# Patient Record
Sex: FEMALE | Race: WHITE | NOT HISPANIC OR LATINO | Employment: OTHER | URBAN - NONMETROPOLITAN AREA
[De-identification: names, ages, dates, MRNs, and addresses within clinical notes are randomized per-mention and may not be internally consistent; named-entity substitution may affect disease eponyms.]

---

## 2023-03-16 ENCOUNTER — TELEPHONE (OUTPATIENT)
Dept: PRIMARY CARE | Facility: CLINIC | Age: 73
End: 2023-03-16

## 2023-03-16 DIAGNOSIS — Z79.4 UNCONTROLLED TYPE 2 DIABETES MELLITUS WITH HYPERGLYCEMIA, WITH LONG-TERM CURRENT USE OF INSULIN (MULTI): Primary | ICD-10-CM

## 2023-03-16 DIAGNOSIS — E11.65 UNCONTROLLED TYPE 2 DIABETES MELLITUS WITH HYPERGLYCEMIA, WITH LONG-TERM CURRENT USE OF INSULIN (MULTI): Primary | ICD-10-CM

## 2023-03-16 RX ORDER — INSULIN DETEMIR 100 [IU]/ML
25 INJECTION, SOLUTION SUBCUTANEOUS 2 TIMES DAILY
Qty: 45 ML | Refills: 3 | Status: SHIPPED | OUTPATIENT
Start: 2023-03-16 | End: 2023-03-17 | Stop reason: ALTCHOICE

## 2023-03-16 RX ORDER — INSULIN DETEMIR 100 [IU]/ML
25 INJECTION, SOLUTION SUBCUTANEOUS 2 TIMES DAILY
COMMUNITY
End: 2023-03-16 | Stop reason: SDUPTHER

## 2023-03-16 NOTE — TELEPHONE ENCOUNTER
The pharmacy is calling to get the approval to switch the Levemir to the Flex Pen, the FlexTouch is no longer.  Please call [facility] with verbal.  When calling ask for the store, they now have an answering service screening calls.

## 2023-03-17 DIAGNOSIS — E11.21 TYPE 2 DIABETES MELLITUS WITH DIABETIC NEPHROPATHY, WITH LONG-TERM CURRENT USE OF INSULIN (MULTI): Primary | ICD-10-CM

## 2023-03-17 DIAGNOSIS — Z79.4 TYPE 2 DIABETES MELLITUS WITH DIABETIC NEPHROPATHY, WITH LONG-TERM CURRENT USE OF INSULIN (MULTI): Primary | ICD-10-CM

## 2023-03-17 RX ORDER — INSULIN DETEMIR 100 [IU]/ML
25 INJECTION, SOLUTION SUBCUTANEOUS 2 TIMES DAILY
Qty: 45 ML | Refills: 3
Start: 2023-03-17 | End: 2023-04-25 | Stop reason: SDUPTHER

## 2023-03-17 NOTE — PROGRESS NOTES
The pharmacy is calling to get the approval to switch the Levemir to the Flex Pen, the FlexTouch is no longer.  Please call [facility] with verbal.  When calling ask for the store, they now have an answering service screening calls.       Robin called pharm, verbal given for flex pen     Rx changed to flex pen in chart

## 2023-04-01 DIAGNOSIS — E11.21 TYPE 2 DIABETES MELLITUS WITH DIABETIC NEPHROPATHY, WITH LONG-TERM CURRENT USE OF INSULIN (MULTI): Primary | ICD-10-CM

## 2023-04-01 DIAGNOSIS — Z79.4 TYPE 2 DIABETES MELLITUS WITH DIABETIC NEPHROPATHY, WITH LONG-TERM CURRENT USE OF INSULIN (MULTI): Primary | ICD-10-CM

## 2023-04-01 RX ORDER — INSULIN LISPRO 200 [IU]/ML
INJECTION, SOLUTION SUBCUTANEOUS
Qty: 3 ML | Refills: 1 | Status: SHIPPED | OUTPATIENT
Start: 2023-04-01 | End: 2023-04-25 | Stop reason: SDUPTHER

## 2023-04-01 RX ORDER — INSULIN LISPRO 200 [IU]/ML
INJECTION, SOLUTION SUBCUTANEOUS
COMMUNITY
End: 2023-04-01 | Stop reason: SDUPTHER

## 2023-04-11 DIAGNOSIS — Z79.4 TYPE 2 DIABETES MELLITUS WITHOUT COMPLICATION, WITH LONG-TERM CURRENT USE OF INSULIN (MULTI): Primary | ICD-10-CM

## 2023-04-11 DIAGNOSIS — E11.9 TYPE 2 DIABETES MELLITUS WITHOUT COMPLICATION, WITH LONG-TERM CURRENT USE OF INSULIN (MULTI): Primary | ICD-10-CM

## 2023-04-13 DIAGNOSIS — J45.909 ASTHMA, UNSPECIFIED ASTHMA SEVERITY, UNSPECIFIED WHETHER COMPLICATED, UNSPECIFIED WHETHER PERSISTENT (HHS-HCC): Primary | ICD-10-CM

## 2023-04-13 RX ORDER — SALMETEROL XINAFOATE 50 UG/1
1 POWDER, METERED ORAL; RESPIRATORY (INHALATION) EVERY 12 HOURS
COMMUNITY
End: 2023-04-13 | Stop reason: SDUPTHER

## 2023-04-13 RX ORDER — SALMETEROL XINAFOATE 50 UG/1
1 POWDER, METERED ORAL; RESPIRATORY (INHALATION) EVERY 12 HOURS
Qty: 1 EACH | Refills: 1 | Status: SHIPPED | OUTPATIENT
Start: 2023-04-13 | End: 2023-06-07 | Stop reason: SDUPTHER

## 2023-04-18 ENCOUNTER — TELEMEDICINE (OUTPATIENT)
Dept: PHARMACY | Facility: HOSPITAL | Age: 73
End: 2023-04-18
Payer: MEDICARE

## 2023-04-18 DIAGNOSIS — Z79.4 TYPE 2 DIABETES MELLITUS WITHOUT COMPLICATION, WITH LONG-TERM CURRENT USE OF INSULIN (MULTI): ICD-10-CM

## 2023-04-18 DIAGNOSIS — E11.9 TYPE 2 DIABETES MELLITUS WITHOUT COMPLICATION, WITH LONG-TERM CURRENT USE OF INSULIN (MULTI): ICD-10-CM

## 2023-04-18 DIAGNOSIS — E11.9 TYPE 2 DIABETES MELLITUS WITHOUT COMPLICATION, WITH LONG-TERM CURRENT USE OF INSULIN (MULTI): Primary | ICD-10-CM

## 2023-04-18 DIAGNOSIS — Z79.4 TYPE 2 DIABETES MELLITUS WITHOUT COMPLICATION, WITH LONG-TERM CURRENT USE OF INSULIN (MULTI): Primary | ICD-10-CM

## 2023-04-18 RX ORDER — BLOOD-GLUCOSE,RECEIVER,CONT
EACH MISCELLANEOUS
Qty: 1 EACH | Refills: 0 | Status: SHIPPED | OUTPATIENT
Start: 2023-04-18 | End: 2023-06-19

## 2023-04-18 RX ORDER — MONTELUKAST SODIUM 10 MG/1
10 TABLET ORAL NIGHTLY
COMMUNITY
End: 2023-10-10 | Stop reason: SDUPTHER

## 2023-04-18 RX ORDER — BLOOD-GLUCOSE SENSOR
EACH MISCELLANEOUS
Qty: 3 EACH | Refills: 1 | Status: SHIPPED | OUTPATIENT
Start: 2023-04-18 | End: 2023-06-19 | Stop reason: WASHOUT

## 2023-04-18 RX ORDER — LEVOTHYROXINE SODIUM 112 UG/1
CAPSULE ORAL
COMMUNITY
End: 2023-11-03 | Stop reason: SDUPTHER

## 2023-04-18 RX ORDER — LOSARTAN POTASSIUM 25 MG/1
25 TABLET ORAL DAILY
COMMUNITY
End: 2023-10-10 | Stop reason: SDUPTHER

## 2023-04-18 ASSESSMENT — ENCOUNTER SYMPTOMS
TREMORS: 1
BACK PAIN: 1
DIZZINESS: 1

## 2023-04-18 NOTE — PATIENT INSTRUCTIONS
Thank you for meeting with me today. Please reach out with any questions or concerns before our appointment next week.

## 2023-04-18 NOTE — PROGRESS NOTES
Pharmacy Post-Discharge Visit  Shanika Knowles is a 72 y.o. female was referred to the [facility] pharmacy team   for their Diabetes (Type II, uncontrolled).      Referring Provider: Sangita Telles,     Subjective   Allergies   Allergen Reactions    Penicillin V Anaphylaxis    Ciprofloxacin Other     GI upset       [id number] - [place], [place] - [address] [facility]  [address]  [facility] [address]  Phone: [phone] Fax: [phone]      Social History     Social History Narrative    Not on file          Diabetes  She presents for her initial diabetic visit. She has type 2 diabetes mellitus. Her disease course has been worsening. Hypoglycemia symptoms include dizziness and tremors. Current diabetic treatment includes insulin injections. She is compliant with treatment all of the time. She is following a generally healthy diet. She participates in exercise intermittently. Her home blood glucose trend is fluctuating dramatically. An ACE inhibitor/angiotensin II receptor blocker is being taken.     Patient tests her blood glucose once daily in the morning and any time that she feels her blood sugar going low. She has had a few instances where it will get to the 50's and she gets dizzy and shaky.     -Discussed her blood glucose trends. Patient notices that about 6 hours after she eats dinner, her sugars will normalize (120's) but when she wakes up in the morning, they are higher (210's) despite not eating.     -We discussed diet and lifestyle. Patient was undergoing a lot of family stressors that led her to eating less healthy. She has since gotten back on track and noticed her sugars beginning to improve    -Patient's exercise regimen includes stretching and light yoga due to chronic back pain. We discussed how if this is the exercise she can tolerate, then she should continue with it as tolerated.     Review of Systems   Musculoskeletal:  Positive for back pain.    Neurological:  Positive for dizziness and tremors.   All other systems reviewed and are negative.      Objective     There were no vitals taken for this visit.     LAB  Lab Results   Component Value Date    BILITOT 1.3 (H) 05/02/2022    CALCIUM 10.2 06/27/2022    CO2 25 06/27/2022     06/27/2022    CREATININE 1.07 (H) 06/27/2022    GLUCOSE 174 (H) 06/27/2022    ALKPHOS 75 05/02/2022    K 4.1 06/27/2022    PROT 6.8 05/02/2022     06/27/2022    AST 25 05/02/2022    ALT 28 05/02/2022    BUN 25 (H) 06/27/2022    ANIONGAP 14 06/27/2022    ALBUMIN 4.2 05/02/2022    GFRF 55 (A) 06/27/2022     Lab Results   Component Value Date    TRIG 158 (H) 04/19/2021    CHOL 194 04/19/2021    HDL 52.2 04/19/2021     Lab Results   Component Value Date    HGBA1C 8.2 04/19/2021   IO A1c : 9.9% (3/1/2023)      Current Outpatient Medications on File Prior to Visit   Medication Sig Dispense Refill    HumaLOG KwikPen Insulin 200 unit/mL (3 mL) insulin pen pen ADMINISTER 18 UNITS UNDER THE SKIN THREE TIMES DAILY 3 mL 1    insulin detemir (Levemir FlexPen) 100 unit/mL (3 mL) pen Inject 25 Units under the skin in the morning and 25 Units before bedtime. 45 mL 3    levothyroxine (Tirosint) 112 mcg capsule Take by mouth once daily in the morning. Take before meals.      losartan (Cozaar) 25 mg tablet Take 1 tablet (25 mg) by mouth once daily.      montelukast (Singulair) 10 mg tablet Take 1 tablet (10 mg) by mouth once daily at bedtime.      salmeterol (Serevent Diskus) 50 mcg/dose diskus inhaler Inhale 1 puff  in the morning and 1 puff in the evening. 1 each 1    [DISCONTINUED] Serevent Diskus 50 mcg/dose diskus inhaler Inhale 1 puff  in the morning and 1 puff in the evening.       No current facility-administered medications on file prior to visit.        DRUG INTERATIONS  - none noted    Assessment/Plan   Problem List Items Addressed This Visit    None  Visit Diagnoses       Type 2 diabetes mellitus without complication, with  long-term current use of insulin (CMS/[facility])              START Dexcom G7 Sensors and Readers for continuous glucose monitoring.  Prescriptions sent to [facility]  CONTINUE all other medications as prescribed.   MONITOR blood glucose levels for 1 week. We will assess insulin dosing at next visit  FOLLOW UP with clinical pharmacy on 4/25 at 11 AM to discuss use of Dexcom, assess blood glucose log and determine need for insulin adjustments.     Marianna Villalta PharmD     Verbal consent to manage patient's drug therapy was obtained from the patient . They were informed they may decline to participate or withdraw from participation in pharmacy services at any time.

## 2023-04-19 NOTE — PROGRESS NOTES
I reviewed the progress note and agree with the resident’s findings and plans as written. Case discussed with resident.    Thanks,  Julisa Aceves, PharmD

## 2023-04-25 ENCOUNTER — TELEMEDICINE (OUTPATIENT)
Dept: PHARMACY | Facility: HOSPITAL | Age: 73
End: 2023-04-25
Payer: MEDICARE

## 2023-04-25 DIAGNOSIS — E11.9 TYPE 2 DIABETES MELLITUS WITHOUT COMPLICATION, WITH LONG-TERM CURRENT USE OF INSULIN (MULTI): ICD-10-CM

## 2023-04-25 DIAGNOSIS — Z79.4 TYPE 2 DIABETES MELLITUS WITHOUT COMPLICATION, WITH LONG-TERM CURRENT USE OF INSULIN (MULTI): ICD-10-CM

## 2023-04-25 DIAGNOSIS — E11.21 TYPE 2 DIABETES MELLITUS WITH DIABETIC NEPHROPATHY, WITH LONG-TERM CURRENT USE OF INSULIN (MULTI): ICD-10-CM

## 2023-04-25 DIAGNOSIS — Z79.4 TYPE 2 DIABETES MELLITUS WITH DIABETIC NEPHROPATHY, WITH LONG-TERM CURRENT USE OF INSULIN (MULTI): ICD-10-CM

## 2023-04-25 RX ORDER — INSULIN DETEMIR 100 [IU]/ML
INJECTION, SOLUTION SUBCUTANEOUS
Qty: 45 ML | Refills: 3 | Status: SHIPPED | OUTPATIENT
Start: 2023-04-25 | End: 2023-11-09 | Stop reason: SDUPTHER

## 2023-04-25 RX ORDER — LANCETS 33 GAUGE
EACH MISCELLANEOUS
Qty: 300 EACH | Refills: 3 | Status: SHIPPED | OUTPATIENT
Start: 2023-04-25 | End: 2024-03-12 | Stop reason: SDUPTHER

## 2023-04-25 RX ORDER — BLOOD SUGAR DIAGNOSTIC
STRIP MISCELLANEOUS
Qty: 300 STRIP | Refills: 3 | Status: SHIPPED | OUTPATIENT
Start: 2023-04-25 | End: 2023-11-09 | Stop reason: SDUPTHER

## 2023-04-25 RX ORDER — INSULIN LISPRO 200 [IU]/ML
INJECTION, SOLUTION SUBCUTANEOUS
Qty: 3 ML | Refills: 1 | Status: SHIPPED | OUTPATIENT
Start: 2023-04-25 | End: 2023-05-24 | Stop reason: SDUPTHER

## 2023-04-25 NOTE — PROGRESS NOTES
"Pharmacist Clinic: Diabetes Management  Shanika Knowles is a 72 y.o. female who presents for a follow-up evaluation of her Type 2 diabetes mellitus. At last visit, we discussed her blood glucose levels and potential for insulin titration.     Referring Provider: Sangita Telles, DO     LAB REVIEW   Glucose (mg/dL)   Date Value   06/27/2022 174 (H)   05/02/2022 194 (H)   04/19/2021 180 (H)     Hemoglobin A1C (%)   Date Value   04/19/2021 8.2   12/30/2020 8.2   12/02/2019 8.1     Bicarbonate (mmol/L)   Date Value   06/27/2022 25   05/02/2022 24   04/19/2021 24     Urea Nitrogen (mg/dL)   Date Value   06/27/2022 25 (H)   05/02/2022 15   04/19/2021 13     Creatinine (mg/dL)   Date Value   06/27/2022 1.07 (H)   05/02/2022 0.87   04/19/2021 0.88     Lab Results   Component Value Date    CREATININE 1.07 (H) 06/27/2022     Lab Results   Component Value Date    CHOL 194 04/19/2021    CHOL 219 (H) 12/30/2020    CHOL 204 (H) 12/02/2019     Lab Results   Component Value Date    HDL 52.2 04/19/2021    HDL 57.5 12/30/2020    HDL 52.4 12/02/2019     No results found for: LDLCALC  Lab Results   Component Value Date    TRIG 158 (H) 04/19/2021    TRIG 133 12/30/2020    TRIG 150 (H) 12/02/2019     No components found for: CHOLHDL  No results found for: LDLCALC    DIABETES ASSESSMENT    CURRENT PHARMACOTHERAPY  - Humalog 200 units/mL: 18 units TID with meals  -Levemir 100 units/mL: 26 units BID    SECONDARY PREVENTION  - Statin? No  - ACE-I/ARB? Yes  - Aspirin? No  - Last eye exam:  - Last diabetic foot exam:      Patient does not report symptoms of hypoglycemia.   Patient does not report symptoms of hyperglycemia.     -Would like to increase how often she tests as she thinks it will help us determine our next steps in treatment. I agree and we have decided to increase testing to TID while waiting for Dexcom PA to be approved.   -Uses [employer] to help bring her sugars up if needed.   -Notices that her hands are \"puffy\" and painful " "when she increases her dose much higher. Describes it as a \"rug burn\" feeling.   -Had increased her Levemir to 26 units BID on her own but is nervous to go higher as her pain gets significantly worse at the higher doses.   -Patient has a great attitude. The high levels in the morning really frustrates her though.     RECOMMENDATIONS/PLAN  1. Patients diabetes is poorly controlled with most recent A1c of 9.9% (goal < 8 %).   - Continue: Humalog 200 units/mL: 18 units TID with meals (refills needed)  - Initiate:   Levemir 100 units/mL: 25 units every morning and 27 units at bedtime  One Touch Ultra test strips: use to test blood glucose three times daily   One Touch Ultra lancets: use to test blood glucose three times daily   - Discontinue: Levemir 100 units/mL 26 units BID     2. Education:   -Patient and I discussed if she could have delayed gastric emptying that causes her sugars to dramatically spike overnight. I discussed how this is a possibility and how eating smaller, more frequent meals can help decrease that spike.       Clinical Pharmacist follow up: 5/2 @ 11 AM  -Discuss blood glucose after insulin adjustments      Thank you,  Marianna Villalta, PharmD  PGY-1 Pharmacy Resident     Verbal consent to manage patient's drug therapy was obtained from the patient. They were informed they may decline to participate or withdraw from participation in pharmacy services at any time.    "

## 2023-04-25 NOTE — PATIENT INSTRUCTIONS
It was a pleasure to meet with you today. We discussed adjusting the Levemir to 25 units subcutaneous in the morning and 27 units at bedtime to see if this helps get better control of the morning spike in glucose. Please increase testing to three times daily. I look forward to talking to you again on 5/2 @ 11AM ! Please reach out with any questions or concerns.

## 2023-05-02 ENCOUNTER — TELEMEDICINE (OUTPATIENT)
Dept: PHARMACY | Facility: HOSPITAL | Age: 73
End: 2023-05-02
Payer: MEDICARE

## 2023-05-02 DIAGNOSIS — E11.21 TYPE 2 DIABETES MELLITUS WITH DIABETIC NEPHROPATHY, WITH LONG-TERM CURRENT USE OF INSULIN (MULTI): ICD-10-CM

## 2023-05-02 DIAGNOSIS — Z79.4 TYPE 2 DIABETES MELLITUS WITH DIABETIC NEPHROPATHY, WITH LONG-TERM CURRENT USE OF INSULIN (MULTI): ICD-10-CM

## 2023-05-02 NOTE — PATIENT INSTRUCTIONS
Thank you for meeting with me today. Please continue to use your Levemir as 25 units in the morning and 27 units in the evening. Remember to also use your Humalog before meals. We will discuss next week (5/9/2023) @ 11 AM your blood glucose readings from this past week. Please reach out with any questions or concerns.

## 2023-05-02 NOTE — PROGRESS NOTES
"Pharmacist Clinic: Diabetes Management  Shanika Knowles is a 72 y.o. female who presents for a follow-up evaluation of her Type 2 diabetes mellitus. At last visit, we increased Levemir to 25 units QAM and 27 units QPM.     Referring Provider: Sangita Telles, DO     LAB REVIEW   Glucose (mg/dL)   Date Value   06/27/2022 174 (H)   05/02/2022 194 (H)   04/19/2021 180 (H)     Hemoglobin A1C (%)   Date Value   04/19/2021 8.2   12/30/2020 8.2   12/02/2019 8.1     Bicarbonate (mmol/L)   Date Value   06/27/2022 25   05/02/2022 24   04/19/2021 24     Urea Nitrogen (mg/dL)   Date Value   06/27/2022 25 (H)   05/02/2022 15   04/19/2021 13     Creatinine (mg/dL)   Date Value   06/27/2022 1.07 (H)   05/02/2022 0.87   04/19/2021 0.88     Lab Results   Component Value Date    CREATININE 1.07 (H) 06/27/2022     Lab Results   Component Value Date    CHOL 194 04/19/2021    CHOL 219 (H) 12/30/2020    CHOL 204 (H) 12/02/2019     Lab Results   Component Value Date    HDL 52.2 04/19/2021    HDL 57.5 12/30/2020    HDL 52.4 12/02/2019     No results found for: LDLCALC  Lab Results   Component Value Date    TRIG 158 (H) 04/19/2021    TRIG 133 12/30/2020    TRIG 150 (H) 12/02/2019     No components found for: CHOLHDL  No results found for: LDLCALC    DIABETES ASSESSMENT    CURRENT PHARMACOTHERAPY  - [medication, strength, directions, titration history, and initiation date]    SECONDARY PREVENTION  - Statin? No  - ACE-I/ARB? Yes  - Aspirin? No      SMBG MEASUREMENTS  - Has been eating a little less healthy this week but would like to get back on track next week and would like to talk again then.     Patient does not report symptoms of hypoglycemia.   Patient does not report symptoms of hyperglycemia.     -Patient is still having significant pain in her hands, especially since increasing the insulin dose further last week. She describes them as \"balloon-like\" today during the visit.     RECOMMENDATIONS/PLAN  1. Patients diabetes is poorly " controlled with most recent A1c of 9.9% (goal < 7 %). This was an IO A1c on 3/1/2023  - Continue: all medications as prescribed. No refills needed at this time.     2. Education:   -Should I take Humalog before or after meals? Should be taken before meals for best control of glucose        Clinical Pharmacist follow up: 5/9/2023 11 AM    Thank you,  Marianna Villalta, PharmD  PGY-1 Pharmacy Resident    Verbal consent to manage patient's drug therapy was obtained from the patient . They were informed they may decline to participate or withdraw from participation in pharmacy services at any time.

## 2023-05-09 ENCOUNTER — TELEMEDICINE (OUTPATIENT)
Dept: PHARMACY | Facility: HOSPITAL | Age: 73
End: 2023-05-09
Payer: MEDICARE

## 2023-05-09 DIAGNOSIS — Z79.4 TYPE 2 DIABETES MELLITUS WITH DIABETIC NEPHROPATHY, WITH LONG-TERM CURRENT USE OF INSULIN (MULTI): ICD-10-CM

## 2023-05-09 DIAGNOSIS — E11.21 TYPE 2 DIABETES MELLITUS WITH DIABETIC NEPHROPATHY, WITH LONG-TERM CURRENT USE OF INSULIN (MULTI): ICD-10-CM

## 2023-05-09 NOTE — PROGRESS NOTES
Pharmacist Clinic: Diabetes Management  Shanika Knowles is a 72 y.o. female who presents for a follow-up evaluation of her Type 2 diabetes mellitus. At last visit, we discussed her increased insulin dose. Patient wanted to give it one for week on this dose before making further changes.     Referring Provider: Sangita Telles, DO     LAB REVIEW   Glucose (mg/dL)   Date Value   06/27/2022 174 (H)   05/02/2022 194 (H)   04/19/2021 180 (H)     Hemoglobin A1C (%)   Date Value   04/19/2021 8.2   12/30/2020 8.2   12/02/2019 8.1     Bicarbonate (mmol/L)   Date Value   06/27/2022 25   05/02/2022 24   04/19/2021 24     Urea Nitrogen (mg/dL)   Date Value   06/27/2022 25 (H)   05/02/2022 15   04/19/2021 13     Creatinine (mg/dL)   Date Value   06/27/2022 1.07 (H)   05/02/2022 0.87   04/19/2021 0.88     Lab Results   Component Value Date    CREATININE 1.07 (H) 06/27/2022     Lab Results   Component Value Date    CHOL 194 04/19/2021    CHOL 219 (H) 12/30/2020    CHOL 204 (H) 12/02/2019     Lab Results   Component Value Date    HDL 52.2 04/19/2021    HDL 57.5 12/30/2020    HDL 52.4 12/02/2019     No results found for: LDLCALC  Lab Results   Component Value Date    TRIG 158 (H) 04/19/2021    TRIG 133 12/30/2020    TRIG 150 (H) 12/02/2019     No components found for: CHOLHDL  No results found for: LDLCALC    DIABETES ASSESSMENT    CURRENT PHARMACOTHERAPY  - Humalog 200 units/mL: 18 units TID with meals  -Levemir 100 units/mL: 25 units QAM and 27 units QPM     SECONDARY PREVENTION  - Statin? No  - ACE-I/ARB? Yes  - Aspirin? No    HISTORICAL PHARMACOTHERAPY  - Ozempic- pain in joints and muscles. Saw good results in blood glucose but not worth the discomfort.   - Jardiance- decreased kidney function on this medication (potential VICENTE)    SMBG MEASUREMENTS  -5/7  285 (7pm) --> took 18 units   110 (11pm)    -5/8  234 (8 AM, fasting)  207 (7pm) --18 unit    -5/9  137  (4AM)  197 (8AM, fasting)    Patient does not report symptoms of  hypoglycemia.   Patient does report symptoms of hyperglycemia.       RECOMMENDATIONS/PLAN  1. Patients diabetes is poorly controlled with most recent A1c of 9.9% (goal < 8 %).   - Continue: all medications as prescribed  -Patient would most likely benefit from increasing her nightly Levemir, but she is unable to tolerate a higher dose at this time.   -Patient working on diet and lifestyle adjustments and has seen some changes in her sugars.     2. Education:   -Split her evening meal and then split her evening insulin. Patient has been doing this already without any problems.     -Starting with a small goal of keeping those high morning sugars below 200 in the morning. She is disappointed in her numbers but I reminded her that a little change is better than no change.     -Inquired if she was interested in seeing endocrinology. She is not interested at this time.     -Patient declines re-starting any GLP-1 or SGLT-2 due to their side effects.       Clinical Pharmacist follow up: 6/6/2023 @11AM    Thank you,  Marianna Villalta, PharmD  PGY-1 Pharmacy Resident    Verbal consent to manage patient's drug therapy was obtained from the patient. They were informed they may decline to participate or withdraw from participation in pharmacy services at any time.

## 2023-05-24 DIAGNOSIS — Z79.4 TYPE 2 DIABETES MELLITUS WITH DIABETIC NEPHROPATHY, WITH LONG-TERM CURRENT USE OF INSULIN (MULTI): ICD-10-CM

## 2023-05-24 DIAGNOSIS — E11.21 TYPE 2 DIABETES MELLITUS WITH DIABETIC NEPHROPATHY, WITH LONG-TERM CURRENT USE OF INSULIN (MULTI): ICD-10-CM

## 2023-05-25 RX ORDER — INSULIN LISPRO 200 [IU]/ML
INJECTION, SOLUTION SUBCUTANEOUS
Qty: 9 ML | Refills: 3 | Status: SHIPPED | OUTPATIENT
Start: 2023-05-25 | End: 2023-09-14 | Stop reason: SDUPTHER

## 2023-06-06 ENCOUNTER — TELEMEDICINE (OUTPATIENT)
Dept: PHARMACY | Facility: HOSPITAL | Age: 73
End: 2023-06-06
Payer: MEDICARE

## 2023-06-06 DIAGNOSIS — E11.21 TYPE 2 DIABETES MELLITUS WITH DIABETIC NEPHROPATHY, WITH LONG-TERM CURRENT USE OF INSULIN (MULTI): ICD-10-CM

## 2023-06-06 DIAGNOSIS — Z79.4 TYPE 2 DIABETES MELLITUS WITH DIABETIC NEPHROPATHY, WITH LONG-TERM CURRENT USE OF INSULIN (MULTI): ICD-10-CM

## 2023-06-06 NOTE — PROGRESS NOTES
Pharmacist Clinic: Diabetes Management  Shanika Knowles is a 72 y.o. female who presents for a follow-up evaluation of her Type 2 diabetes mellitus. At last visit, she split insulin into night and morning doses.    Referring Provider: Sangita Telles,      LAB REVIEW   Glucose (mg/dL)   Date Value   06/27/2022 174 (H)   05/02/2022 194 (H)   04/19/2021 180 (H)     Hemoglobin A1C (%)   Date Value   04/19/2021 8.2   12/30/2020 8.2   12/02/2019 8.1     Bicarbonate (mmol/L)   Date Value   06/27/2022 25   05/02/2022 24   04/19/2021 24     Urea Nitrogen (mg/dL)   Date Value   06/27/2022 25 (H)   05/02/2022 15   04/19/2021 13     Creatinine (mg/dL)   Date Value   06/27/2022 1.07 (H)   05/02/2022 0.87   04/19/2021 0.88     Lab Results   Component Value Date    CREATININE 1.07 (H) 06/27/2022     Lab Results   Component Value Date    CHOL 194 04/19/2021    CHOL 219 (H) 12/30/2020    CHOL 204 (H) 12/02/2019     Lab Results   Component Value Date    HDL 52.2 04/19/2021    HDL 57.5 12/30/2020    HDL 52.4 12/02/2019     No results found for: LDLCALC  Lab Results   Component Value Date    TRIG 158 (H) 04/19/2021    TRIG 133 12/30/2020    TRIG 150 (H) 12/02/2019     No components found for: CHOLHDL  No results found for: LDLCALC    DIABETES ASSESSMENT    CURRENT PHARMACOTHERAPY  - Humalog 200u/mL 18u tid w meals  - Levemir 100u/mL 25u qam and 27u qpm    SECONDARY PREVENTION  - Statin? No  - ACE-I/ARB? Yes  - Aspirin? No  - Last eye exam:  - Last diabetic foot exam:    HISTORICAL PHARMACOTHERAPY  - Ozempic caused joint and muscle pain  - Jardiance caused decrease in kidney function    SMBG MEASUREMENTS    Morning Readings (Fasting):  - 6/6: 164 mg/dL  - 6/5: 164 mg/dL  - 6/4: 172 mg/dL  - 6/3: 182 mg/dL  - 6/2: 189 mg/dL  - 6/1: 164 mg/dL    Evening Readings (Around 8PM):  - 6/6: 78 mg/dL  - 5/25: 67 mg/dL  - 5/22: 130 mg/dL  - 5/20: 205 mg/dL      Patient does report symptoms of hypoglycemia.   - Patient has reported low in   60-70s and has made her feel bad. She has take a 4oz coke or 1 tablespoon of honey to increase her blood sugar when this happens. She states it happen in the evening and has only happened once in the past month.  Patient does not report symptoms of hyperglycemia.     RECOMMENDATIONS/PLAN  1. Patients diabetes is poorly controlled with most recent A1c of 9.9% (goal < 8 %).   - Continue all medications under the continuation of care with the referring provider and clinical pharmacy team  - The patient did not have many evening readings, but states she has gone low. I advised the patient to keep record and bring to next pcp appointment. If continue to be low, consider decreasing evening mealtime insulin.    2. Education/Discussion:   - At last visit, the patient was advised to eat smaller meals and more often throughout the day rather than large meals that could fluctuate blood glucose. She states that she has become very hungry when doing this and reverted back to eating 3 ~6-8 inch plate of food meals every day  - She states that prior to her most recent A1c she had dealt with a family death and had to clean out her relatives house and often ate out during this process at fast food and chinese restaurants. She believes her diet has much improved since then.  - I counseled on specific numbers for severe hypoglycemia, signs and symptoms, and how to treat/when to seek medical help  - I reviewed goals of treatment for new A1c of 8% and that it correlates to a 183mg/dL average    Clinical Pharmacist follow up: 7/11 @ 11AM to view new A1c, record new blood sugar readings, and assess any changes made at pcp appointment    Thank you,  Gerardo Hoang, PharmD  PGY1 Pharmacy Resident    Verbal consent to manage patient's drug therapy was obtained from the patient or an individual authorized to act on behalf of a patient. They were informed they may decline to participate or withdraw from participation in pharmacy services at any  time.

## 2023-06-07 ENCOUNTER — APPOINTMENT (OUTPATIENT)
Dept: PRIMARY CARE | Facility: CLINIC | Age: 73
End: 2023-06-07
Payer: MEDICARE

## 2023-06-07 DIAGNOSIS — J45.909 ASTHMA, UNSPECIFIED ASTHMA SEVERITY, UNSPECIFIED WHETHER COMPLICATED, UNSPECIFIED WHETHER PERSISTENT (HHS-HCC): ICD-10-CM

## 2023-06-07 NOTE — PROGRESS NOTES
I reviewed the progress note and agree with the resident’s findings and plans as written. Case discussed with resident. If A1c is not at goal, recommend endocrinology referral.     Thanks,  Julisa Aecves, PharmD

## 2023-06-08 RX ORDER — SALMETEROL XINAFOATE 50 UG/1
1 POWDER, METERED ORAL; RESPIRATORY (INHALATION) EVERY 12 HOURS
Qty: 1 EACH | Refills: 1 | Status: SHIPPED | OUTPATIENT
Start: 2023-06-08 | End: 2023-09-14 | Stop reason: SDUPTHER

## 2023-06-15 DIAGNOSIS — E11.65 UNCONTROLLED TYPE 2 DIABETES MELLITUS WITH HYPERGLYCEMIA (MULTI): Primary | ICD-10-CM

## 2023-06-15 LAB — THYROTROPIN (MIU/L) IN SER/PLAS BY DETECTION LIMIT <= 0.05 MIU/L: 0.69 MIU/L (ref 0.44–3.98)

## 2023-06-15 RX ORDER — PEN NEEDLE, DIABETIC 30 GX3/16"
1 NEEDLE, DISPOSABLE MISCELLANEOUS
Qty: 500 EACH | Refills: 1 | Status: SHIPPED | OUTPATIENT
Start: 2023-06-15 | End: 2023-11-09 | Stop reason: SDUPTHER

## 2023-06-15 RX ORDER — PEN NEEDLE, DIABETIC 30 GX3/16"
1 NEEDLE, DISPOSABLE MISCELLANEOUS
COMMUNITY
End: 2023-06-15 | Stop reason: SDUPTHER

## 2023-06-19 ENCOUNTER — OFFICE VISIT (OUTPATIENT)
Dept: PRIMARY CARE | Facility: CLINIC | Age: 73
End: 2023-06-19
Payer: MEDICARE

## 2023-06-19 VITALS
BODY MASS INDEX: 35.73 KG/M2 | OXYGEN SATURATION: 94 % | HEART RATE: 82 BPM | SYSTOLIC BLOOD PRESSURE: 134 MMHG | DIASTOLIC BLOOD PRESSURE: 78 MMHG | TEMPERATURE: 98.4 F | WEIGHT: 221.4 LBS | RESPIRATION RATE: 14 BRPM

## 2023-06-19 DIAGNOSIS — I10 ESSENTIAL HYPERTENSION: ICD-10-CM

## 2023-06-19 DIAGNOSIS — Z79.4 TYPE 2 DIABETES MELLITUS WITH OTHER SPECIFIED COMPLICATION, WITH LONG-TERM CURRENT USE OF INSULIN (MULTI): Primary | ICD-10-CM

## 2023-06-19 DIAGNOSIS — E66.9 CLASS 2 OBESITY WITH BODY MASS INDEX (BMI) OF 35.0 TO 35.9 IN ADULT, UNSPECIFIED OBESITY TYPE, UNSPECIFIED WHETHER SERIOUS COMORBIDITY PRESENT: ICD-10-CM

## 2023-06-19 DIAGNOSIS — E78.5 HYPERLIPIDEMIA, UNSPECIFIED HYPERLIPIDEMIA TYPE: ICD-10-CM

## 2023-06-19 DIAGNOSIS — E11.69 TYPE 2 DIABETES MELLITUS WITH OTHER SPECIFIED COMPLICATION, WITH LONG-TERM CURRENT USE OF INSULIN (MULTI): Primary | ICD-10-CM

## 2023-06-19 DIAGNOSIS — E03.9 ACQUIRED HYPOTHYROIDISM: ICD-10-CM

## 2023-06-19 PROBLEM — I25.10 CORONARY ARTERY DISEASE: Status: ACTIVE | Noted: 2023-06-19

## 2023-06-19 PROBLEM — Z87.448 HISTORY OF DECREASED RENAL FUNCTION: Status: ACTIVE | Noted: 2023-06-19

## 2023-06-19 PROBLEM — E11.9 TYPE 2 DIABETES MELLITUS, WITH LONG-TERM CURRENT USE OF INSULIN (MULTI): Status: ACTIVE | Noted: 2023-06-19

## 2023-06-19 PROBLEM — N18.30 CKD (CHRONIC KIDNEY DISEASE) STAGE 3, GFR 30-59 ML/MIN (MULTI): Status: ACTIVE | Noted: 2023-06-19

## 2023-06-19 PROBLEM — J45.909 ASTHMA (HHS-HCC): Status: ACTIVE | Noted: 2023-06-19

## 2023-06-19 PROBLEM — H90.3 BILATERAL SENSORINEURAL HEARING LOSS: Status: ACTIVE | Noted: 2023-06-19

## 2023-06-19 PROBLEM — M85.80 OSTEOPENIA: Status: ACTIVE | Noted: 2023-06-19

## 2023-06-19 PROBLEM — E66.812 CLASS 2 OBESITY WITH BODY MASS INDEX (BMI) OF 35.0 TO 35.9 IN ADULT: Status: ACTIVE | Noted: 2023-06-19

## 2023-06-19 LAB — POC HEMOGLOBIN A1C: 8.8 % (ref 4.2–6.5)

## 2023-06-19 PROCEDURE — 3066F NEPHROPATHY DOC TX: CPT | Performed by: FAMILY MEDICINE

## 2023-06-19 PROCEDURE — 3075F SYST BP GE 130 - 139MM HG: CPT | Performed by: FAMILY MEDICINE

## 2023-06-19 PROCEDURE — 83036 HEMOGLOBIN GLYCOSYLATED A1C: CPT | Performed by: FAMILY MEDICINE

## 2023-06-19 PROCEDURE — 3078F DIAST BP <80 MM HG: CPT | Performed by: FAMILY MEDICINE

## 2023-06-19 PROCEDURE — 1159F MED LIST DOCD IN RCRD: CPT | Performed by: FAMILY MEDICINE

## 2023-06-19 PROCEDURE — 1036F TOBACCO NON-USER: CPT | Performed by: FAMILY MEDICINE

## 2023-06-19 PROCEDURE — 3008F BODY MASS INDEX DOCD: CPT | Performed by: FAMILY MEDICINE

## 2023-06-19 PROCEDURE — 99214 OFFICE O/P EST MOD 30 MIN: CPT | Performed by: FAMILY MEDICINE

## 2023-06-19 PROCEDURE — 4010F ACE/ARB THERAPY RXD/TAKEN: CPT | Performed by: FAMILY MEDICINE

## 2023-06-19 PROCEDURE — 1160F RVW MEDS BY RX/DR IN RCRD: CPT | Performed by: FAMILY MEDICINE

## 2023-06-19 RX ORDER — NAPROXEN SODIUM 220 MG/1
TABLET, FILM COATED ORAL EVERY 24 HOURS
COMMUNITY

## 2023-06-19 NOTE — ASSESSMENT & PLAN NOTE
Lifestyle managed, does take baby aspirin every other day.  Patient previously disinclined to start statin therapy due to possible side effects  5/2021 CACS was 205.36.  1/2022 TC/HDL ratio 4.2 ()  Goal TC/HDL ratio 3.4 or less, LDL 99 or less,  or less, and TRIG 150 or less.  Repeat lipid panel ordered to be done prior to next routine follow-up.

## 2023-06-19 NOTE — PROGRESS NOTES
Subjective   Patient ID: Shanika Knowles is a 72 y.o. female who presents for Follow-up (F/up DM- pt has no concerns at this time ).    HPI     Last seen 3/2023, here for 3 month follow-up. Has been following with [facility] pharmacy for DM in the interim.    She is exercising and stretching daily.  She tries walking x 2 days  Limited secondary to hip pain, cannot bike with this.  She has been gardening.    Since beginning of June she has been eating out more.  Had a lot of family in town visiting.    CHRONIC CONDITIONS:  -Hypothyroidism  CURRENT DOSE: Synthroid 112 mcg daily.   6/2023 TSH was normal.     Medication is being taken as directed and is well-tolerated.   Patient denies heat or cold intolerance, diarrhea or constipation, coarsening of hair, and palpitations.      -HTN  Taking Losartan 25 mg daily.  Medications are being taken and tolerated well.   No side effects are reported.  Patient is taking blood pressure outside of office and reports good control.  Patient denies chest pain, shortness of breath with exertion, palpitations, lower extremity edema, headache, or dizziness.      -DM  Co-managed with [facility] pharmacy  Presently on Humalog + Levemir.  Unable to tolerate Jardiance due to side effects and decrease in kidney function.  Unable to tolerate Ozempic due to joint and muscle pain.    Pharmacy advised keep glucose below 200 in AM  She has had 2-3 readings below 60/70 x 2-3 times per month.  She cannot attribute these episode to missed meals.  Pharmacy suspects component of delayed gastric emptying.  She denies any overeating.    6/2023 Hgb A1c 8.8, prior A1c 9.9 in 3/2023 and 7.9 in 9/2022, 8.7 in 5/2022, 8.1 in 1/2021, 8.6 in 9/2021 5/2022 urine albumin negative.  9/2022 food check completed.     The patient denies polyuria, polydipsia, or polyphagia.   The patient denies numbness and tingling in their hands or feet.     -HLD/CAD  Lifestyle managed, does take baby aspirin every other day.  Patient previously  disinclined to start statin therapy due to possible side effects  5/2021 CACS was 205.36.  1/2022 TC/HDL ratio 4.2 ()     Patient denies any facial droop, sudden vision loss, weakness or numbness on one side of body.   Patient denies chest pain, shortness of breath with exertion, palpitations, or symptoms of claudication.      -History of stage III CKD, baseline of GFR 55-57.  She is s/p nephrology evaluation with Dr. Ferrari, was told everything is normal.  GFR 65 at that time, was told does not have CKD III  Told to come back as needed.     6/2022 GFR 55 + creatinine 1.07  5/2022 GFR 70 + creatinine 0.87.     -History of vitamin D deficiency.  4/2021 vitamin D level 24 (insufficient but not quite considered deficient).         Review of Systems   All other systems reviewed and are negative.      Objective   /78 (BP Location: Left arm, Patient Position: Sitting, BP Cuff Size: Large adult)   Pulse 82   Temp 36.9 °C (98.4 °F) (Temporal)   Resp 14   Wt 100 kg (221 lb 6.4 oz)   SpO2 94%   BMI 35.73 kg/m²     Physical Exam  Vitals and nursing note reviewed.   Constitutional:       General: She is not in acute distress.     Appearance: Normal appearance. She is not toxic-appearing.   HENT:      Head: Normocephalic and atraumatic.   Neck:      Thyroid: No thyromegaly.   Cardiovascular:      Rate and Rhythm: Normal rate and regular rhythm.      Heart sounds: No murmur heard.     No friction rub. No gallop.   Pulmonary:      Effort: Pulmonary effort is normal.      Breath sounds: Normal breath sounds. No wheezing, rhonchi or rales.   Musculoskeletal:      Right lower leg: No edema.      Left lower leg: No edema.   Lymphadenopathy:      Cervical: No cervical adenopathy.   Skin:     General: Skin is warm and dry.   Neurological:      General: No focal deficit present.      Mental Status: She is alert and oriented to person, place, and time.   Psychiatric:         Mood and Affect: Mood normal.          Behavior: Behavior normal.         Assessment/Plan   Problem List Items Addressed This Visit       Acquired hypothyroidism     CURRENT DOSE: Synthroid 112 mcg daily.   6/2023 TSH was normal, to be checked annually.         Essential hypertension     BP is 134/78 in office today, mildly elevated  Goal BP is 130/80 or less, ideally 120/80 or less.   Patient reports pressures in 120s outside office.  Continue Losartan 25 mg daily.  Continue with home monitoring.  Diet and exercise recommendations revisited.         Relevant Orders    Follow Up In [facility] - PCP    Comprehensive Metabolic Panel    CBC    Lipid Panel    Hyperlipidemia     Lifestyle managed, does take baby aspirin every other day.  Patient previously disinclined to start statin therapy due to possible side effects  5/2021 CACS was 205.36.  1/2022 TC/HDL ratio 4.2 ()  Goal TC/HDL ratio 3.4 or less, LDL 99 or less,  or less, and TRIG 150 or less.  Repeat lipid panel ordered to be done prior to next routine follow-up.         Relevant Orders    Follow Up In [facility] - PCP    Lipid Panel    Class 2 obesity with body mass index (BMI) of 35.0 to 35.9 in adult    Type 2 diabetes mellitus, with long-term current use of insulin (CMS/[facility]) - Primary     6/2023 Hgb A1c 8.8, prior A1c 9.9 in 3/2023 and 7.9 in 9/2022.  5/2022 urine albumin negative, due annually, ordered today.  9/2022 food check completed, to be done annually.    Repeat A1c and urine albumin ordered to be done prior to 3 month follow-up.    Goal A1c is 7.0 or less  A1c is trending in the right direction  Diet and exercise recommendations revisited.    Continue present regimen:  - Humalog 18u tid w meals  - Levemir 25u qam and 27u qpm  - Unable to tolerate both Jardiance and Ozempic in the past.    Currently co-managed with [facility] pharmacy, patient to continue to follow-up with them as schedule regarding insulin titration. We briefly discussed doing trial with GLP-1  injectables other than Ozempic, may further discuss options available with pharmacy. Patient disinclined to pursue if cost prohibitive.         Relevant Orders    Follow Up In [facility] - PCP    Hemoglobin A1C    Albumin , Urine Random    Comprehensive Metabolic Panel    CBC       Follow-up in 3 months for recheck DM.  Due to diabetic foot exam upon rooming.  Labs to be done prior.   Call for sooner follow-up if needed.          Scribe Attestation  By signing my name below, IAvril Scribe   attest that this documentation has been prepared under the direction and in the presence of Sangita Telles DO.

## 2023-06-19 NOTE — ASSESSMENT & PLAN NOTE
6/2023 Hgb A1c 8.8, prior A1c 9.9 in 3/2023 and 7.9 in 9/2022.  5/2022 urine albumin negative, due annually, ordered today.  9/2022 food check completed, to be done annually.    Repeat A1c and urine albumin ordered to be done prior to 3 month follow-up.    Goal A1c is 7.0 or less  A1c is trending in the right direction  Diet and exercise recommendations revisited.    Continue present regimen:  - Humalog 18u tid w meals  - Levemir 25u qam and 27u qpm  - Unable to tolerate both Jardiance and Ozempic in the past.    Currently co-managed with [facility] pharmacy, patient to continue to follow-up with them as schedule regarding insulin titration. We briefly discussed doing trial with GLP-1 injectables other than Ozempic, may further discuss options available with pharmacy. Patient disinclined to pursue if cost prohibitive.

## 2023-06-19 NOTE — ASSESSMENT & PLAN NOTE
BP is 134/78 in office today, mildly elevated  Goal BP is 130/80 or less, ideally 120/80 or less.   Patient reports pressures in 120s outside office.  Continue Losartan 25 mg daily.  Continue with home monitoring.  Diet and exercise recommendations revisited.

## 2023-09-14 DIAGNOSIS — Z79.4 TYPE 2 DIABETES MELLITUS WITH DIABETIC NEPHROPATHY, WITH LONG-TERM CURRENT USE OF INSULIN (MULTI): ICD-10-CM

## 2023-09-14 DIAGNOSIS — E11.21 TYPE 2 DIABETES MELLITUS WITH DIABETIC NEPHROPATHY, WITH LONG-TERM CURRENT USE OF INSULIN (MULTI): ICD-10-CM

## 2023-09-14 DIAGNOSIS — J45.909 ASTHMA, UNSPECIFIED ASTHMA SEVERITY, UNSPECIFIED WHETHER COMPLICATED, UNSPECIFIED WHETHER PERSISTENT (HHS-HCC): ICD-10-CM

## 2023-09-14 RX ORDER — INSULIN LISPRO 200 [IU]/ML
INJECTION, SOLUTION SUBCUTANEOUS
Qty: 9 ML | Refills: 3 | Status: SHIPPED | OUTPATIENT
Start: 2023-09-14 | End: 2023-11-09 | Stop reason: SDUPTHER

## 2023-09-14 RX ORDER — SALMETEROL XINAFOATE 50 UG/1
1 POWDER, METERED ORAL; RESPIRATORY (INHALATION) EVERY 12 HOURS
Qty: 1 EACH | Refills: 1 | Status: SHIPPED | OUTPATIENT
Start: 2023-09-14 | End: 2023-11-27 | Stop reason: SDUPTHER

## 2023-09-25 ENCOUNTER — LAB (OUTPATIENT)
Dept: LAB | Facility: LAB | Age: 73
End: 2023-09-25
Payer: MEDICARE

## 2023-09-25 DIAGNOSIS — E78.5 HYPERLIPIDEMIA, UNSPECIFIED HYPERLIPIDEMIA TYPE: ICD-10-CM

## 2023-09-25 DIAGNOSIS — E11.69 TYPE 2 DIABETES MELLITUS WITH OTHER SPECIFIED COMPLICATION, WITH LONG-TERM CURRENT USE OF INSULIN (MULTI): ICD-10-CM

## 2023-09-25 DIAGNOSIS — Z79.4 TYPE 2 DIABETES MELLITUS WITH OTHER SPECIFIED COMPLICATION, WITH LONG-TERM CURRENT USE OF INSULIN (MULTI): ICD-10-CM

## 2023-09-25 DIAGNOSIS — I10 ESSENTIAL HYPERTENSION: ICD-10-CM

## 2023-09-25 LAB
ALANINE AMINOTRANSFERASE (SGPT) (U/L) IN SER/PLAS: 27 U/L (ref 7–45)
ALBUMIN (G/DL) IN SER/PLAS: 4.2 G/DL (ref 3.4–5)
ALBUMIN (MG/L) IN URINE: 11.8 MG/L
ALBUMIN/CREATININE (UG/MG) IN URINE: 7.8 UG/MG CRT (ref 0–30)
ALKALINE PHOSPHATASE (U/L) IN SER/PLAS: 74 U/L (ref 33–136)
ANION GAP IN SER/PLAS: 16 MMOL/L (ref 10–20)
ASPARTATE AMINOTRANSFERASE (SGOT) (U/L) IN SER/PLAS: 33 U/L (ref 9–39)
BILIRUBIN TOTAL (MG/DL) IN SER/PLAS: 1.6 MG/DL (ref 0–1.2)
CALCIUM (MG/DL) IN SER/PLAS: 9.7 MG/DL (ref 8.6–10.6)
CARBON DIOXIDE, TOTAL (MMOL/L) IN SER/PLAS: 23 MMOL/L (ref 21–32)
CHLORIDE (MMOL/L) IN SER/PLAS: 104 MMOL/L (ref 98–107)
CHOLESTEROL (MG/DL) IN SER/PLAS: 212 MG/DL (ref 0–199)
CHOLESTEROL IN HDL (MG/DL) IN SER/PLAS: 57.9 MG/DL
CHOLESTEROL/HDL RATIO: 3.7
CREATININE (MG/DL) IN SER/PLAS: 1.06 MG/DL (ref 0.5–1.05)
CREATININE (MG/DL) IN URINE: 151 MG/DL (ref 20–320)
ERYTHROCYTE DISTRIBUTION WIDTH (RATIO) BY AUTOMATED COUNT: 12.9 % (ref 11.5–14.5)
ERYTHROCYTE MEAN CORPUSCULAR HEMOGLOBIN CONCENTRATION (G/DL) BY AUTOMATED: 31.6 G/DL (ref 32–36)
ERYTHROCYTE MEAN CORPUSCULAR VOLUME (FL) BY AUTOMATED COUNT: 96 FL (ref 80–100)
ERYTHROCYTES (10*6/UL) IN BLOOD BY AUTOMATED COUNT: 4.74 X10E12/L (ref 4–5.2)
ESTIMATED AVERAGE GLUCOSE FOR HBA1C: 212 MG/DL
GFR FEMALE: 55 ML/MIN/1.73M2
GLUCOSE (MG/DL) IN SER/PLAS: 232 MG/DL (ref 74–99)
HEMATOCRIT (%) IN BLOOD BY AUTOMATED COUNT: 45.3 % (ref 36–46)
HEMOGLOBIN (G/DL) IN BLOOD: 14.3 G/DL (ref 12–16)
HEMOGLOBIN A1C/HEMOGLOBIN TOTAL IN BLOOD: 9 %
LDL: 124 MG/DL (ref 0–99)
LEUKOCYTES (10*3/UL) IN BLOOD BY AUTOMATED COUNT: 7.3 X10E9/L (ref 4.4–11.3)
NRBC (PER 100 WBCS) BY AUTOMATED COUNT: 0 /100 WBC (ref 0–0)
PLATELETS (10*3/UL) IN BLOOD AUTOMATED COUNT: 200 X10E9/L (ref 150–450)
POTASSIUM (MMOL/L) IN SER/PLAS: 4.2 MMOL/L (ref 3.5–5.3)
PROTEIN TOTAL: 7.2 G/DL (ref 6.4–8.2)
SODIUM (MMOL/L) IN SER/PLAS: 139 MMOL/L (ref 136–145)
TRIGLYCERIDE (MG/DL) IN SER/PLAS: 153 MG/DL (ref 0–149)
UREA NITROGEN (MG/DL) IN SER/PLAS: 17 MG/DL (ref 6–23)
VLDL: 31 MG/DL (ref 0–40)

## 2023-09-25 PROCEDURE — 82570 ASSAY OF URINE CREATININE: CPT

## 2023-09-25 PROCEDURE — 36415 COLL VENOUS BLD VENIPUNCTURE: CPT

## 2023-09-25 PROCEDURE — 85027 COMPLETE CBC AUTOMATED: CPT

## 2023-09-25 PROCEDURE — 80053 COMPREHEN METABOLIC PANEL: CPT

## 2023-09-25 PROCEDURE — 83036 HEMOGLOBIN GLYCOSYLATED A1C: CPT

## 2023-09-25 PROCEDURE — 82043 UR ALBUMIN QUANTITATIVE: CPT

## 2023-09-25 PROCEDURE — 80061 LIPID PANEL: CPT

## 2023-09-27 NOTE — PROGRESS NOTES
Subjective   Patient ID: Shanika Knowles is a 73 y.o. female who presents for Follow-up (Pt presents for 3 month follow up HTN, DM, HLD- pt states no concerns at this time. ).    HPI     Patient presents today for 3 month follow-up of DM.    Requesting albuterol nebulizer to [place].     --DM  Co-managed with [facility] pharmacy     Per 6/2023 OV with PCP, A1c trending up, currently co-managed with [facility] pharmacy, patient to continue to follow-up with them as schedule regarding insulin titration. We briefly discussed doing trial with GLP-1 injectables other than Ozempic, may further discuss options available with pharmacy. Patient disinclined to pursue if cost prohibitive.    Last [facility] pharm note 6/9/2023    Patient admits there is room for improvement in her dietary measures.  Notices lower sugars when she restricts sugar/sweets.    Had glucose in 70s in the evening, had coke and dinner.  She does not take meal time insulin if gets low reading before meals.  States this happens most nights, at least 2-3 times per week.  States she does not eat snacks during the day.  Fasting glucoses ranging in 170s-200s    Hgb A1c: 9.0 in 9/2023, 8.8 in 6/2023, 9.9 in 3/2023, 7.9 in 9/2022  Albumin: negative 9/2023  Foot exam: 9/2023 - also sees podiatry  Eye exam: 11/2022 - patient reports done in June 2023    Current regimen:  Humalog 12-18u tid w meals  Levemir 50u AM only    Prior medications:   Metformin , unable to tolerate 2/2 hives and migraines.  Pioglitazone, unable to tolerate 2/2 hives and migraines.  Januvia, , unable to tolerate 2/2 migraines.  Jardiance, unable to tolerate 2/2 side effects & decrease in kidney fxn.  Ozempic, unable to tolerate 2/2 due to joint and muscle pain.      OTHER CHRONIC CONDITIONS:  -HTN  Taking Losartan 25 mg daily.    -HLD/CAD  Lifestyle managed, does take baby aspirin every other day.  Patient previously disinclined to start statin therapy due to possible side  effects.  5/2021 CACS was 205.36.  1/2022 TC/HDL ratio 4.2 ()  9/2023 TC/HDL ratio 3.7 ().    -HYPOTHYROIDISM  CURRENT DOSE: Synthroid 112 mcg daily.   6/2023 TSH was normal, to be checked annually.    Review of Systems   All other systems reviewed and are negative.      Objective   /68 (BP Location: Right arm, Patient Position: Sitting, BP Cuff Size: Large adult)   Pulse 68   Temp 36.7 °C (98 °F) (Temporal)   Resp 16   Wt 100 kg (220 lb 14.4 oz)   SpO2 96%   BMI 35.65 kg/m²     Physical Exam  Vitals and nursing note reviewed.   Constitutional:       General: She is not in acute distress.     Appearance: Normal appearance. She is not toxic-appearing.   HENT:      Head: Normocephalic and atraumatic.   Cardiovascular:      Rate and Rhythm: Normal rate and regular rhythm.      Heart sounds: No murmur heard.     No friction rub. No gallop.   Pulmonary:      Effort: Pulmonary effort is normal.      Breath sounds: Normal breath sounds. No wheezing, rhonchi or rales.   Musculoskeletal:      Right lower leg: No edema.      Left lower leg: No edema.   Feet:      Comments: Diabetic Foot Exam:  Right Foot Findings: Normal visual exam. Toes were normal. No skin breakdown.  Left Foot Findings: Normal visual exam. Toes were normal. No skin breakdown.  Monofilament Testing: Normal tactile sensation with monofilament testing throughout both feet.    R FOOT: Normal alignment and arch. Skin, nails, color, turgor are normal. No pain to palpation. Sensation intact. Dorsalis pedis pulses normal. Full range of motion of all joints. Gait normal.    L FOOT: Normal alignment and arch. Skin, nails, color, turgor are normal. No pain to palpation. Sensation intact. Dorsalis pedis pulses normal. Full range of motion of all joints. Gait normal.   Neurological:      General: No focal deficit present.      Mental Status: She is alert and oriented to person, place, and time.   Psychiatric:         Mood and Affect: Mood  normal.         Behavior: Behavior normal.         Assessment/Plan   Problem List Items Addressed This Visit             ICD-10-CM    Asthma J45.909     Albuterol nebulizer refilled at patient request for her to have incase needed while traveling.         Relevant Medications    albuterol 2.5 mg /3 mL (0.083 %) nebulizer solution    Essential hypertension I10     BP is 119/68 in office today, good control.  Goal BP is 130/80 or less, ideally 120/80 or less.   Continue Losartan 25 mg daily.  Continue with home monitoring.         Hyperlipidemia E78.5     Lifestyle managed, does take baby aspirin every other day.  Patient previously disinclined to start statin therapy due to possible side effects  5/2021 CACS was 205.36.  1/2022 TC/HDL ratio 4.2 ()  9/2023 TC/HDL ratio 3.7 ().  Cholesterol improved from prior - PATIENT COMMENDED FOR THIS PROGRESS :)  Goal TC/HDL ratio 3.4 or less, LDL 99 or less,  or less, and TRIG 150 or less.  Diet and exercise recommendations revisited.         Class 2 obesity with body mass index (BMI) of 35.0 to 35.9 in adult E66.9, Z68.35    Type 2 diabetes mellitus, with long-term current use of insulin (CMS/[facility]) - Primary E11.9, Z79.4     Hgb A1c: 9.0 in 9/2023, 8.8 in 6/2023, 9.9 in 3/2023, 7.9 in 9/2022  Albumin: negative 9/2023    Current regimen:  Humalog 12-18u tid w meals  Levemir 50u AM only - states unable to sleep if takes this at night.    Co-managed with [facility] pharmacy, since patient reports some hypoglycemia however her A1c remains elevated at 9.0 I will refer her back to the pharmacist to help with insulin dosing/timing and to see if elevated for continuous glucose monitor (states was not covered by insurance in the past).    Consider trying glucose tablets rather than the coke.  We discussed titrating insulin may require patient to have small frequent snacks rather than large meals - to further discuss with pharmacy.    Patient having to testing sugars often for  insulin titration, A1c remains sub-optimally controlled and patient has tried and failed numerous other diabetic medication in past. I do feel she would benefit from continuous glucose monitoring system such as DEXCOM, patient to further discuss with pharmacy as she states was not covered by insurance in past.         Relevant Orders    Follow Up In [facility] - Pharmacy     Other Visit Diagnoses         Codes    Other screening mammogram     Z12.31            Follow-up in 3 months for recheck DM + MWV.  IO A1c upon rooming.  Call for sooner follow-up if needed.     Scribe Attestation  By signing my name below, I, Vladimir Prieto   attest that this documentation has been prepared under the direction and in the presence of Sangita Telles DO.

## 2023-09-28 ENCOUNTER — OFFICE VISIT (OUTPATIENT)
Dept: PRIMARY CARE | Facility: CLINIC | Age: 73
End: 2023-09-28
Payer: MEDICARE

## 2023-09-28 VITALS
BODY MASS INDEX: 35.65 KG/M2 | TEMPERATURE: 98 F | WEIGHT: 220.9 LBS | SYSTOLIC BLOOD PRESSURE: 119 MMHG | DIASTOLIC BLOOD PRESSURE: 68 MMHG | HEART RATE: 68 BPM | RESPIRATION RATE: 16 BRPM | OXYGEN SATURATION: 96 %

## 2023-09-28 DIAGNOSIS — J45.909 ASTHMA, UNSPECIFIED ASTHMA SEVERITY, UNSPECIFIED WHETHER COMPLICATED, UNSPECIFIED WHETHER PERSISTENT (HHS-HCC): ICD-10-CM

## 2023-09-28 DIAGNOSIS — Z79.4 TYPE 2 DIABETES MELLITUS WITH OTHER SPECIFIED COMPLICATION, WITH LONG-TERM CURRENT USE OF INSULIN (MULTI): Primary | ICD-10-CM

## 2023-09-28 DIAGNOSIS — E78.5 HYPERLIPIDEMIA, UNSPECIFIED HYPERLIPIDEMIA TYPE: ICD-10-CM

## 2023-09-28 DIAGNOSIS — E66.9 CLASS 2 OBESITY WITH BODY MASS INDEX (BMI) OF 35.0 TO 35.9 IN ADULT, UNSPECIFIED OBESITY TYPE, UNSPECIFIED WHETHER SERIOUS COMORBIDITY PRESENT: ICD-10-CM

## 2023-09-28 DIAGNOSIS — E11.69 TYPE 2 DIABETES MELLITUS WITH OTHER SPECIFIED COMPLICATION, WITH LONG-TERM CURRENT USE OF INSULIN (MULTI): Primary | ICD-10-CM

## 2023-09-28 DIAGNOSIS — I10 ESSENTIAL HYPERTENSION: ICD-10-CM

## 2023-09-28 DIAGNOSIS — Z12.31 OTHER SCREENING MAMMOGRAM: ICD-10-CM

## 2023-09-28 PROCEDURE — 1036F TOBACCO NON-USER: CPT | Performed by: FAMILY MEDICINE

## 2023-09-28 PROCEDURE — 3052F HG A1C>EQUAL 8.0%<EQUAL 9.0%: CPT | Performed by: FAMILY MEDICINE

## 2023-09-28 PROCEDURE — 3008F BODY MASS INDEX DOCD: CPT | Performed by: FAMILY MEDICINE

## 2023-09-28 PROCEDURE — 99214 OFFICE O/P EST MOD 30 MIN: CPT | Performed by: FAMILY MEDICINE

## 2023-09-28 PROCEDURE — 3074F SYST BP LT 130 MM HG: CPT | Performed by: FAMILY MEDICINE

## 2023-09-28 PROCEDURE — 3078F DIAST BP <80 MM HG: CPT | Performed by: FAMILY MEDICINE

## 2023-09-28 PROCEDURE — 1159F MED LIST DOCD IN RCRD: CPT | Performed by: FAMILY MEDICINE

## 2023-09-28 PROCEDURE — 3066F NEPHROPATHY DOC TX: CPT | Performed by: FAMILY MEDICINE

## 2023-09-28 PROCEDURE — 1160F RVW MEDS BY RX/DR IN RCRD: CPT | Performed by: FAMILY MEDICINE

## 2023-09-28 PROCEDURE — 4010F ACE/ARB THERAPY RXD/TAKEN: CPT | Performed by: FAMILY MEDICINE

## 2023-09-28 RX ORDER — ALBUTEROL SULFATE 0.83 MG/ML
2.5 SOLUTION RESPIRATORY (INHALATION)
COMMUNITY
End: 2023-09-28 | Stop reason: SDUPTHER

## 2023-09-28 RX ORDER — ALBUTEROL SULFATE 90 UG/1
1-2 AEROSOL, METERED RESPIRATORY (INHALATION) EVERY 4 HOURS PRN
COMMUNITY

## 2023-09-28 RX ORDER — ALBUTEROL SULFATE 0.83 MG/ML
2.5 SOLUTION RESPIRATORY (INHALATION) EVERY 6 HOURS PRN
Qty: 75 ML | Refills: 0 | Status: SHIPPED | OUTPATIENT
Start: 2023-09-28 | End: 2023-10-16 | Stop reason: SDUPTHER

## 2023-09-28 NOTE — ASSESSMENT & PLAN NOTE
Hgb A1c: 9.0 in 9/2023, 8.8 in 6/2023, 9.9 in 3/2023, 7.9 in 9/2022  Albumin: negative 9/2023    Current regimen:  Humalog 12-18u tid w meals  Levemir 50u AM only - states unable to sleep if takes this at night.    Co-managed with [facility] pharmacy, since patient reports some hypoglycemia however her A1c remains elevated at 9.0 I will refer her back to the pharmacist to help with insulin dosing/timing and to see if elevated for continuous glucose monitor (states was not covered by insurance in the past).    Consider trying glucose tablets rather than the coke.  We discussed titrating insulin may require patient to have small frequent snacks rather than large meals - to further discuss with pharmacy.    Patient having to testing sugars often for insulin titration, A1c remains sub-optimally controlled and patient has tried and failed numerous other diabetic medication in past. I do feel she would benefit from continuous glucose monitoring system such as DEXCOM, patient to further discuss with pharmacy as she states was not covered by insurance in past.

## 2023-09-28 NOTE — ASSESSMENT & PLAN NOTE
Lifestyle managed, does take baby aspirin every other day.  Patient previously disinclined to start statin therapy due to possible side effects  5/2021 CACS was 205.36.  1/2022 TC/HDL ratio 4.2 ()  9/2023 TC/HDL ratio 3.7 ().  Cholesterol improved from prior - PATIENT COMMENDED FOR THIS PROGRESS :)  Goal TC/HDL ratio 3.4 or less, LDL 99 or less,  or less, and TRIG 150 or less.  Diet and exercise recommendations revisited.

## 2023-09-28 NOTE — ASSESSMENT & PLAN NOTE
BP is 119/68 in office today, good control.  Goal BP is 130/80 or less, ideally 120/80 or less.   Continue Losartan 25 mg daily.  Continue with home monitoring.

## 2023-10-10 DIAGNOSIS — J45.909 ASTHMA, UNSPECIFIED ASTHMA SEVERITY, UNSPECIFIED WHETHER COMPLICATED, UNSPECIFIED WHETHER PERSISTENT (HHS-HCC): ICD-10-CM

## 2023-10-10 DIAGNOSIS — I10 ESSENTIAL HYPERTENSION: ICD-10-CM

## 2023-10-10 RX ORDER — LOSARTAN POTASSIUM 25 MG/1
25 TABLET ORAL DAILY
Qty: 90 TABLET | Refills: 1 | Status: SHIPPED | OUTPATIENT
Start: 2023-10-10 | End: 2024-04-04 | Stop reason: SDUPTHER

## 2023-10-10 RX ORDER — MONTELUKAST SODIUM 10 MG/1
10 TABLET ORAL NIGHTLY
Qty: 90 TABLET | Refills: 1 | Status: SHIPPED | OUTPATIENT
Start: 2023-10-10 | End: 2024-04-04 | Stop reason: SDUPTHER

## 2023-10-12 ENCOUNTER — TELEMEDICINE (OUTPATIENT)
Dept: PHARMACY | Facility: HOSPITAL | Age: 73
End: 2023-10-12
Payer: MEDICARE

## 2023-10-12 DIAGNOSIS — E11.69 TYPE 2 DIABETES MELLITUS WITH OTHER SPECIFIED COMPLICATION, WITH LONG-TERM CURRENT USE OF INSULIN (MULTI): ICD-10-CM

## 2023-10-12 DIAGNOSIS — Z79.4 TYPE 2 DIABETES MELLITUS WITH OTHER SPECIFIED COMPLICATION, WITH LONG-TERM CURRENT USE OF INSULIN (MULTI): ICD-10-CM

## 2023-10-12 NOTE — ASSESSMENT & PLAN NOTE
Patient currently uncontrolled with A1c of 9% (goal of <8%).  Patient has tried multiple medications in the past but were discontinued due to side effects.  Discussed trying Trulicity ($50 for a months supply) however patient did not want to start medication at this time until dinner time hypoglycemia is under control.      PLAN:  - Due to patients multiple hypoglycemic effects will decrease Humalog to 14 units with dinner.    - Continue Levemir 50 units in the morning and Humalog 18 units with breakfast and lunch.    - [facility] will look into coverage for CGM through DME company.    - Continue to test blood sugar levels and watch for signs/symptoms of hypoglycemia.  Instructed patient to call if having hypoglycemic events.  Patient verbalized understanding.      Follow up: 2 weeks

## 2023-10-12 NOTE — PROGRESS NOTES
Subjective   Patient ID: Shanika Knowles is a 73 y.o. female who presents for Diabetes.    Referring Provider: Sangita Telles DO     Diabetes  She presents for her follow-up diabetic visit. She has type 2 diabetes mellitus. Her disease course has been worsening. (Patient states she has multiple low blood sugars in the 60's throughout the week.  ) Hypoglycemia complications include required assistance (Patient drinks a coke to help with hypoglycemic episodes). Her weight is increasing steadily. She is following a generally unhealthy diet. Her home blood glucose trend is fluctuating dramatically.     Patient does report symptoms of hypoglycemia.   - Patient has reported low in  60-70 and has made her feel bad. She has take a 4oz coke or 1 tablespoon of honey to increase her blood sugar when this happens. She states it happen in the evening and has been happening a few times each week.    Patient does not report symptoms of hyperglycemia.     Review of Systems    Objective     There were no vitals taken for this visit.     Labs  Lab Results   Component Value Date    BILITOT 1.6 (H) 09/25/2023    CALCIUM 9.7 09/25/2023    CO2 23 09/25/2023     09/25/2023    CREATININE 1.06 (H) 09/25/2023    GLUCOSE 232 (H) 09/25/2023    ALKPHOS 74 09/25/2023    K 4.2 09/25/2023    PROT 7.2 09/25/2023     09/25/2023    AST 33 09/25/2023    ALT 27 09/25/2023    BUN 17 09/25/2023    ANIONGAP 16 09/25/2023    ALBUMIN 4.2 09/25/2023    GFRF 55 (A) 09/25/2023     Lab Results   Component Value Date    TRIG 153 (H) 09/25/2023    CHOL 212 (H) 09/25/2023    HDL 57.9 09/25/2023     Lab Results   Component Value Date    HGBA1C 9.0 (A) 09/25/2023       Current Outpatient Medications on File Prior to Visit   Medication Sig Dispense Refill    albuterol 2.5 mg /3 mL (0.083 %) nebulizer solution Take 3 mL (2.5 mg) by nebulization every 6 hours if needed for wheezing. 75 mL 0    albuterol 90 mcg/actuation inhaler Inhale 1-2 puffs every 4  "hours if needed.      aspirin 81 mg chewable tablet once every 24 hours.      HumaLOG KwikPen Insulin 200 unit/mL (3 mL) insulin pen pen ADMINISTER 18 UNITS UNDER THE SKIN THREE TIMES DAILY 9 mL 3    insulin detemir (Levemir FlexPen) 100 unit/mL (3 mL) pen Inject 25 units under the skin every morning and 27 units under the skin every night at bedtime. 45 mL 3    lancets (OneTouch Delica Lancets) 33 gauge misc Use to test blood glucose three times daily 300 each 3    levothyroxine (Tirosint) 112 mcg capsule Take by mouth once daily in the morning. Take before meals.      losartan (Cozaar) 25 mg tablet Take 1 tablet (25 mg) by mouth once daily. 90 tablet 1    montelukast (Singulair) 10 mg tablet Take 1 tablet (10 mg) by mouth once daily at bedtime. 90 tablet 1    OneTouch Ultra Test strip Use to test blood glucose three times daily 300 strip 3    pen needle, diabetic (BD Melvi 2nd Gen Pen Needle) 32 gauge x 5/32\" needle 1 Needle 5 times a day. 500 each 1    salmeterol (Serevent Diskus) 50 mcg/dose diskus inhaler Inhale 1 puff every 12 hours. 1 each 1    [DISCONTINUED] losartan (Cozaar) 25 mg tablet Take 1 tablet (25 mg) by mouth once daily.      [DISCONTINUED] montelukast (Singulair) 10 mg tablet Take 1 tablet (10 mg) by mouth once daily at bedtime.       No current facility-administered medications on file prior to visit.      CURRENT PHARMACOTHERAPY  - Humalog 200u/mL 18u tid w meals  - Levemir 100u/mL 50 units in the morning ( patient experiences muscle pain and joint pain at higher doses)    HISTORICAL PHARMACOTHERAPY  - Ozempic caused joint and muscle pain  - Jardiance caused decrease in kidney function  - Metformin caused severe migraines  - Januvia caused headaches   - Pioglitazone caused pain  - exenatide cause headaches    Assessment/Plan   Problem List Items Addressed This Visit       Type 2 diabetes mellitus, with long-term current use of insulin (CMS/[facility])     Patient currently uncontrolled with A1c of 9% " (goal of <8%).  Patient has tried multiple medications in the past but were discontinued due to side effects.  Discussed trying Trulicity ($50 for a months supply) however patient did not want to start medication at this time until dinner time hypoglycemia is under control.      PLAN:  - Due to patients multiple hypoglycemic effects will decrease Humalog to 14 units with dinner.    - Continue Levemir 50 units in the morning and Humalog 18 units with breakfast and lunch.    - [facility] will look into coverage for CGM through DME company.    - Continue to test blood sugar levels and watch for signs/symptoms of hypoglycemia.  Instructed patient to call if having hypoglycemic events.  Patient verbalized understanding.      Follow up: 2 weeks            Alanis Stock, PharmD    Continue all meds under the continuation of care with the referring provider and clinical pharmacy team.

## 2023-10-16 DIAGNOSIS — J45.909 ASTHMA, UNSPECIFIED ASTHMA SEVERITY, UNSPECIFIED WHETHER COMPLICATED, UNSPECIFIED WHETHER PERSISTENT (HHS-HCC): ICD-10-CM

## 2023-10-16 RX ORDER — ALBUTEROL SULFATE 0.83 MG/ML
2.5 SOLUTION RESPIRATORY (INHALATION) EVERY 6 HOURS PRN
Qty: 75 ML | Refills: 0 | Status: SHIPPED | OUTPATIENT
Start: 2023-10-16 | End: 2023-11-07 | Stop reason: SDUPTHER

## 2023-10-25 ENCOUNTER — ANCILLARY PROCEDURE (OUTPATIENT)
Dept: RADIOLOGY | Facility: CLINIC | Age: 73
End: 2023-10-25
Payer: MEDICARE

## 2023-10-25 DIAGNOSIS — Z12.31 OTHER SCREENING MAMMOGRAM: ICD-10-CM

## 2023-10-25 PROCEDURE — 77063 BREAST TOMOSYNTHESIS BI: CPT | Mod: BILATERAL PROCEDURE | Performed by: STUDENT IN AN ORGANIZED HEALTH CARE EDUCATION/TRAINING PROGRAM

## 2023-10-25 PROCEDURE — 77067 SCR MAMMO BI INCL CAD: CPT | Mod: 50

## 2023-10-25 PROCEDURE — 77067 SCR MAMMO BI INCL CAD: CPT | Mod: BILATERAL PROCEDURE | Performed by: STUDENT IN AN ORGANIZED HEALTH CARE EDUCATION/TRAINING PROGRAM

## 2023-10-25 PROCEDURE — 77063 BREAST TOMOSYNTHESIS BI: CPT

## 2023-10-26 ENCOUNTER — TELEMEDICINE (OUTPATIENT)
Dept: PHARMACY | Facility: HOSPITAL | Age: 73
End: 2023-10-26
Payer: MEDICARE

## 2023-10-26 DIAGNOSIS — E11.69 TYPE 2 DIABETES MELLITUS WITH OTHER SPECIFIED COMPLICATION, WITH LONG-TERM CURRENT USE OF INSULIN (MULTI): ICD-10-CM

## 2023-10-26 DIAGNOSIS — Z79.4 TYPE 2 DIABETES MELLITUS WITH OTHER SPECIFIED COMPLICATION, WITH LONG-TERM CURRENT USE OF INSULIN (MULTI): ICD-10-CM

## 2023-10-26 NOTE — PROGRESS NOTES
Subjective   Patient ID: Shanika Knowles is a 73 y.o. female who presents for Diabetes.    Referring Provider: Sangita Telles DO     Diabetes  She presents for her follow-up diabetic visit. She has type 2 diabetes mellitus. Her disease course has been worsening. (Patient states she has multiple low blood sugars in the 60's throughout the week.  ) Hypoglycemia complications include required assistance (Patient drinks a coke to help with hypoglycemic episodes). Her weight is increasing steadily. She is following a generally unhealthy diet. Her home blood glucose trend is fluctuating dramatically.     Patient does report symptoms of hypoglycemia.   - Patient has reported low in  60-70 and has made her feel bad. She has take a 4oz coke or 1 tablespoon of honey to increase her blood sugar when this happens. She states it happen in the evening and has been happening a few times each week.    Patient does not report symptoms of hyperglycemia.     Review of Systems    Objective     There were no vitals taken for this visit.     Labs  Lab Results   Component Value Date    BILITOT 1.6 (H) 09/25/2023    CALCIUM 9.7 09/25/2023    CO2 23 09/25/2023     09/25/2023    CREATININE 1.06 (H) 09/25/2023    GLUCOSE 232 (H) 09/25/2023    ALKPHOS 74 09/25/2023    K 4.2 09/25/2023    PROT 7.2 09/25/2023     09/25/2023    AST 33 09/25/2023    ALT 27 09/25/2023    BUN 17 09/25/2023    ANIONGAP 16 09/25/2023    ALBUMIN 4.2 09/25/2023    GFRF 55 (A) 09/25/2023     Lab Results   Component Value Date    TRIG 153 (H) 09/25/2023    CHOL 212 (H) 09/25/2023    HDL 57.9 09/25/2023     Lab Results   Component Value Date    HGBA1C 9.0 (A) 09/25/2023       Current Outpatient Medications on File Prior to Visit   Medication Sig Dispense Refill    albuterol 2.5 mg /3 mL (0.083 %) nebulizer solution Take 3 mL (2.5 mg) by nebulization every 6 hours if needed for wheezing. 75 mL 0    albuterol 90 mcg/actuation inhaler Inhale 1-2 puffs every 4  "hours if needed.      aspirin 81 mg chewable tablet once every 24 hours.      HumaLOG KwikPen Insulin 200 unit/mL (3 mL) insulin pen pen ADMINISTER 18 UNITS UNDER THE SKIN THREE TIMES DAILY 9 mL 3    insulin detemir (Levemir FlexPen) 100 unit/mL (3 mL) pen Inject 25 units under the skin every morning and 27 units under the skin every night at bedtime. 45 mL 3    lancets (OneTouch Delica Lancets) 33 gauge misc Use to test blood glucose three times daily 300 each 3    levothyroxine (Tirosint) 112 mcg capsule Take by mouth once daily in the morning. Take before meals.      losartan (Cozaar) 25 mg tablet Take 1 tablet (25 mg) by mouth once daily. 90 tablet 1    montelukast (Singulair) 10 mg tablet Take 1 tablet (10 mg) by mouth once daily at bedtime. 90 tablet 1    OneTouch Ultra Test strip Use to test blood glucose three times daily 300 strip 3    pen needle, diabetic (BD Melvi 2nd Gen Pen Needle) 32 gauge x 5/32\" needle 1 Needle 5 times a day. 500 each 1    salmeterol (Serevent Diskus) 50 mcg/dose diskus inhaler Inhale 1 puff every 12 hours. 1 each 1     No current facility-administered medications on file prior to visit.      CURRENT PHARMACOTHERAPY  - Humalog 200u/mL 18u tid w meals  - Levemir 100u/mL 50 units in the morning ( patient experiences muscle pain and joint pain at higher doses)    HISTORICAL PHARMACOTHERAPY  - Ozempic caused joint and muscle pain  - Jardiance caused decrease in kidney function  - Metformin caused severe migraines  - Januvia caused headaches   - Pioglitazone caused pain  - exenatide cause headaches    Assessment/Plan   Problem List Items Addressed This Visit       Type 2 diabetes mellitus, with long-term current use of insulin (CMS/[facility])     Patient currently uncontrolled with A1c of 9% (goal of <8%).  Patient has tried multiple medications in the past but were discontinued due to side effects.  Discussed trying Trulicity ($50 for a months supply) however patient did not want to start " medication at this time until dinner time hypoglycemia is under control.  Patient was able to receive Freestyle Soha 2 sensors from a medical supply company but has not started using them yet.  Since the last appointment patient has reported no hypoglycemia at dinner time and has been taking Humalog 16 units at dinner.        PLAN:  - Continue Humalog to 16 units with dinner.    - Continue Levemir 50 units in the morning and Humalog 18 units with breakfast and lunch.    - Instructed Patient to start using Freestyle soha sensors to capture glucose trends throughout the day.  These readings will help to adjust insulin doses.    - Continue to test blood sugar levels and watch for signs/symptoms of hypoglycemia.  Instructed patient to call if having hypoglycemic events.  Patient verbalized understanding.      Follow up: 2 weeks            Alanis Stock, PharmD    Continue all meds under the continuation of care with the referring provider and clinical pharmacy team.

## 2023-10-26 NOTE — ASSESSMENT & PLAN NOTE
Patient currently uncontrolled with A1c of 9% (goal of <8%).  Patient has tried multiple medications in the past but were discontinued due to side effects.  Discussed trying Trulicity ($50 for a months supply) however patient did not want to start medication at this time until dinner time hypoglycemia is under control.  Patient was able to receive Freestyle Soha 2 sensors from a medical supply company but has not started using them yet.  Since the last appointment patient has reported no hypoglycemia at dinner time and has been taking Humalog 16 units at dinner.        PLAN:  - Continue Humalog to 16 units with dinner.    - Continue Levemir 50 units in the morning and Humalog 18 units with breakfast and lunch.    - Instructed Patient to start using Freestyle soha sensors to capture glucose trends throughout the day.  These readings will help to adjust insulin doses.    - Continue to test blood sugar levels and watch for signs/symptoms of hypoglycemia.  Instructed patient to call if having hypoglycemic events.  Patient verbalized understanding.      Follow up: 2 weeks

## 2023-11-03 DIAGNOSIS — E03.9 ACQUIRED HYPOTHYROIDISM: ICD-10-CM

## 2023-11-03 RX ORDER — LEVOTHYROXINE SODIUM 112 UG/1
112 CAPSULE ORAL
Qty: 90 CAPSULE | Refills: 1 | Status: SHIPPED | OUTPATIENT
Start: 2023-11-03 | End: 2023-11-07 | Stop reason: SDUPTHER

## 2023-11-07 DIAGNOSIS — E03.9 ACQUIRED HYPOTHYROIDISM: ICD-10-CM

## 2023-11-07 DIAGNOSIS — J45.909 ASTHMA, UNSPECIFIED ASTHMA SEVERITY, UNSPECIFIED WHETHER COMPLICATED, UNSPECIFIED WHETHER PERSISTENT (HHS-HCC): ICD-10-CM

## 2023-11-07 RX ORDER — LEVOTHYROXINE SODIUM 112 UG/1
112 TABLET ORAL
Qty: 90 TABLET | Refills: 3 | Status: SHIPPED | OUTPATIENT
Start: 2023-11-07 | End: 2023-11-08 | Stop reason: ENTERED-IN-ERROR

## 2023-11-07 RX ORDER — ALBUTEROL SULFATE 0.83 MG/ML
2.5 SOLUTION RESPIRATORY (INHALATION) EVERY 6 HOURS PRN
Qty: 75 ML | Refills: 0 | Status: SHIPPED | OUTPATIENT
Start: 2023-11-07 | End: 2023-12-28 | Stop reason: WASHOUT

## 2023-11-08 DIAGNOSIS — E03.9 ACQUIRED HYPOTHYROIDISM: ICD-10-CM

## 2023-11-08 RX ORDER — LEVOTHYROXINE SODIUM 112 UG/1
112 TABLET ORAL DAILY
Qty: 90 TABLET | Refills: 3 | Status: SHIPPED | OUTPATIENT
Start: 2023-11-08 | End: 2024-11-07

## 2023-11-09 ENCOUNTER — TELEMEDICINE (OUTPATIENT)
Dept: PHARMACY | Facility: HOSPITAL | Age: 73
End: 2023-11-09
Payer: MEDICARE

## 2023-11-09 DIAGNOSIS — E11.69 TYPE 2 DIABETES MELLITUS WITH OTHER SPECIFIED COMPLICATION, WITH LONG-TERM CURRENT USE OF INSULIN (MULTI): ICD-10-CM

## 2023-11-09 DIAGNOSIS — Z79.4 TYPE 2 DIABETES MELLITUS WITH DIABETIC NEPHROPATHY, WITH LONG-TERM CURRENT USE OF INSULIN (MULTI): ICD-10-CM

## 2023-11-09 DIAGNOSIS — Z79.4 TYPE 2 DIABETES MELLITUS WITH OTHER SPECIFIED COMPLICATION, WITH LONG-TERM CURRENT USE OF INSULIN (MULTI): ICD-10-CM

## 2023-11-09 DIAGNOSIS — E11.65 UNCONTROLLED TYPE 2 DIABETES MELLITUS WITH HYPERGLYCEMIA (MULTI): ICD-10-CM

## 2023-11-09 DIAGNOSIS — E11.21 TYPE 2 DIABETES MELLITUS WITH DIABETIC NEPHROPATHY, WITH LONG-TERM CURRENT USE OF INSULIN (MULTI): ICD-10-CM

## 2023-11-09 RX ORDER — INSULIN LISPRO 200 [IU]/ML
INJECTION, SOLUTION SUBCUTANEOUS
Qty: 9 ML | Refills: 3 | Status: SHIPPED | OUTPATIENT
Start: 2023-11-09 | End: 2023-12-26 | Stop reason: SDUPTHER

## 2023-11-09 RX ORDER — PEN NEEDLE, DIABETIC 30 GX3/16"
1 NEEDLE, DISPOSABLE MISCELLANEOUS 4 TIMES DAILY
Qty: 400 EACH | Refills: 1 | Status: SHIPPED | OUTPATIENT
Start: 2023-11-09 | End: 2024-02-27 | Stop reason: SDUPTHER

## 2023-11-09 RX ORDER — INSULIN DETEMIR 100 [IU]/ML
INJECTION, SOLUTION SUBCUTANEOUS
Qty: 45 ML | Refills: 3 | Status: SHIPPED | OUTPATIENT
Start: 2023-11-09 | End: 2023-12-26 | Stop reason: SDUPTHER

## 2023-11-09 RX ORDER — BLOOD SUGAR DIAGNOSTIC
STRIP MISCELLANEOUS
Qty: 300 STRIP | Refills: 3 | Status: SHIPPED | OUTPATIENT
Start: 2023-11-09 | End: 2024-02-27 | Stop reason: SDUPTHER

## 2023-11-09 NOTE — Clinical Note
Hi,  The patient is switching pharmacies and wanted new prescriptions for her synthroid, serovent diskus, montelukast, losartan, and Albuterol HFA inhaler sent over to [facility]. She tried to transfer the old scripts over but [facility] would not send them.  Please let me know if you have any questions or concerns.  Thank you!

## 2023-11-09 NOTE — ASSESSMENT & PLAN NOTE
Patient currently uncontrolled with A1c of 9% (goal of <8%).  Patient has tried multiple medications in the past but were discontinued due to side effects.  Discussed trying Trulicity ($50 for a months supply) however patient did not want to start medication at this time until dinner time hypoglycemia is under control.  Patient was able to receive Freestyle Soha 2 sensors from a medical supply company but has not started using them yet.  Since the last appointment patient has reported no hypoglycemia at dinner time and has been taking Humalog 16 units at dinner.        PLAN:  - Continue Humalog to 16 units with dinner.    - Continue Levemir 50 units in the morning and Humalog 18 units with breakfast and lunch.    - Will schedule telephone call on Monday 11/13 to set up freestyle soha 2 sensors to monitor blood sugar.    - Continue to test blood sugar levels and watch for signs/symptoms of hypoglycemia.  Instructed patient to call if having hypoglycemic events.  Patient verbalized understanding.    - Patient is switching all of her prescriptions over to a new pharmacy but requested new prescriptions sent over because [facility] will not transfer them over.      Follow up: Monday 11/13  @ 3pm

## 2023-11-09 NOTE — PROGRESS NOTES
Subjective   Patient ID: Shanika Knowles is a 73 y.o. female who presents for Diabetes.    Referring Provider: Sangita Telles DO     Diabetes  She presents for her follow-up diabetic visit. She has type 2 diabetes mellitus. Her disease course has been worsening. (Patient states she has multiple low blood sugars in the 60's throughout the week.  ) Hypoglycemia complications include required assistance (Patient drinks a coke to help with hypoglycemic episodes). Her weight is increasing steadily. She is following a generally unhealthy diet. Her home blood glucose trend is fluctuating dramatically.     Patient does report symptoms of hypoglycemia.   - Patient has reported low in  60-70 and has made her feel bad. She has take a 4oz coke or 1 tablespoon of honey to increase her blood sugar when this happens. She states it happen in the evening and has been happening a few times each week.    Patient does not report symptoms of hyperglycemia.     Review of Systems    Objective     There were no vitals taken for this visit.     Labs  Lab Results   Component Value Date    BILITOT 1.6 (H) 09/25/2023    CALCIUM 9.7 09/25/2023    CO2 23 09/25/2023     09/25/2023    CREATININE 1.06 (H) 09/25/2023    GLUCOSE 232 (H) 09/25/2023    ALKPHOS 74 09/25/2023    K 4.2 09/25/2023    PROT 7.2 09/25/2023     09/25/2023    AST 33 09/25/2023    ALT 27 09/25/2023    BUN 17 09/25/2023    ANIONGAP 16 09/25/2023    ALBUMIN 4.2 09/25/2023    GFRF 55 (A) 09/25/2023     Lab Results   Component Value Date    TRIG 153 (H) 09/25/2023    CHOL 212 (H) 09/25/2023    HDL 57.9 09/25/2023     Lab Results   Component Value Date    HGBA1C 9.0 (A) 09/25/2023       Current Outpatient Medications on File Prior to Visit   Medication Sig Dispense Refill    albuterol 2.5 mg /3 mL (0.083 %) nebulizer solution Take 3 mL (2.5 mg) by nebulization every 6 hours if needed for wheezing. 75 mL 0    albuterol 90 mcg/actuation inhaler Inhale 1-2 puffs every 4  "hours if needed.      aspirin 81 mg chewable tablet once every 24 hours.      HumaLOG KwikPen Insulin 200 unit/mL (3 mL) insulin pen pen ADMINISTER 18 UNITS UNDER THE SKIN THREE TIMES DAILY 9 mL 3    insulin detemir (Levemir FlexPen) 100 unit/mL (3 mL) pen Inject 25 units under the skin every morning and 27 units under the skin every night at bedtime. 45 mL 3    lancets (OneTouch Delica Lancets) 33 gauge misc Use to test blood glucose three times daily 300 each 3    losartan (Cozaar) 25 mg tablet Take 1 tablet (25 mg) by mouth once daily. 90 tablet 1    montelukast (Singulair) 10 mg tablet Take 1 tablet (10 mg) by mouth once daily at bedtime. 90 tablet 1    OneTouch Ultra Test strip Use to test blood glucose three times daily 300 strip 3    pen needle, diabetic (BD Melvi 2nd Gen Pen Needle) 32 gauge x 5/32\" needle 1 Needle 5 times a day. 500 each 1    salmeterol (Serevent Diskus) 50 mcg/dose diskus inhaler Inhale 1 puff every 12 hours. 1 each 1    Synthroid 112 mcg tablet Take 1 tablet (112 mcg) by mouth once daily. 90 tablet 3    [DISCONTINUED] albuterol 2.5 mg /3 mL (0.083 %) nebulizer solution Take 3 mL (2.5 mg) by nebulization every 6 hours if needed for wheezing. 75 mL 0    [DISCONTINUED] levothyroxine (Tirosint) 112 mcg capsule Take by mouth once daily in the morning. Take before meals.      [DISCONTINUED] levothyroxine (Tirosint) 112 mcg capsule Take 1 capsule (112 mcg) by mouth once daily in the morning. Take before meals. 90 capsule 1    [DISCONTINUED] LevoxyL 112 mcg tablet Take 1 tablet (112 mcg) by mouth once daily in the morning. Take before meals. 90 tablet 3     No current facility-administered medications on file prior to visit.      CURRENT PHARMACOTHERAPY  - Humalog 200u/mL 18u tid w meals  - Levemir 100u/mL 50 units in the morning ( patient experiences muscle pain and joint pain at higher doses)    HISTORICAL PHARMACOTHERAPY  - Ozempic caused joint and muscle pain  - Jardiance caused decrease in " kidney function  - Metformin caused severe migraines  - Januvia caused headaches   - Pioglitazone caused pain  - exenatide cause headaches    Assessment/Plan   Problem List Items Addressed This Visit       Type 2 diabetes mellitus, with long-term current use of insulin (CMS/[facility])     Patient currently uncontrolled with A1c of 9% (goal of <8%).  Patient has tried multiple medications in the past but were discontinued due to side effects.  Discussed trying Trulicity ($50 for a months supply) however patient did not want to start medication at this time until dinner time hypoglycemia is under control.  Patient was able to receive Freestyle Soha 2 sensors from a medical supply company but has not started using them yet.  Since the last appointment patient has reported no hypoglycemia at dinner time and has been taking Humalog 16 units at dinner.        PLAN:  - Continue Humalog to 16 units with dinner.    - Continue Levemir 50 units in the morning and Humalog 18 units with breakfast and lunch.    - Will schedule telephone call on Monday 11/13 to set up freestyle soha 2 sensors to monitor blood sugar.    - Continue to test blood sugar levels and watch for signs/symptoms of hypoglycemia.  Instructed patient to call if having hypoglycemic events.  Patient verbalized understanding.    - Patient is switching all of her prescriptions over to a new pharmacy but requested new prescriptions sent over because [facility] will not transfer them over.      Follow up: Monday 11/13  @ 3pm           Other Visit Diagnoses       Uncontrolled type 2 diabetes mellitus with hyperglycemia (CMS/[facility])                Alanis Stock, PharmD    Continue all meds under the continuation of care with the referring provider and clinical pharmacy team.

## 2023-11-13 ENCOUNTER — TELEMEDICINE (OUTPATIENT)
Dept: PHARMACY | Facility: HOSPITAL | Age: 73
End: 2023-11-13
Payer: MEDICARE

## 2023-11-13 DIAGNOSIS — E11.69 TYPE 2 DIABETES MELLITUS WITH OTHER SPECIFIED COMPLICATION, WITH LONG-TERM CURRENT USE OF INSULIN (MULTI): ICD-10-CM

## 2023-11-13 DIAGNOSIS — Z79.4 TYPE 2 DIABETES MELLITUS WITH OTHER SPECIFIED COMPLICATION, WITH LONG-TERM CURRENT USE OF INSULIN (MULTI): ICD-10-CM

## 2023-11-13 NOTE — ASSESSMENT & PLAN NOTE
Patient currently uncontrolled with A1c of 9% (goal of <8%).  Patient has tried multiple medications in the past but were discontinued due to side effects.  Discussed trying Trulicity ($50 for a months supply) however patient did not want to start medication at this time until dinner time hypoglycemia is under control.  Patient was able to receive Freestyle Soha 2 sensors from a medical supply company but has not started using them yet.  Since the last appointment patient has reported no hypoglycemia at dinner time and has been taking Humalog 16 units at dinner.  Walked patient through instructions of how to apply and start using her Freestyle Soha sensors.  Patient was able to successfully place sensor and set up reader to start monitoring blood sugar levels       Freestyle Soha Education  Educated patient on how to use freestyle Soha.   Defined the difference between Glucose test strips Vs. Freestyle Soha.  Wear the sensor for 14 days. Every time you apply a new one, wait for 60 minutes before scanning.   Scan the sensor with the reader at least every 8 hours (morning, afternoon, and before bed time).   The reader will keep the average record of your 7 days and 14 days BG levels.  Test your blood sugar with the test strips to double check when your BG is running too low or too high.  An alarm will go off when your BG is very low and very high.  Always bring your cable to every in-patient visit to transfer the information on the reader to the computer. You can also download the otto on your phone and the pharmacy team will keep a track of your BG on Soha view.   If your sensor keeps falling off, contact the  and call the supplier for a new refill.    PLAN:  - Continue Humalog to 16 units with dinner.    - Continue Levemir 50 units in the morning and Humalog 18 units with breakfast and lunch.    - Continue to test blood sugar levels and watch for signs/symptoms of hypoglycemia.  Instructed patient  to call if having hypoglycemic events.  Patient verbalized understanding.    - Patient is switching all of her prescriptions over to a new pharmacy but requested new prescriptions sent over because [facility] will not transfer them over.      Follow up: 2 weeks

## 2023-11-13 NOTE — PROGRESS NOTES
Subjective   Patient ID: Shanika Knowles is a 73 y.o. female who presents for Diabetes.    Referring Provider: Sangita Telles DO     Diabetes  She presents for her follow-up diabetic visit. She has type 2 diabetes mellitus. Her disease course has been worsening. (Patient states she has multiple low blood sugars in the 60's throughout the week.  ) Hypoglycemia complications include required assistance (Patient drinks a coke to help with hypoglycemic episodes). Her weight is increasing steadily. She is following a generally unhealthy diet. Her home blood glucose trend is fluctuating dramatically.     Patient does report symptoms of hypoglycemia.   - Patient has reported low in  60-70 and has made her feel bad. She has take a 4oz coke or 1 tablespoon of honey to increase her blood sugar when this happens. She states it happen in the evening and has been happening a few times each week.    Patient does not report symptoms of hyperglycemia.     Review of Systems    Objective     There were no vitals taken for this visit.     Labs  Lab Results   Component Value Date    BILITOT 1.6 (H) 09/25/2023    CALCIUM 9.7 09/25/2023    CO2 23 09/25/2023     09/25/2023    CREATININE 1.06 (H) 09/25/2023    GLUCOSE 232 (H) 09/25/2023    ALKPHOS 74 09/25/2023    K 4.2 09/25/2023    PROT 7.2 09/25/2023     09/25/2023    AST 33 09/25/2023    ALT 27 09/25/2023    BUN 17 09/25/2023    ANIONGAP 16 09/25/2023    ALBUMIN 4.2 09/25/2023    GFRF 55 (A) 09/25/2023     Lab Results   Component Value Date    TRIG 153 (H) 09/25/2023    CHOL 212 (H) 09/25/2023    HDL 57.9 09/25/2023     Lab Results   Component Value Date    HGBA1C 9.0 (A) 09/25/2023       Current Outpatient Medications on File Prior to Visit   Medication Sig Dispense Refill    albuterol 2.5 mg /3 mL (0.083 %) nebulizer solution Take 3 mL (2.5 mg) by nebulization every 6 hours if needed for wheezing. 75 mL 0    albuterol 90 mcg/actuation inhaler Inhale 1-2 puffs every 4  "hours if needed.      aspirin 81 mg chewable tablet once every 24 hours.      HumaLOG KwikPen Insulin 200 unit/mL (3 mL) insulin pen pen ADMINISTER 18 UNITS UNDER THE SKIN THREE TIMES DAILY 9 mL 3    insulin detemir (Levemir FlexPen) 100 unit/mL (3 mL) pen Inject 50 units in the morning 45 mL 3    lancets (OneTouch Delica Lancets) 33 gauge misc Use to test blood glucose three times daily 300 each 3    losartan (Cozaar) 25 mg tablet Take 1 tablet (25 mg) by mouth once daily. 90 tablet 1    montelukast (Singulair) 10 mg tablet Take 1 tablet (10 mg) by mouth once daily at bedtime. 90 tablet 1    OneTouch Ultra Test strip Use to test blood glucose three times daily 300 strip 3    pen needle, diabetic (BD Melvi 2nd Gen Pen Needle) 32 gauge x 5/32\" needle 1 Needle 4 times a day. 400 each 1    salmeterol (Serevent Diskus) 50 mcg/dose diskus inhaler Inhale 1 puff every 12 hours. 1 each 1    Synthroid 112 mcg tablet Take 1 tablet (112 mcg) by mouth once daily. 90 tablet 3    [DISCONTINUED] albuterol 2.5 mg /3 mL (0.083 %) nebulizer solution Take 3 mL (2.5 mg) by nebulization every 6 hours if needed for wheezing. 75 mL 0    [DISCONTINUED] HumaLOG KwikPen Insulin 200 unit/mL (3 mL) insulin pen pen ADMINISTER 18 UNITS UNDER THE SKIN THREE TIMES DAILY 9 mL 3    [DISCONTINUED] insulin detemir (Levemir FlexPen) 100 unit/mL (3 mL) pen Inject 25 units under the skin every morning and 27 units under the skin every night at bedtime. 45 mL 3    [DISCONTINUED] levothyroxine (Tirosint) 112 mcg capsule Take 1 capsule (112 mcg) by mouth once daily in the morning. Take before meals. 90 capsule 1    [DISCONTINUED] LevoxyL 112 mcg tablet Take 1 tablet (112 mcg) by mouth once daily in the morning. Take before meals. 90 tablet 3    [DISCONTINUED] OneTouch Ultra Test strip Use to test blood glucose three times daily 300 strip 3    [DISCONTINUED] pen needle, diabetic (BD Melvi 2nd Gen Pen Needle) 32 gauge x 5/32\" needle 1 Needle 5 times a day. 500 " each 1     No current facility-administered medications on file prior to visit.      CURRENT PHARMACOTHERAPY  - Humalog 200u/mL 18u tid w meals  - Levemir 100u/mL 50 units in the morning ( patient experiences muscle pain and joint pain at higher doses)    HISTORICAL PHARMACOTHERAPY  - Ozempic caused joint and muscle pain  - Jardiance caused decrease in kidney function  - Metformin caused severe migraines  - Januvia caused headaches   - Pioglitazone caused pain  - exenatide cause headaches    Assessment/Plan   Problem List Items Addressed This Visit       Type 2 diabetes mellitus, with long-term current use of insulin (CMS/[facility])     Patient currently uncontrolled with A1c of 9% (goal of <8%).  Patient has tried multiple medications in the past but were discontinued due to side effects.  Discussed trying Trulicity ($50 for a months supply) however patient did not want to start medication at this time until dinner time hypoglycemia is under control.  Patient was able to receive Freestyle Soha 2 sensors from a medical supply company but has not started using them yet.  Since the last appointment patient has reported no hypoglycemia at dinner time and has been taking Humalog 16 units at dinner.  Walked patient through instructions of how to apply and start using her Freestyle Soha sensors.  Patient was able to successfully place sensor and set up reader to start monitoring blood sugar levels       Freestyle Soha Education  Educated patient on how to use freestyle Soha.   Defined the difference between Glucose test strips Vs. Freestyle Soha.  Wear the sensor for 14 days. Every time you apply a new one, wait for 60 minutes before scanning.   Scan the sensor with the reader at least every 8 hours (morning, afternoon, and before bed time).   The reader will keep the average record of your 7 days and 14 days BG levels.  Test your blood sugar with the test strips to double check when your BG is running too low or too  high.  An alarm will go off when your BG is very low and very high.  Always bring your cable to every in-patient visit to transfer the information on the reader to the computer. You can also download the otto on your phone and the pharmacy team will keep a track of your BG on [employer] view.   If your sensor keeps falling off, contact the  and call the supplier for a new refill.    PLAN:  - Continue Humalog to 16 units with dinner.    - Continue Levemir 50 units in the morning and Humalog 18 units with breakfast and lunch.    - Continue to test blood sugar levels and watch for signs/symptoms of hypoglycemia.  Instructed patient to call if having hypoglycemic events.  Patient verbalized understanding.    - Patient is switching all of her prescriptions over to a new pharmacy but requested new prescriptions sent over because [facility] will not transfer them over.      Follow up: 2 weeks          Relevant Orders    Follow Up In [facility] - Pharmacy       Alanis Stock, PharmD    Continue all meds under the continuation of care with the referring provider and clinical pharmacy team.

## 2023-11-27 ENCOUNTER — TELEMEDICINE (OUTPATIENT)
Dept: PHARMACY | Facility: HOSPITAL | Age: 73
End: 2023-11-27
Payer: MEDICARE

## 2023-11-27 DIAGNOSIS — J45.909 ASTHMA, UNSPECIFIED ASTHMA SEVERITY, UNSPECIFIED WHETHER COMPLICATED, UNSPECIFIED WHETHER PERSISTENT (HHS-HCC): ICD-10-CM

## 2023-11-27 DIAGNOSIS — E11.69 TYPE 2 DIABETES MELLITUS WITH OTHER SPECIFIED COMPLICATION, WITH LONG-TERM CURRENT USE OF INSULIN (MULTI): ICD-10-CM

## 2023-11-27 DIAGNOSIS — Z79.4 TYPE 2 DIABETES MELLITUS WITH OTHER SPECIFIED COMPLICATION, WITH LONG-TERM CURRENT USE OF INSULIN (MULTI): ICD-10-CM

## 2023-11-27 RX ORDER — SALMETEROL XINAFOATE 50 UG/1
1 POWDER, METERED ORAL; RESPIRATORY (INHALATION) EVERY 12 HOURS
Qty: 1 EACH | Refills: 1 | Status: SHIPPED | OUTPATIENT
Start: 2023-11-27 | End: 2024-01-24 | Stop reason: SDUPTHER

## 2023-11-27 NOTE — PROGRESS NOTES
Subjective   Patient ID: Shanika Knowles is a 73 y.o. female who presents for Diabetes.    Referring Provider: Sangita Telles DO     Diabetes  She presents for her follow-up diabetic visit. She has type 2 diabetes mellitus. Her disease course has been worsening. (Patient states she has multiple low blood sugars in the 60's throughout the week.  ) Hypoglycemia complications include required assistance (Patient drinks a coke to help with hypoglycemic episodes). Her weight is increasing steadily. She is following a generally unhealthy diet. Her home blood glucose trend is fluctuating dramatically.     Patient does report symptoms of hypoglycemia.   - Patient has reported low in  60-70 and has made her feel bad. She has take a 4oz coke or 1 tablespoon of honey to increase her blood sugar when this happens. She states it happen in the evening and has been happening a few times each week.    Patient does not report symptoms of hyperglycemia.     Review of Systems    Objective     There were no vitals taken for this visit.     Labs  Lab Results   Component Value Date    BILITOT 1.6 (H) 09/25/2023    CALCIUM 9.7 09/25/2023    CO2 23 09/25/2023     09/25/2023    CREATININE 1.06 (H) 09/25/2023    GLUCOSE 232 (H) 09/25/2023    ALKPHOS 74 09/25/2023    K 4.2 09/25/2023    PROT 7.2 09/25/2023     09/25/2023    AST 33 09/25/2023    ALT 27 09/25/2023    BUN 17 09/25/2023    ANIONGAP 16 09/25/2023    ALBUMIN 4.2 09/25/2023    GFRF 55 (A) 09/25/2023     Lab Results   Component Value Date    TRIG 153 (H) 09/25/2023    CHOL 212 (H) 09/25/2023    HDL 57.9 09/25/2023     Lab Results   Component Value Date    HGBA1C 9.0 (A) 09/25/2023       Current Outpatient Medications on File Prior to Visit   Medication Sig Dispense Refill    albuterol 2.5 mg /3 mL (0.083 %) nebulizer solution Take 3 mL (2.5 mg) by nebulization every 6 hours if needed for wheezing. 75 mL 0    albuterol 90 mcg/actuation inhaler Inhale 1-2 puffs every 4  "hours if needed.      aspirin 81 mg chewable tablet once every 24 hours.      HumaLOG KwikPen Insulin 200 unit/mL (3 mL) insulin pen pen ADMINISTER 18 UNITS UNDER THE SKIN THREE TIMES DAILY 9 mL 3    insulin detemir (Levemir FlexPen) 100 unit/mL (3 mL) pen Inject 50 units in the morning 45 mL 3    lancets (OneTouch Delica Lancets) 33 gauge misc Use to test blood glucose three times daily 300 each 3    losartan (Cozaar) 25 mg tablet Take 1 tablet (25 mg) by mouth once daily. 90 tablet 1    montelukast (Singulair) 10 mg tablet Take 1 tablet (10 mg) by mouth once daily at bedtime. 90 tablet 1    OneTouch Ultra Test strip Use to test blood glucose three times daily 300 strip 3    pen needle, diabetic (BD Melvi 2nd Gen Pen Needle) 32 gauge x 5/32\" needle 1 Needle 4 times a day. 400 each 1    Synthroid 112 mcg tablet Take 1 tablet (112 mcg) by mouth once daily. 90 tablet 3    [DISCONTINUED] salmeterol (Serevent Diskus) 50 mcg/dose diskus inhaler Inhale 1 puff every 12 hours. 1 each 1     No current facility-administered medications on file prior to visit.      CURRENT PHARMACOTHERAPY  - Humalog 200u/mL 18u tid w meals  - Levemir 100u/mL 50 units in the morning (patient experiences muscle pain and joint pain at higher doses)    HISTORICAL PHARMACOTHERAPY  - Ozempic caused joint and muscle pain  - Jardiance caused decrease in kidney function  - Metformin caused severe migraines  - Januvia caused headaches   - Pioglitazone caused pain  - exenatide cause headaches    Assessment/Plan   Problem List Items Addressed This Visit       Type 2 diabetes mellitus, with long-term current use of insulin (CMS/[facility])     Patient currently uncontrolled with A1c of 9% (goal of <8%).  Patient has tried multiple medications in the past but were discontinued due to side effects.  Discussed trying Trulicity ($50 for a months supply) however patient did not want to start medication at this time until dinner time hypoglycemia is under control.  " Patient was able to receive Freestyle Soha 2 sensors from a medical supply company and has been using them since last visit.  Since the last appointment patient has reported no hypoglycemia at dinner time but did have 1 event overnight.  Discussed that Levemir will no longer be available in January and will have to switch to a different long acting insulin.     PLAN:  - Continue Humalog to 16 units with dinner.    - Continue Levemir 50 units in the morning and Humalog 18 units with breakfast and lunch.    - Continue to test blood sugar levels and watch for signs/symptoms of hypoglycemia.  Instructed patient to call if having hypoglycemic events.  Patient verbalized understanding.    - [facility] will look into coverage of other long acting insulins to switch over at next visit.    - Patient is switching all of her prescriptions over to a new pharmacy but requested new prescriptions sent over because [facility] will not transfer them over.      Follow up: 2 weeks             Alanis Stock, PharmD    Continue all meds under the continuation of care with the referring provider and clinical pharmacy team.

## 2023-11-27 NOTE — ASSESSMENT & PLAN NOTE
Patient currently uncontrolled with A1c of 9% (goal of <8%).  Patient has tried multiple medications in the past but were discontinued due to side effects.  Discussed trying Trulicity ($50 for a months supply) however patient did not want to start medication at this time until dinner time hypoglycemia is under control.  Patient was able to receive Freestyle Soha 2 sensors from a medical supply company and has been using them since last visit.  Since the last appointment patient has reported no hypoglycemia at dinner time but did have 1 event overnight.  Discussed that Levemir will no longer be available in January and will have to switch to a different long acting insulin.     PLAN:  - Continue Humalog to 16 units with dinner.    - Continue Levemir 50 units in the morning and Humalog 18 units with breakfast and lunch.    - Continue to test blood sugar levels and watch for signs/symptoms of hypoglycemia.  Instructed patient to call if having hypoglycemic events.  Patient verbalized understanding.    - [facility] will look into coverage of other long acting insulins to switch over at next visit.    - Patient is switching all of her prescriptions over to a new pharmacy but requested new prescriptions sent over because [facility] will not transfer them over.      Follow up: 2 weeks    Patient tolerated procedure well.

## 2023-12-11 ENCOUNTER — TELEMEDICINE (OUTPATIENT)
Dept: PHARMACY | Facility: HOSPITAL | Age: 73
End: 2023-12-11
Payer: MEDICARE

## 2023-12-11 DIAGNOSIS — E11.69 TYPE 2 DIABETES MELLITUS WITH OTHER SPECIFIED COMPLICATION, WITH LONG-TERM CURRENT USE OF INSULIN (MULTI): ICD-10-CM

## 2023-12-11 DIAGNOSIS — Z79.4 TYPE 2 DIABETES MELLITUS WITH OTHER SPECIFIED COMPLICATION, WITH LONG-TERM CURRENT USE OF INSULIN (MULTI): ICD-10-CM

## 2023-12-11 NOTE — ASSESSMENT & PLAN NOTE
Patient currently uncontrolled with A1c of 9% (goal of <8%).   Discussed trying Trulicity ($50 for a months supply) however patient did not want to start medication at this time.  Patient was able to receive Freestyle Soha 2 sensors from a medical supply company and has been using them since last visit.  Since the last appointment patient has reported no hypoglycemia.  Discussed that Levemir will no longer be available in January and will have to switch to a different long acting insulin.     PLAN:  - Continue Humalog 14 units with dinner  - Continue Levemir 50 units in the morning and Humalog 16 units with breakfast and lunch.    - Continue to test blood sugar levels and watch for signs/symptoms of hypoglycemia.  Instructed patient to call if having hypoglycemic events.  Patient verbalized understanding.      Follow up: 2 weeks

## 2023-12-11 NOTE — PROGRESS NOTES
Subjective   Patient ID: Shanika Knowles is a 73 y.o. female who presents for Diabetes.    Referring Provider: Sangita Telles DO     Diabetes  She presents for her follow-up diabetic visit. She has type 2 diabetes mellitus. Her disease course has been worsening. (Patient states she has multiple low blood sugars in the 60's throughout the week.  ) Hypoglycemia complications include required assistance (Patient drinks a coke to help with hypoglycemic episodes). Her weight is increasing steadily. She is following a generally unhealthy diet. Her home blood glucose trend is fluctuating dramatically.     In the new year Levemir will no longer be available and patient will have to switch to Lantus.  At this time patient has a few boxes left of Levemir and will finish supply before transitioning.      Patient does report symptoms of hypoglycemia.   - Patient has reported low in  60-70 and has made her feel bad. She has take a 4oz coke or 1 tablespoon of honey to increase her blood sugar when this happens. She states it happen in the evening and has been happening a few times each week.    Patient does not report symptoms of hyperglycemia.     Review of Systems    Objective     There were no vitals taken for this visit.     Labs  Lab Results   Component Value Date    BILITOT 1.6 (H) 09/25/2023    CALCIUM 9.7 09/25/2023    CO2 23 09/25/2023     09/25/2023    CREATININE 1.06 (H) 09/25/2023    GLUCOSE 232 (H) 09/25/2023    ALKPHOS 74 09/25/2023    K 4.2 09/25/2023    PROT 7.2 09/25/2023     09/25/2023    AST 33 09/25/2023    ALT 27 09/25/2023    BUN 17 09/25/2023    ANIONGAP 16 09/25/2023    ALBUMIN 4.2 09/25/2023    GFRF 55 (A) 09/25/2023     Lab Results   Component Value Date    TRIG 153 (H) 09/25/2023    CHOL 212 (H) 09/25/2023    HDL 57.9 09/25/2023     Lab Results   Component Value Date    HGBA1C 9.0 (A) 09/25/2023       Current Outpatient Medications on File Prior to Visit   Medication Sig Dispense Refill  "   albuterol 2.5 mg /3 mL (0.083 %) nebulizer solution Take 3 mL (2.5 mg) by nebulization every 6 hours if needed for wheezing. 75 mL 0    albuterol 90 mcg/actuation inhaler Inhale 1-2 puffs every 4 hours if needed.      aspirin 81 mg chewable tablet once every 24 hours.      HumaLOG KwikPen Insulin 200 unit/mL (3 mL) insulin pen pen ADMINISTER 18 UNITS UNDER THE SKIN THREE TIMES DAILY 9 mL 3    insulin detemir (Levemir FlexPen) 100 unit/mL (3 mL) pen Inject 50 units in the morning 45 mL 3    lancets (OneTouch Delica Lancets) 33 gauge misc Use to test blood glucose three times daily 300 each 3    losartan (Cozaar) 25 mg tablet Take 1 tablet (25 mg) by mouth once daily. 90 tablet 1    montelukast (Singulair) 10 mg tablet Take 1 tablet (10 mg) by mouth once daily at bedtime. 90 tablet 1    OneTouch Ultra Test strip Use to test blood glucose three times daily 300 strip 3    pen needle, diabetic (BD Melvi 2nd Gen Pen Needle) 32 gauge x 5/32\" needle 1 Needle 4 times a day. 400 each 1    salmeterol (Serevent Diskus) 50 mcg/dose diskus inhaler Inhale 1 puff every 12 hours. 1 each 1    Synthroid 112 mcg tablet Take 1 tablet (112 mcg) by mouth once daily. 90 tablet 3     No current facility-administered medications on file prior to visit.      CURRENT PHARMACOTHERAPY  - Humalog 200u/mL 18u tid w meals  - Levemir 100u/mL 50 units in the morning (patient experiences muscle pain and joint pain at higher doses)    HISTORICAL PHARMACOTHERAPY  - Ozempic caused joint and muscle pain  - Jardiance caused decrease in kidney function  - Metformin caused severe migraines  - Januvia caused headaches   - Pioglitazone caused pain  - exenatide cause headaches    Assessment/Plan   Problem List Items Addressed This Visit       Type 2 diabetes mellitus, with long-term current use of insulin (CMS/[facility])     Patient currently uncontrolled with A1c of 9% (goal of <8%).   Discussed trying Trulicity ($50 for a months supply) however patient did not " want to start medication at this time.  Patient was able to receive Freestyle Soha 2 sensors from a medical supply company and has been using them since last visit.  Since the last appointment patient has reported no hypoglycemia.  Discussed that Levemir will no longer be available in January and will have to switch to a different long acting insulin.     PLAN:  - Continue Humalog 14 units with dinner  - Continue Levemir 50 units in the morning and Humalog 16 units with breakfast and lunch.    - Continue to test blood sugar levels and watch for signs/symptoms of hypoglycemia.  Instructed patient to call if having hypoglycemic events.  Patient verbalized understanding.      Follow up: 2 weeks          Relevant Orders    Follow Up In [facility] Pharmacy       Alanis Stock, PharmD    Continue all meds under the continuation of care with the referring provider and clinical pharmacy team.

## 2023-12-26 ENCOUNTER — TELEMEDICINE (OUTPATIENT)
Dept: PHARMACY | Facility: HOSPITAL | Age: 73
End: 2023-12-26
Payer: MEDICARE

## 2023-12-26 DIAGNOSIS — Z79.4 TYPE 2 DIABETES MELLITUS WITH OTHER SPECIFIED COMPLICATION, WITH LONG-TERM CURRENT USE OF INSULIN (MULTI): ICD-10-CM

## 2023-12-26 DIAGNOSIS — E11.21 TYPE 2 DIABETES MELLITUS WITH DIABETIC NEPHROPATHY, WITH LONG-TERM CURRENT USE OF INSULIN (MULTI): ICD-10-CM

## 2023-12-26 DIAGNOSIS — Z79.4 TYPE 2 DIABETES MELLITUS WITH DIABETIC NEPHROPATHY, WITH LONG-TERM CURRENT USE OF INSULIN (MULTI): ICD-10-CM

## 2023-12-26 DIAGNOSIS — E11.69 TYPE 2 DIABETES MELLITUS WITH OTHER SPECIFIED COMPLICATION, WITH LONG-TERM CURRENT USE OF INSULIN (MULTI): ICD-10-CM

## 2023-12-26 RX ORDER — INSULIN DETEMIR 100 [IU]/ML
INJECTION, SOLUTION SUBCUTANEOUS
Qty: 45 ML | Refills: 0 | Status: SHIPPED | OUTPATIENT
Start: 2023-12-26 | End: 2024-02-27 | Stop reason: SDUPTHER

## 2023-12-26 RX ORDER — INSULIN LISPRO 200 [IU]/ML
INJECTION, SOLUTION SUBCUTANEOUS
Qty: 9 ML | Refills: 3 | Status: SHIPPED | OUTPATIENT
Start: 2023-12-26 | End: 2024-02-27 | Stop reason: SDUPTHER

## 2023-12-26 NOTE — PROGRESS NOTES
Subjective   Patient ID: Shanika Knowles is a 73 y.o. female who presents for Diabetes.    Referring Provider: Sangita Telles DO     Diabetes  She presents for her follow-up diabetic visit. She has type 2 diabetes mellitus. Her disease course has been worsening. There are no hypoglycemic associated symptoms. There are no hypoglycemic complications. Required assistance: Patient drinks a coke to help with hypoglycemic episodes. Her weight is increasing steadily. She is following a generally unhealthy diet. Her home blood glucose trend is fluctuating dramatically.     In the new year Levemir will no longer be available and patient will have to switch to Lantus.  At this time patient has a few boxes left of Levemir and will finish supply before transitioning.      Review of Systems    Objective     There were no vitals taken for this visit.     Labs  Lab Results   Component Value Date    BILITOT 1.6 (H) 09/25/2023    CALCIUM 9.7 09/25/2023    CO2 23 09/25/2023     09/25/2023    CREATININE 1.06 (H) 09/25/2023    GLUCOSE 232 (H) 09/25/2023    ALKPHOS 74 09/25/2023    K 4.2 09/25/2023    PROT 7.2 09/25/2023     09/25/2023    AST 33 09/25/2023    ALT 27 09/25/2023    BUN 17 09/25/2023    ANIONGAP 16 09/25/2023    ALBUMIN 4.2 09/25/2023    GFRF 55 (A) 09/25/2023     Lab Results   Component Value Date    TRIG 153 (H) 09/25/2023    CHOL 212 (H) 09/25/2023    HDL 57.9 09/25/2023     Lab Results   Component Value Date    HGBA1C 9.0 (A) 09/25/2023       Current Outpatient Medications on File Prior to Visit   Medication Sig Dispense Refill    albuterol 2.5 mg /3 mL (0.083 %) nebulizer solution Take 3 mL (2.5 mg) by nebulization every 6 hours if needed for wheezing. 75 mL 0    albuterol 90 mcg/actuation inhaler Inhale 1-2 puffs every 4 hours if needed.      aspirin 81 mg chewable tablet once every 24 hours.      HumaLOG KwikPen Insulin 200 unit/mL (3 mL) insulin pen pen ADMINISTER 18 UNITS UNDER THE SKIN THREE TIMES  "DAILY 9 mL 3    insulin detemir (Levemir FlexPen) 100 unit/mL (3 mL) pen Inject 50 units in the morning 45 mL 3    lancets (OneTouch Delica Lancets) 33 gauge misc Use to test blood glucose three times daily 300 each 3    losartan (Cozaar) 25 mg tablet Take 1 tablet (25 mg) by mouth once daily. 90 tablet 1    montelukast (Singulair) 10 mg tablet Take 1 tablet (10 mg) by mouth once daily at bedtime. 90 tablet 1    OneTouch Ultra Test strip Use to test blood glucose three times daily 300 strip 3    pen needle, diabetic (BD Melvi 2nd Gen Pen Needle) 32 gauge x 5/32\" needle 1 Needle 4 times a day. 400 each 1    salmeterol (Serevent Diskus) 50 mcg/dose diskus inhaler Inhale 1 puff every 12 hours. 1 each 1    Synthroid 112 mcg tablet Take 1 tablet (112 mcg) by mouth once daily. 90 tablet 3     No current facility-administered medications on file prior to visit.      CURRENT PHARMACOTHERAPY  - Humalog 200u/mL 18u tid w meals  - Levemir 100u/mL 50 units in the morning (patient experiences muscle pain and joint pain at higher doses)    HISTORICAL PHARMACOTHERAPY  - Ozempic caused joint and muscle pain  - Jardiance caused decrease in kidney function  - Metformin caused severe migraines  - Januvia caused headaches   - Pioglitazone caused pain  - exenatide cause headaches    Assessment/Plan   Problem List Items Addressed This Visit       Type 2 diabetes mellitus, with long-term current use of insulin (CMS/[facility])     Patient currently uncontrolled with A1c of 9% (goal of <8%).   Discussed trying Trulicity ($50 for a months supply) however patient did not want to start medication at this time.  Patient was able to receive Freestyle Soha 2 sensors from a medical supply company and has been using them since last visit.  Since the last appointment patient has reported no hypoglycemia.  Patient started using levemir at night and states that it has been helping her blood sugar.  Patient also started to eat dinner later so her bolus " insulin doses are more spread out.  Since numbers are still elevated discussed increasing insulin doses however patient would like to wait until new A1C level at PCP appointment on 12/28.      PLAN:  - Continue Humalog 14 units with dinner  - Continue Levemir 50 units in the evening   - Continue Humalog 16 units with breakfast and lunch.    - Continue to test blood sugar levels and watch for signs/symptoms of hypoglycemia.  Instructed patient to call if having hypoglycemic events.  Patient verbalized understanding.      Follow up: 2 weeks             Alanis Stock, PharmD    Continue all meds under the continuation of care with the referring provider and clinical pharmacy team.

## 2023-12-26 NOTE — ASSESSMENT & PLAN NOTE
Patient currently uncontrolled with A1c of 9% (goal of <8%).   Discussed trying Trulicity ($50 for a months supply) however patient did not want to start medication at this time.  Patient was able to receive Freestyle Soha 2 sensors from a medical supply company and has been using them since last visit.  Since the last appointment patient has reported no hypoglycemia.  Patient started using levemir at night and states that it has been helping her blood sugar.  Patient also started to eat dinner later so her bolus insulin doses are more spread out.  Since numbers are still elevated discussed increasing insulin doses however patient would like to wait until new A1C level at PCP appointment on 12/28.      PLAN:  - Continue Humalog 14 units with dinner  - Continue Levemir 50 units in the evening   - Continue Humalog 16 units with breakfast and lunch.    - Continue to test blood sugar levels and watch for signs/symptoms of hypoglycemia.  Instructed patient to call if having hypoglycemic events.  Patient verbalized understanding.      Follow up: 2 weeks

## 2023-12-27 PROBLEM — Z00.00 MEDICARE ANNUAL WELLNESS VISIT, SUBSEQUENT: Status: ACTIVE | Noted: 2023-12-27

## 2023-12-27 NOTE — PROGRESS NOTES
Subjective   Reason for Visit: Shanika Knowles is an 73 y.o. female here for a Medicare Wellness visit.     Past Medical, Surgical, and Family History reviewed and updated in chart.    Reviewed all medications by prescribing practitioner or clinical pharmacist (such as prescriptions, OTCs, herbal therapies and supplements) and documented in the medical record.    HPI    Patient presents today for routine FUV + MWV.    Patient repots she is some back pain, believes she hurt her back with lifting weights. She is now having muscle spasms and would like to discuss the possibility of a muscle relaxer. No trauma or injury. No saddle anaesthesia, no urinary or bowel incontinence. Overall feels like it is improving.    MEDICARE WELLNESS VISIT   TDAP: 6/2023  SHINGRIX: none found  PNEUMOVAX: completed  PAP: 11/2018 ASCUS negative - graduated   MAMMO: 10/2023  CSCOPE: 5/2021 (tubular adenoma x 2 + sessile serrated x 1 + normal mucosa + good prep) (due 2024)   DEXA: 10/2021 (osteopenia 2/3) (due 10/2023)   HEP C SCREEN: none found  CACS: 205.36 in 5/2021    Patient has already had her influenza vaccine.  Patient declines Shingrix vaccine.    Alcohol use: none  Smoking: non-smoker    Cervical cancer screening: GRADUATED    Breast cancer screening: UTD  Denies family history in first degree relative.  Denies lumps/bumps, skin changes, nipple retraction, or nipple drainage.    Bone density screening: UTD  Denies family history in first degree relative.  Patient is/is not supplementing calcium or vitamin D.    Colon cancer screening: DUE 5/2024  Denies family history in first degree relative.  Denies melena, hematochezia, constipation, diarrhea, bloating, change in bowel habits.    ROUTINE VISIT  CHRONIC CONDITIONS:     -DM2  Co-managed with [facility] pharmacy, plan per last visit 12/26/2023:  Patient currently uncontrolled with A1c of 9% (goal of <8%).   Discussed trying Trulicity ($50 for a months supply) however patient did not want to  start medication at this time.  Patient was able to receive Freestyle Soha 2 sensors from a medical supply company and has been using them since last visit.  Since the last appointment patient has reported no hypoglycemia.  Patient started using levemir at night and states that it has been helping her blood sugar.  Patient also started to eat dinner later so her bolus insulin doses are more spread out.  Since numbers are still elevated discussed increasing insulin doses however patient would like to wait until new A1C level at PCP appointment on 12/28.       PLAN:  - Continue Humalog 14 units with dinner  - Continue Levemir 50 units in the evening   - Continue Humalog 16 units with breakfast and lunch.    - Continue to test blood sugar levels and watch for signs/symptoms of hypoglycemia.  Instructed patient to call if having hypoglycemic events.  Patient verbalized understanding.       Follow up: 2 weeks      Hgb A1c: 9.2 in 12/2023, 9.0 in 9/2023, 8.8 in 6/2023, 9.9 in 3/2023, 7.9 in 9/2022  Albumin: negative 9/2023  Foot exam: 9/2023 - also sees podiatry  Eye exam: 11/2022 - patient reports done in June 2023     Current regimen:  Humalog 12-18u tid w meals  Levemir 50u AM only     Prior medications:   Metformin , unable to tolerate 2/2 hives and migraines.  Pioglitazone, unable to tolerate 2/2 hives and migraines.  Januvia, , unable to tolerate 2/2 migraines.  Jardiance, unable to tolerate 2/2 side effects & decrease in kidney fxn.  Ozempic, unable to tolerate 2/2 due to joint and muscle pain.    Patient states she is doing well in regards to life efforts but despite this A1c is not improving. She has previously been disinclined to start GLP-1.    -HYPOTHYROIDISM, acquired  CURRENT DOSE: Synthroid 112 mcg daily.   6/2023 TSH was normal.    Medication is being taken as directed and is well-tolerated.   Patient denies heat or cold intolerance, diarrhea or constipation, coarsening of hair, and palpitations.  "    -HTN  Taking Losartan 25 mg daily.     Medications are being taken and tolerated well.   No side effects are reported.  Patient is taking blood pressure outside of office and reports good control.  Patient denies chest pain, shortness of breath with exertion, palpitations, lower extremity edema, headache, or dizziness.     -HLD/CAD  Lifestyle managed, does take baby aspirin every other day.  Patient previously disinclined to start statin therapy due to possible side effects    5/2021 CACS was 205.36.  1/2022 TC/HDL ratio 4.2 ()  9/2023 TC/HDL ratio 3.7 ().    -Hx of decreased kidney function  She is s/p nephrology evaluation with Dr. Ferrari, was told everything is normal.  GFR 65 at that time, was told does not have CKD III  Told to come back as needed.      Patient Care Team:  Sangita Telles DO as PCP - General  Sangita Telles DO as PCP - [facility] ACO Attributed Provider     Review of Systems   All other systems reviewed and are negative.      Objective   Vitals:  /74 (BP Location: Left arm, Patient Position: Sitting, BP Cuff Size: Large adult)   Pulse 79   Temp 36.7 °C (98 °F) (Temporal)   Resp 16   Ht 1.676 m (5' 6\")   Wt 99.7 kg (219 lb 12.8 oz)   SpO2 96%   BMI 35.48 kg/m²       Physical Exam  Vitals and nursing note reviewed.   Constitutional:       General: She is not in acute distress.     Appearance: Normal appearance. She is not toxic-appearing.   HENT:      Head: Normocephalic and atraumatic.   Cardiovascular:      Rate and Rhythm: Normal rate and regular rhythm.      Heart sounds: No murmur heard.     No friction rub. No gallop.   Pulmonary:      Effort: Pulmonary effort is normal.      Breath sounds: Normal breath sounds. No wheezing, rhonchi or rales.   Musculoskeletal:      Right lower leg: No edema.      Left lower leg: No edema.   Feet:      Right foot:      Toenail Condition: Right toenails are abnormally thick.      Left foot:      Toenail Condition: Left " toenails are abnormally thick.      Comments: Diabetic Foot Exam:  Right Foot Findings: Normal visual exam. Toes were normal. No skin breakdown.  Left Foot Findings: Normal visual exam. Toes were normal. No skin breakdown.  Monofilament Testing: Normal tactile sensation with monofilament testing throughout both feet.  Neurological:      General: No focal deficit present.      Mental Status: She is alert and oriented to person, place, and time.   Psychiatric:         Mood and Affect: Mood normal.         Behavior: Behavior normal.         Assessment/Plan   Problem List Items Addressed This Visit       Acquired hypothyroidism    Overview     CURRENT DOSE: Synthroid 112 mcg daily.   6/2023 TSH was normal.         Current Assessment & Plan     CURRENT DOSE: Synthroid 112 mcg daily.   6/2023 TSH was normal, to be checked annually, [facility].         Essential hypertension    Overview     Taking Losartan 25 mg daily.         Current Assessment & Plan     BP is 128/74 in office today, good control.  Goal BP is 130/80 or less, ideally 120/80 or less.   Continue Losartan 25 mg daily.  Continue with home monitoring.         Class 2 obesity with body mass index (BMI) of 35.0 to 35.9 in adult    Type 2 diabetes mellitus, with long-term current use of insulin (CMS/[facility])    Overview     Co-managed with [facility] pharmacy    Hgb A1c: 9.2 in 12/2023, 9.0 in 9/2023, 8.8 in 6/2023, 9.9 in 3/2023, 7.9 in 9/2022  Albumin: negative 9/2023  Foot exam: 12/2023  via PCP - also sees podiatry  Eye exam: 11/2022 - patient reports done in June 2023     Current regimen:  Humalog - adjusting with clin pharm -    tid dosing ( 14 - 16 see their notes)   Levemir 50u AM only     Prior medications:   Metformin , unable to tolerate 2/2 hives and migraines.  Pioglitazone, unable to tolerate 2/2 hives and migraines.  Januvia, , unable to tolerate 2/2 migraines.  Jardiance, unable to tolerate 2/2 side effects & decrease in kidney fxn.  Ozempic, unable to tolerate 2/2  due to joint and muscle pain.         Current Assessment & Plan     Hgb A1c: 9.2 in 12/2023, 9.0 in 9/2023, 8.8 in 6/2023, 9.9 in 3/2023, 7.9 in 9/2022  Albumin: negative 9/2023    Current regimen:  Humalog 12-18u tid w meals  Levemir 50u AM only - states unable to sleep if takes this at night.    Co-managed with [facility] pharmacy, I strongly endorse the plan to start GLP-1 injectable (Trulicity, Ozempic, etc) and if A1c trends in right direction can titrate down on the insulin.    Patient to continue current medications and follow-up with the clinical pharmacists as they have recommended.    Keep up the good work with nutritional and exercise efforts.         Relevant Orders    POCT glycosylated hemoglobin (Hb A1C) manually resulted (Completed)    Medicare annual wellness visit, subsequent - Primary    Overview     TDAP: 6/2023  SHINGRIX: none found  PNEUMOVAX: completed  PAP: 11/2018 ASCUS negative - graduated   MAMMO: 10/2023  CSCOPE: 5/2021 (tubular adenoma x 2 + sessile serrated x 1 + normal mucosa + good prep) (due 2024)   DEXA: 10/2021 (osteopenia 2/3) (due 10/2023)   HEP C SCREEN: none found  CACS: 205.36 in 5/2021         Current Assessment & Plan     Vaccines and screenings reviewed.  Questionnaires completed.  Health and wellness topics reviewed.  Diet and exercise recommendations revisited.  Routine blood work ordered today.     VACCINES:  -Flu vaccine is UTD fall 2023  -TDAP is good until 2033  -Pneumonia vaccines previously completed  -Shingles vaccine (Shingrix) is strongly encouraged.  This should be covered starting January 2023 with Medicare per Executive Order.  This is to be administered in 2 separate doses, 2- 6 months apart.   This is a killed virus vaccine, so no risk of chicken pox or shingles with administration.   The vaccine is approximately 90 % effective to protect against shingles even 5 years out.   Side effects of the vaccine can include soreness of the arm at administration site and  possible flu-like symptoms after administration.     SCREENINGS:  -Screening mammogram is UTD, completed 10/2023, to be done annually  -Screening colonoscopy last completed 2021, due for repeat 2024  -Screening DEXA is DUE, ordered today    LIFESTYLE MEASURES  -make sure you are avoiding refined carbs such as breads, pasta, cereal, candy, soda,  nutrition bars, granola, chips, and sugar sweetened beverages.      -eat 5- 7 servings daily of veggies,  healthy protein such as chicken, fish,  beans, and eggs, and include healthy fats in your diet such as seeds, nuts, olive oil, avocados, and salmon.   -exercise 4 - 6 days per week as you are able, 150 minutes total weekly divided up is recommended.  -Vitamin D is recommended at 1000 - 5000 IU international units daily.   -Always wear sunscreen when you have sun exposure.  -64 oz of water is recommended daily.  -Dental visits recommended every 6 months.  -Eye exam recommended every 2 years, for those with vision problems every year.            Other Visit Diagnoses       Encounter for screening for other disorder        Postmenopausal estrogen deficiency        Relevant Orders    XR DEXA bone density            Follow-up in 3 months for recheck DM.  IO A1c upon rooming   Call for sooner follow-up if needed.     Scribe Attestation  By signing my name below, IAvril Scribe   attest that this documentation has been prepared under the direction and in the presence of Sangita Telles DO.

## 2023-12-28 ENCOUNTER — OFFICE VISIT (OUTPATIENT)
Dept: PRIMARY CARE | Facility: CLINIC | Age: 73
End: 2023-12-28
Payer: MEDICARE

## 2023-12-28 VITALS
HEIGHT: 66 IN | SYSTOLIC BLOOD PRESSURE: 128 MMHG | DIASTOLIC BLOOD PRESSURE: 74 MMHG | TEMPERATURE: 98 F | RESPIRATION RATE: 16 BRPM | BODY MASS INDEX: 35.32 KG/M2 | OXYGEN SATURATION: 96 % | HEART RATE: 79 BPM | WEIGHT: 219.8 LBS

## 2023-12-28 DIAGNOSIS — E11.69 TYPE 2 DIABETES MELLITUS WITH OTHER SPECIFIED COMPLICATION, WITH LONG-TERM CURRENT USE OF INSULIN (MULTI): ICD-10-CM

## 2023-12-28 DIAGNOSIS — E03.9 ACQUIRED HYPOTHYROIDISM: ICD-10-CM

## 2023-12-28 DIAGNOSIS — E66.9 CLASS 2 OBESITY WITH BODY MASS INDEX (BMI) OF 35.0 TO 35.9 IN ADULT, UNSPECIFIED OBESITY TYPE, UNSPECIFIED WHETHER SERIOUS COMORBIDITY PRESENT: ICD-10-CM

## 2023-12-28 DIAGNOSIS — Z79.4 TYPE 2 DIABETES MELLITUS WITH OTHER SPECIFIED COMPLICATION, WITH LONG-TERM CURRENT USE OF INSULIN (MULTI): ICD-10-CM

## 2023-12-28 DIAGNOSIS — Z00.00 MEDICARE ANNUAL WELLNESS VISIT, SUBSEQUENT: Primary | ICD-10-CM

## 2023-12-28 DIAGNOSIS — M62.830 SPASM OF MUSCLE OF LOWER BACK: ICD-10-CM

## 2023-12-28 DIAGNOSIS — Z13.89 ENCOUNTER FOR SCREENING FOR OTHER DISORDER: ICD-10-CM

## 2023-12-28 DIAGNOSIS — Z78.0 POSTMENOPAUSAL ESTROGEN DEFICIENCY: ICD-10-CM

## 2023-12-28 DIAGNOSIS — I10 ESSENTIAL HYPERTENSION: ICD-10-CM

## 2023-12-28 LAB — POC HEMOGLOBIN A1C: 9.2 % (ref 4.2–6.5)

## 2023-12-28 PROCEDURE — 83036 HEMOGLOBIN GLYCOSYLATED A1C: CPT | Performed by: FAMILY MEDICINE

## 2023-12-28 PROCEDURE — 99214 OFFICE O/P EST MOD 30 MIN: CPT | Performed by: FAMILY MEDICINE

## 2023-12-28 PROCEDURE — 1170F FXNL STATUS ASSESSED: CPT | Performed by: FAMILY MEDICINE

## 2023-12-28 PROCEDURE — 3066F NEPHROPATHY DOC TX: CPT | Performed by: FAMILY MEDICINE

## 2023-12-28 PROCEDURE — 3052F HG A1C>EQUAL 8.0%<EQUAL 9.0%: CPT | Performed by: FAMILY MEDICINE

## 2023-12-28 PROCEDURE — 1160F RVW MEDS BY RX/DR IN RCRD: CPT | Performed by: FAMILY MEDICINE

## 2023-12-28 PROCEDURE — 3074F SYST BP LT 130 MM HG: CPT | Performed by: FAMILY MEDICINE

## 2023-12-28 PROCEDURE — 3008F BODY MASS INDEX DOCD: CPT | Performed by: FAMILY MEDICINE

## 2023-12-28 PROCEDURE — 3078F DIAST BP <80 MM HG: CPT | Performed by: FAMILY MEDICINE

## 2023-12-28 PROCEDURE — G0444 DEPRESSION SCREEN ANNUAL: HCPCS | Performed by: FAMILY MEDICINE

## 2023-12-28 PROCEDURE — 1159F MED LIST DOCD IN RCRD: CPT | Performed by: FAMILY MEDICINE

## 2023-12-28 PROCEDURE — G0439 PPPS, SUBSEQ VISIT: HCPCS | Performed by: FAMILY MEDICINE

## 2023-12-28 PROCEDURE — 4010F ACE/ARB THERAPY RXD/TAKEN: CPT | Performed by: FAMILY MEDICINE

## 2023-12-28 PROCEDURE — 1036F TOBACCO NON-USER: CPT | Performed by: FAMILY MEDICINE

## 2023-12-28 ASSESSMENT — ACTIVITIES OF DAILY LIVING (ADL)
GROCERY_SHOPPING: INDEPENDENT
TAKING_MEDICATION: INDEPENDENT
BATHING: INDEPENDENT
DOING_HOUSEWORK: INDEPENDENT
DRESSING: INDEPENDENT
MANAGING_FINANCES: INDEPENDENT

## 2023-12-28 ASSESSMENT — PATIENT HEALTH QUESTIONNAIRE - PHQ9
2. FEELING DOWN, DEPRESSED OR HOPELESS: NOT AT ALL
1. LITTLE INTEREST OR PLEASURE IN DOING THINGS: NOT AT ALL
SUM OF ALL RESPONSES TO PHQ9 QUESTIONS 1 AND 2: 0

## 2023-12-28 ASSESSMENT — LIFESTYLE VARIABLES: HOW OFTEN DO YOU HAVE A DRINK CONTAINING ALCOHOL: NEVER

## 2023-12-28 NOTE — ASSESSMENT & PLAN NOTE
Hgb A1c: 9.2 in 12/2023, 9.0 in 9/2023, 8.8 in 6/2023, 9.9 in 3/2023, 7.9 in 9/2022  Albumin: negative 9/2023    Current regimen:  Humalog 12-18u tid w meals  Levemir 50u AM only - states unable to sleep if takes this at night.    Co-managed with [facility] pharmacy, I strongly endorse the plan to start GLP-1 injectable (Trulicity, Ozempic, etc) and if A1c trends in right direction can titrate down on the insulin.    Patient to continue current medications and follow-up with the clinical pharmacists as they have recommended.    Keep up the good work with nutritional and exercise efforts.

## 2023-12-28 NOTE — ASSESSMENT & PLAN NOTE
Vaccines and screenings reviewed.  Questionnaires completed.  Health and wellness topics reviewed.  Diet and exercise recommendations revisited.  Routine blood work ordered today.     VACCINES:  -Flu vaccine is UTD fall 2023  -TDAP is good until 2033  -Pneumonia vaccines previously completed  -Shingles vaccine (Shingrix) is strongly encouraged.  This should be covered starting January 2023 with Medicare per Executive Order.  This is to be administered in 2 separate doses, 2- 6 months apart.   This is a killed virus vaccine, so no risk of chicken pox or shingles with administration.   The vaccine is approximately 90 % effective to protect against shingles even 5 years out.   Side effects of the vaccine can include soreness of the arm at administration site and possible flu-like symptoms after administration.     SCREENINGS:  -Screening mammogram is UTD, completed 10/2023, to be done annually  -Screening colonoscopy last completed 2021, due for repeat 2024  -Screening DEXA is DUE, ordered today    LIFESTYLE MEASURES  -make sure you are avoiding refined carbs such as breads, pasta, cereal, candy, soda,  nutrition bars, granola, chips, and sugar sweetened beverages.      -eat 5- 7 servings daily of veggies,  healthy protein such as chicken, fish,  beans, and eggs, and include healthy fats in your diet such as seeds, nuts, olive oil, avocados, and salmon.   -exercise 4 - 6 days per week as you are able, 150 minutes total weekly divided up is recommended.  -Vitamin D is recommended at 1000 - 5000 IU international units daily.   -Always wear sunscreen when you have sun exposure.  -64 oz of water is recommended daily.  -Dental visits recommended every 6 months.  -Eye exam recommended every 2 years, for those with vision problems every year.

## 2023-12-28 NOTE — ASSESSMENT & PLAN NOTE
BP is 128/74 in office today, good control.  Goal BP is 130/80 or less, ideally 120/80 or less.   Continue Losartan 25 mg daily.  Continue with home monitoring.

## 2023-12-28 NOTE — ASSESSMENT & PLAN NOTE
CURRENT DOSE: Synthroid 112 mcg daily.   6/2023 TSH was normal, to be checked annually, [facility].

## 2023-12-29 RX ORDER — TIZANIDINE 2 MG/1
2 TABLET ORAL EVERY 6 HOURS PRN
Qty: 10 TABLET | Refills: 1 | Status: SHIPPED | OUTPATIENT
Start: 2023-12-29 | End: 2024-04-04 | Stop reason: SDUPTHER

## 2023-12-29 NOTE — ADDENDUM NOTE
Addended by: PRANAY BROWN on: 12/29/2023 02:12 AM     Modules accepted: Orders     Colchicine Counseling:  Patient counseled regarding adverse effects including but not limited to stomach upset (nausea, vomiting, stomach pain, or diarrhea).  Patient instructed to limit alcohol consumption while taking this medication.  Colchicine may reduce blood counts especially with prolonged use.  The patient understands that monitoring of kidney function and blood counts may be required, especially at baseline. The patient verbalized understanding of the proper use and possible adverse effects of colchicine.  All of the patient's questions and concerns were addressed.

## 2024-01-02 ENCOUNTER — ANCILLARY PROCEDURE (OUTPATIENT)
Dept: RADIOLOGY | Facility: CLINIC | Age: 74
End: 2024-01-02
Payer: MEDICARE

## 2024-01-02 DIAGNOSIS — Z78.0 POSTMENOPAUSAL ESTROGEN DEFICIENCY: ICD-10-CM

## 2024-01-02 PROCEDURE — 77080 DXA BONE DENSITY AXIAL: CPT

## 2024-01-02 PROCEDURE — 77080 DXA BONE DENSITY AXIAL: CPT | Performed by: RADIOLOGY

## 2024-01-09 ENCOUNTER — TELEMEDICINE (OUTPATIENT)
Dept: PHARMACY | Facility: HOSPITAL | Age: 74
End: 2024-01-09
Payer: MEDICARE

## 2024-01-09 DIAGNOSIS — E11.21 TYPE 2 DIABETES MELLITUS WITH DIABETIC NEPHROPATHY, WITH LONG-TERM CURRENT USE OF INSULIN (MULTI): ICD-10-CM

## 2024-01-09 DIAGNOSIS — Z79.4 TYPE 2 DIABETES MELLITUS WITH DIABETIC NEPHROPATHY, WITH LONG-TERM CURRENT USE OF INSULIN (MULTI): ICD-10-CM

## 2024-01-09 RX ORDER — DULAGLUTIDE 0.75 MG/.5ML
0.75 INJECTION, SOLUTION SUBCUTANEOUS
Qty: 2 ML | Refills: 1 | Status: SHIPPED | OUTPATIENT
Start: 2024-01-09 | End: 2024-02-27 | Stop reason: SDUPTHER

## 2024-01-09 NOTE — ASSESSMENT & PLAN NOTE
Patient currently uncontrolled with A1c of 9.2% (goal of <8%).    Patient was able to receive Freestyle Soha 2 sensors from a medical supply company and has been using them since last visit.  Since the last appointment patient has reported no hypoglycemia.  Patient started using levemir at night and states that it has been helping her blood sugar.  Patient also started to eat dinner later so her bolus insulin doses are more spread out.  Discussed trying Trulicity ($50 for a months supply) and patient was agreeable at this time.     Trulicity Education:     - Counseled patient on Trulicity MOA, expectations, side effects, duration of therapy, administration, and monitoring parameters.  - Provided detailed dosing and administration counseling to ensure proper technique.   - Reviewed Trulicity titration schedule, starting with 0.75 mg once weekly for 4 weeks, then increase to 1.5 mg once weekly. Pt verbalized understanding.  - Counseled patient on the benefits of GLP-1ra, such as cardiovascular risk reduction, glycemic control, and weight loss potential.  - Reviewed storage requirements of Trulicity when not in use, and when to administer the medication if a dose is missed.  - Advised patient that they may experience improved satiety after meals and portion sizes of meals may be reduced as doses of Trulicity increase.  - Counseled patient to avoid foods that are fatty/oily as this may precipitate the nausea/GI upset that may occur with new start Trulicity.       PLAN:  - START Trulicity 0.75 mg once weekly injections.  Prescription sent to [facility] per patient request.    - Continue Humalog 14 units with dinner  - Continue Levemir 50 units in the evening   - Continue Humalog 16 units with breakfast and lunch.    - Continue to test blood sugar levels and watch for signs/symptoms of hypoglycemia.  Instructed patient to call if having hypoglycemic events.  Patient verbalized understanding.      Follow up: 2 weeks

## 2024-01-09 NOTE — PROGRESS NOTES
Subjective   Patient ID: Shanika Knowles is a 73 y.o. female who presents for Diabetes.    Referring Provider: Sangita Telles DO     Diabetes  She presents for her follow-up diabetic visit. She has type 2 diabetes mellitus. Her disease course has been worsening. There are no hypoglycemic associated symptoms. There are no hypoglycemic complications. Required assistance: Patient drinks a coke to help with hypoglycemic episodes. Her weight is increasing steadily. She is following a generally unhealthy diet. Her home blood glucose trend is fluctuating dramatically.     In the new year Levemir will no longer be available and patient will have to switch to Lantus.  At this time patient has a few boxes left of Levemir and will finish supply before transitioning.      Review of Systems    Objective     There were no vitals taken for this visit.     Labs  Lab Results   Component Value Date    BILITOT 1.6 (H) 09/25/2023    CALCIUM 9.7 09/25/2023    CO2 23 09/25/2023     09/25/2023    CREATININE 1.06 (H) 09/25/2023    GLUCOSE 232 (H) 09/25/2023    ALKPHOS 74 09/25/2023    K 4.2 09/25/2023    PROT 7.2 09/25/2023     09/25/2023    AST 33 09/25/2023    ALT 27 09/25/2023    BUN 17 09/25/2023    ANIONGAP 16 09/25/2023    ALBUMIN 4.2 09/25/2023    GFRF 55 (A) 09/25/2023     Lab Results   Component Value Date    TRIG 153 (H) 09/25/2023    CHOL 212 (H) 09/25/2023    HDL 57.9 09/25/2023     Lab Results   Component Value Date    HGBA1C 9.2 (A) 12/28/2023       Current Outpatient Medications on File Prior to Visit   Medication Sig Dispense Refill    albuterol 90 mcg/actuation inhaler Inhale 1-2 puffs every 4 hours if needed.      aspirin 81 mg chewable tablet once every 24 hours.      HumaLOG KwikPen Insulin 200 unit/mL (3 mL) insulin pen pen ADMINISTER 18 UNITS UNDER THE SKIN THREE TIMES DAILY 9 mL 3    insulin detemir (Levemir FlexPen) 100 unit/mL (3 mL) pen Inject 50 units in the morning 45 mL 0    lancets (OneTouch  "Delica Lancets) 33 gauge misc Use to test blood glucose three times daily 300 each 3    losartan (Cozaar) 25 mg tablet Take 1 tablet (25 mg) by mouth once daily. 90 tablet 1    montelukast (Singulair) 10 mg tablet Take 1 tablet (10 mg) by mouth once daily at bedtime. 90 tablet 1    OneTouch Ultra Test strip Use to test blood glucose three times daily 300 strip 3    pen needle, diabetic (BD Melvi 2nd Gen Pen Needle) 32 gauge x 5/32\" needle 1 Needle 4 times a day. 400 each 1    salmeterol (Serevent Diskus) 50 mcg/dose diskus inhaler Inhale 1 puff every 12 hours. 1 each 1    Synthroid 112 mcg tablet Take 1 tablet (112 mcg) by mouth once daily. 90 tablet 3    tiZANidine (Zanaflex) 2 mg tablet Take 1 tablet (2 mg) by mouth every 6 hours if needed for muscle spasms for up to 20 doses. 10 tablet 1     No current facility-administered medications on file prior to visit.      CURRENT PHARMACOTHERAPY  - Humalog 200u/mL 18u tid w meals  - Levemir 100u/mL 50 units in the morning (patient experiences muscle pain and joint pain at higher doses)    HISTORICAL PHARMACOTHERAPY  - Ozempic caused joint and muscle pain  - Jardiance caused decrease in kidney function  - Metformin caused severe migraines  - Januvia caused headaches   - Pioglitazone caused pain  - exenatide cause headaches    Assessment/Plan   Problem List Items Addressed This Visit       Type 2 diabetes mellitus, with long-term current use of insulin (CMS/[facility])     Patient currently uncontrolled with A1c of 9.2% (goal of <8%).    Patient was able to receive Freestyle Soha 2 sensors from a medical supply company and has been using them since last visit.  Since the last appointment patient has reported no hypoglycemia.  Patient started using levemir at night and states that it has been helping her blood sugar.  Patient also started to eat dinner later so her bolus insulin doses are more spread out.  Discussed trying Trulicity ($50 for a months supply) and patient was " agreeable at this time.     Trulicity Education:     - Counseled patient on Trulicity MOA, expectations, side effects, duration of therapy, administration, and monitoring parameters.  - Provided detailed dosing and administration counseling to ensure proper technique.   - Reviewed Trulicity titration schedule, starting with 0.75 mg once weekly for 4 weeks, then increase to 1.5 mg once weekly. Pt verbalized understanding.  - Counseled patient on the benefits of GLP-1ra, such as cardiovascular risk reduction, glycemic control, and weight loss potential.  - Reviewed storage requirements of Trulicity when not in use, and when to administer the medication if a dose is missed.  - Advised patient that they may experience improved satiety after meals and portion sizes of meals may be reduced as doses of Trulicity increase.  - Counseled patient to avoid foods that are fatty/oily as this may precipitate the nausea/GI upset that may occur with new start Trulicity.       PLAN:  - START Trulicity 0.75 mg once weekly injections.  Prescription sent to [facility] per patient request.    - Continue Humalog 14 units with dinner  - Continue Levemir 50 units in the evening   - Continue Humalog 16 units with breakfast and lunch.    - Continue to test blood sugar levels and watch for signs/symptoms of hypoglycemia.  Instructed patient to call if having hypoglycemic events.  Patient verbalized understanding.      Follow up: 2 weeks          Relevant Medications    dulaglutide (Trulicity) 0.75 mg/0.5 mL pen injector    Other Relevant Orders    Follow Up In [facility] - Pharmacy       Alanis Stock, PharmD    Continue all meds under the continuation of care with the referring provider and clinical pharmacy team.

## 2024-01-23 ENCOUNTER — TELEMEDICINE (OUTPATIENT)
Dept: PHARMACY | Facility: HOSPITAL | Age: 74
End: 2024-01-23
Payer: MEDICARE

## 2024-01-23 DIAGNOSIS — E11.21 TYPE 2 DIABETES MELLITUS WITH DIABETIC NEPHROPATHY, WITH LONG-TERM CURRENT USE OF INSULIN (MULTI): ICD-10-CM

## 2024-01-23 DIAGNOSIS — Z79.4 TYPE 2 DIABETES MELLITUS WITH DIABETIC NEPHROPATHY, WITH LONG-TERM CURRENT USE OF INSULIN (MULTI): ICD-10-CM

## 2024-01-23 NOTE — ASSESSMENT & PLAN NOTE
Patient currently uncontrolled with A1c of 9.2% (goal of <8%).    Patient was able to receive Freestyle Soha 2 sensors from a medical supply company and has been using them since last visit.  Since the last appointment patient has reported no hypoglycemia.  Patient started using levemir at night and states that it has been helping her blood sugar.  Patient also started to eat dinner later so her bolus insulin doses are more spread out.  Patient started Trulicity and completed one injection so far.  She reports some nausea and upset stomach but will try a few more doses to see if side effect subside.      PLAN:  - Continue Trulicity 0.75 mg once weekly injections.    - Continue Humalog 14 units with dinner  - Continue Levemir 50 units in the evening   - Continue Humalog 16 units with breakfast and lunch.    - Continue to test blood sugar levels and watch for signs/symptoms of hypoglycemia.  Instructed patient to call if having hypoglycemic events.  Patient verbalized understanding.      Follow up: 2 weeks

## 2024-01-23 NOTE — PROGRESS NOTES
Subjective   Patient ID: Shanika Knowles is a 73 y.o. female who presents for Diabetes.    Referring Provider: Sangita Telles DO     Diabetes  She presents for her follow-up diabetic visit. She has type 2 diabetes mellitus. Her disease course has been worsening. There are no hypoglycemic associated symptoms. There are no hypoglycemic complications. Required assistance: Patient drinks a coke to help with hypoglycemic episodes. Her weight is increasing steadily. She is following a generally unhealthy diet. Her home blood glucose trend is fluctuating dramatically.     In the new year Levemir will no longer be available and patient will have to switch to Lantus.  At this time patient has a few boxes left of Levemir and will finish supply before transitioning.      Review of Systems    Objective     There were no vitals taken for this visit.     Labs  Lab Results   Component Value Date    BILITOT 1.6 (H) 09/25/2023    CALCIUM 9.7 09/25/2023    CO2 23 09/25/2023     09/25/2023    CREATININE 1.06 (H) 09/25/2023    GLUCOSE 232 (H) 09/25/2023    ALKPHOS 74 09/25/2023    K 4.2 09/25/2023    PROT 7.2 09/25/2023     09/25/2023    AST 33 09/25/2023    ALT 27 09/25/2023    BUN 17 09/25/2023    ANIONGAP 16 09/25/2023    ALBUMIN 4.2 09/25/2023    GFRF 55 (A) 09/25/2023     Lab Results   Component Value Date    TRIG 153 (H) 09/25/2023    CHOL 212 (H) 09/25/2023    HDL 57.9 09/25/2023     Lab Results   Component Value Date    HGBA1C 9.2 (A) 12/28/2023       Current Outpatient Medications on File Prior to Visit   Medication Sig Dispense Refill    albuterol 90 mcg/actuation inhaler Inhale 1-2 puffs every 4 hours if needed.      aspirin 81 mg chewable tablet once every 24 hours.      dulaglutide (Trulicity) 0.75 mg/0.5 mL pen injector Inject 0.75 mg under the skin 1 (one) time per week. 2 mL 1    HumaLOG KwikPen Insulin 200 unit/mL (3 mL) insulin pen pen ADMINISTER 18 UNITS UNDER THE SKIN THREE TIMES DAILY 9 mL 3     "insulin detemir (Levemir FlexPen) 100 unit/mL (3 mL) pen Inject 50 units in the morning 45 mL 0    lancets (OneTouch Delica Lancets) 33 gauge misc Use to test blood glucose three times daily 300 each 3    losartan (Cozaar) 25 mg tablet Take 1 tablet (25 mg) by mouth once daily. 90 tablet 1    montelukast (Singulair) 10 mg tablet Take 1 tablet (10 mg) by mouth once daily at bedtime. 90 tablet 1    OneTouch Ultra Test strip Use to test blood glucose three times daily 300 strip 3    pen needle, diabetic (BD Melvi 2nd Gen Pen Needle) 32 gauge x 5/32\" needle 1 Needle 4 times a day. 400 each 1    salmeterol (Serevent Diskus) 50 mcg/dose diskus inhaler Inhale 1 puff every 12 hours. 1 each 1    Synthroid 112 mcg tablet Take 1 tablet (112 mcg) by mouth once daily. 90 tablet 3    tiZANidine (Zanaflex) 2 mg tablet Take 1 tablet (2 mg) by mouth every 6 hours if needed for muscle spasms for up to 20 doses. 10 tablet 1     No current facility-administered medications on file prior to visit.      CURRENT PHARMACOTHERAPY  - Humalog 200u/mL 18u tid w meals  - Levemir 100u/mL 50 units in the morning (patient experiences muscle pain and joint pain at higher doses)    HISTORICAL PHARMACOTHERAPY  - Ozempic caused joint and muscle pain  - Jardiance caused decrease in kidney function  - Metformin caused severe migraines  - Januvia caused headaches   - Pioglitazone caused pain  - exenatide cause headaches    Assessment/Plan   Problem List Items Addressed This Visit       Type 2 diabetes mellitus, with long-term current use of insulin (CMS/[facility])     Patient currently uncontrolled with A1c of 9.2% (goal of <8%).    Patient was able to receive Freestyle Soha 2 sensors from a medical supply company and has been using them since last visit.  Since the last appointment patient has reported no hypoglycemia.  Patient started using levemir at night and states that it has been helping her blood sugar.  Patient also started to eat dinner later so her " bolus insulin doses are more spread out.  Patient started Trulicity and completed one injection so far.  She reports some nausea and upset stomach but will try a few more doses to see if side effect subside.      PLAN:  - Continue Trulicity 0.75 mg once weekly injections.    - Continue Humalog 14 units with dinner  - Continue Levemir 50 units in the evening   - Continue Humalog 16 units with breakfast and lunch.    - Continue to test blood sugar levels and watch for signs/symptoms of hypoglycemia.  Instructed patient to call if having hypoglycemic events.  Patient verbalized understanding.      Follow up: 2 weeks             Alanis Stock, PharmD    Continue all meds under the continuation of care with the referring provider and clinical pharmacy team.

## 2024-01-24 DIAGNOSIS — J45.909 ASTHMA, UNSPECIFIED ASTHMA SEVERITY, UNSPECIFIED WHETHER COMPLICATED, UNSPECIFIED WHETHER PERSISTENT (HHS-HCC): ICD-10-CM

## 2024-01-24 RX ORDER — SALMETEROL XINAFOATE 50 UG/1
1 POWDER, METERED ORAL; RESPIRATORY (INHALATION) EVERY 12 HOURS
Qty: 60 EACH | Refills: 2 | Status: SHIPPED | OUTPATIENT
Start: 2024-01-24 | End: 2024-04-04 | Stop reason: SDUPTHER

## 2024-02-06 ENCOUNTER — TELEMEDICINE (OUTPATIENT)
Dept: PHARMACY | Facility: HOSPITAL | Age: 74
End: 2024-02-06
Payer: MEDICARE

## 2024-02-06 DIAGNOSIS — E11.21 TYPE 2 DIABETES MELLITUS WITH DIABETIC NEPHROPATHY, WITH LONG-TERM CURRENT USE OF INSULIN (MULTI): ICD-10-CM

## 2024-02-06 DIAGNOSIS — Z79.4 TYPE 2 DIABETES MELLITUS WITH DIABETIC NEPHROPATHY, WITH LONG-TERM CURRENT USE OF INSULIN (MULTI): ICD-10-CM

## 2024-02-06 NOTE — PROGRESS NOTES
Subjective   Patient ID: Shanika Knowles is a 73 y.o. female who presents for Diabetes.    Referring Provider: Sangita Telles DO     Diabetes  She presents for her follow-up diabetic visit. She has type 2 diabetes mellitus. Her disease course has been worsening. There are no hypoglycemic associated symptoms. There are no hypoglycemic complications. Required assistance: Patient drinks a coke to help with hypoglycemic episodes. Her weight is increasing steadily. She is following a generally unhealthy diet. Her home blood glucose trend is fluctuating dramatically.     In the new year Levemir will no longer be available and patient will have to switch to Lantus.  At this time patient has a few boxes left of Levemir and will finish supply before transitioning.      Review of Systems    Objective     There were no vitals taken for this visit.     Labs  Lab Results   Component Value Date    BILITOT 1.6 (H) 09/25/2023    CALCIUM 9.7 09/25/2023    CO2 23 09/25/2023     09/25/2023    CREATININE 1.06 (H) 09/25/2023    GLUCOSE 232 (H) 09/25/2023    ALKPHOS 74 09/25/2023    K 4.2 09/25/2023    PROT 7.2 09/25/2023     09/25/2023    AST 33 09/25/2023    ALT 27 09/25/2023    BUN 17 09/25/2023    ANIONGAP 16 09/25/2023    ALBUMIN 4.2 09/25/2023    GFRF 55 (A) 09/25/2023     Lab Results   Component Value Date    TRIG 153 (H) 09/25/2023    CHOL 212 (H) 09/25/2023    HDL 57.9 09/25/2023     Lab Results   Component Value Date    HGBA1C 9.2 (A) 12/28/2023       Current Outpatient Medications on File Prior to Visit   Medication Sig Dispense Refill   • albuterol 90 mcg/actuation inhaler Inhale 1-2 puffs every 4 hours if needed.     • aspirin 81 mg chewable tablet once every 24 hours.     • dulaglutide (Trulicity) 0.75 mg/0.5 mL pen injector Inject 0.75 mg under the skin 1 (one) time per week. 2 mL 1   • HumaLOG KwikPen Insulin 200 unit/mL (3 mL) insulin pen pen ADMINISTER 18 UNITS UNDER THE SKIN THREE TIMES DAILY 9 mL 3   •  "insulin detemir (Levemir FlexPen) 100 unit/mL (3 mL) pen Inject 50 units in the morning 45 mL 0   • lancets (OneTouch Delica Lancets) 33 gauge misc Use to test blood glucose three times daily 300 each 3   • losartan (Cozaar) 25 mg tablet Take 1 tablet (25 mg) by mouth once daily. 90 tablet 1   • montelukast (Singulair) 10 mg tablet Take 1 tablet (10 mg) by mouth once daily at bedtime. 90 tablet 1   • OneTouch Ultra Test strip Use to test blood glucose three times daily 300 strip 3   • pen needle, diabetic (BD Melvi 2nd Gen Pen Needle) 32 gauge x 5/32\" needle 1 Needle 4 times a day. 400 each 1   • salmeterol (Serevent Diskus) 50 mcg/dose diskus inhaler Inhale 1 puff every 12 hours. 60 each 2   • Synthroid 112 mcg tablet Take 1 tablet (112 mcg) by mouth once daily. 90 tablet 3   • tiZANidine (Zanaflex) 2 mg tablet Take 1 tablet (2 mg) by mouth every 6 hours if needed for muscle spasms for up to 20 doses. 10 tablet 1     No current facility-administered medications on file prior to visit.      CURRENT PHARMACOTHERAPY  - Humalog 200u/mL 18u tid w meals  - Levemir 100u/mL 50 units in the morning (patient experiences muscle pain and joint pain at higher doses)    HISTORICAL PHARMACOTHERAPY  - Ozempic caused joint and muscle pain  - Jardiance caused decrease in kidney function  - Metformin caused severe migraines  - Januvia caused headaches   - Pioglitazone caused pain  - exenatide cause headaches    Assessment/Plan   Problem List Items Addressed This Visit       Type 2 diabetes mellitus, with long-term current use of insulin (CMS/[facility])     Patient currently uncontrolled with A1c of 9.2% (goal of <8%).    Patient was able to receive Freestyle Soha 2 sensors from a medical supply company and has been using them since last visit.  Since the last appointment patient has reported no hypoglycemia.  Patient started using levemir at night and states that it has been helping her blood sugar.  Patient also started to eat dinner " later so her bolus insulin doses are more spread out.  Patient started Trulicity and completed one injection so far.  She reports some nausea and upset stomach but will try a few more doses to see if side effect subside. Patient was agreeable to stay on Trulicity until next A1C draw to see if medication is helping.       PLAN:  - Continue Trulicity 0.75 mg once weekly injections.    - Continue Humalog 14 units with dinner  - Continue Levemir 50 units in the evening   - Continue Humalog 16 units with breakfast and lunch.    - Continue to test blood sugar levels and watch for signs/symptoms of hypoglycemia.  Instructed patient to call if having hypoglycemic events.  Patient verbalized understanding.      Follow up: 3 weeks          Relevant Orders    Follow Up In [facility]       Alanis Stock, PharmD    Continue all meds under the continuation of care with the referring provider and clinical pharmacy team.

## 2024-02-06 NOTE — ASSESSMENT & PLAN NOTE
Patient currently uncontrolled with A1c of 9.2% (goal of <8%).    Patient was able to receive Freestyle Soha 2 sensors from a medical supply company and has been using them since last visit.  Since the last appointment patient has reported no hypoglycemia.  Patient started using levemir at night and states that it has been helping her blood sugar.  Patient also started to eat dinner later so her bolus insulin doses are more spread out.  Patient started Trulicity and completed one injection so far.  She reports some nausea and upset stomach but will try a few more doses to see if side effect subside. Patient was agreeable to stay on Trulicity until next A1C draw to see if medication is helping.       PLAN:  - Continue Trulicity 0.75 mg once weekly injections.    - Continue Humalog 14 units with dinner  - Continue Levemir 50 units in the evening   - Continue Humalog 16 units with breakfast and lunch.    - Continue to test blood sugar levels and watch for signs/symptoms of hypoglycemia.  Instructed patient to call if having hypoglycemic events.  Patient verbalized understanding.      Follow up: 3 weeks

## 2024-02-27 ENCOUNTER — TELEMEDICINE (OUTPATIENT)
Dept: PHARMACY | Facility: HOSPITAL | Age: 74
End: 2024-02-27
Payer: MEDICARE

## 2024-02-27 DIAGNOSIS — E11.21 TYPE 2 DIABETES MELLITUS WITH DIABETIC NEPHROPATHY, WITH LONG-TERM CURRENT USE OF INSULIN (MULTI): ICD-10-CM

## 2024-02-27 DIAGNOSIS — E11.65 UNCONTROLLED TYPE 2 DIABETES MELLITUS WITH HYPERGLYCEMIA (MULTI): ICD-10-CM

## 2024-02-27 DIAGNOSIS — Z79.4 TYPE 2 DIABETES MELLITUS WITH DIABETIC NEPHROPATHY, WITH LONG-TERM CURRENT USE OF INSULIN (MULTI): ICD-10-CM

## 2024-02-27 RX ORDER — PEN NEEDLE, DIABETIC 30 GX3/16"
1 NEEDLE, DISPOSABLE MISCELLANEOUS 4 TIMES DAILY
Qty: 400 EACH | Refills: 3 | Status: SHIPPED | OUTPATIENT
Start: 2024-02-27

## 2024-02-27 RX ORDER — INSULIN DETEMIR 100 [IU]/ML
INJECTION, SOLUTION SUBCUTANEOUS
Qty: 15 ML | Refills: 0 | Status: SHIPPED | OUTPATIENT
Start: 2024-02-27 | End: 2024-04-04 | Stop reason: SDUPTHER

## 2024-02-27 RX ORDER — DULAGLUTIDE 0.75 MG/.5ML
0.75 INJECTION, SOLUTION SUBCUTANEOUS
Qty: 2 ML | Refills: 1 | Status: SHIPPED | OUTPATIENT
Start: 2024-02-27

## 2024-02-27 RX ORDER — BLOOD SUGAR DIAGNOSTIC
STRIP MISCELLANEOUS
Qty: 300 STRIP | Refills: 3 | Status: SHIPPED | OUTPATIENT
Start: 2024-02-27

## 2024-02-27 RX ORDER — INSULIN LISPRO 200 [IU]/ML
INJECTION, SOLUTION SUBCUTANEOUS
Qty: 9 ML | Refills: 3 | Status: SHIPPED | OUTPATIENT
Start: 2024-02-27 | End: 2024-04-04 | Stop reason: SDUPTHER

## 2024-02-27 NOTE — ASSESSMENT & PLAN NOTE
Patient currently uncontrolled with A1c of 9.2% (goal of <8%).    Patient was able to receive Freestyle Soha 2 sensors from a medical supply company and has been using them since last visit.  Since the last appointment patient has reported no hypoglycemia.  Patient started using levemir at night and states that it has been helping her blood sugar.  Patient also started to eat dinner later so her bolus insulin doses are more spread out.  Patient started Trulicity and completed one injection so far.  She reports some nausea and upset stomach but will try a few more doses to see if side effect subside. Patient was agreeable to stay on Trulicity until next A1C draw to see if medication is helping. Patient states that she has had a lot of burning recently with the Levemir so she decreased her dose to 40 units once daily.        PLAN:  - Continue Trulicity 0.75 mg once weekly injections.    - Continue Humalog 14 units with dinner  - Continue Levemir 50 units in the evening   - Continue Humalog 16 units with breakfast and lunch.    - Continue to test blood sugar levels and watch for signs/symptoms of hypoglycemia.  Instructed patient to call if having hypoglycemic events.  Patient verbalized understanding.      Follow up: 6 weeks

## 2024-02-27 NOTE — PROGRESS NOTES
Subjective   Patient ID: Shanika Knowles is a 73 y.o. female who presents for Diabetes.    Referring Provider: Sagnita Telles DO     Diabetes  She presents for her follow-up diabetic visit. She has type 2 diabetes mellitus. Her disease course has been worsening. There are no hypoglycemic associated symptoms. There are no hypoglycemic complications. Required assistance: Patient drinks a coke to help with hypoglycemic episodes. Her weight is increasing steadily. She is following a generally unhealthy diet. Her home blood glucose trend is fluctuating dramatically.     In the new year Levemir will no longer be available and patient will have to switch to Lantus.  At this time patient has a few boxes left of Levemir and will finish supply before transitioning.      Review of Systems    Objective     There were no vitals taken for this visit.     Labs  Lab Results   Component Value Date    BILITOT 1.6 (H) 09/25/2023    CALCIUM 9.7 09/25/2023    CO2 23 09/25/2023     09/25/2023    CREATININE 1.06 (H) 09/25/2023    GLUCOSE 232 (H) 09/25/2023    ALKPHOS 74 09/25/2023    K 4.2 09/25/2023    PROT 7.2 09/25/2023     09/25/2023    AST 33 09/25/2023    ALT 27 09/25/2023    BUN 17 09/25/2023    ANIONGAP 16 09/25/2023    ALBUMIN 4.2 09/25/2023    GFRF 55 (A) 09/25/2023     Lab Results   Component Value Date    TRIG 153 (H) 09/25/2023    CHOL 212 (H) 09/25/2023    HDL 57.9 09/25/2023     Lab Results   Component Value Date    HGBA1C 9.2 (A) 12/28/2023       Current Outpatient Medications on File Prior to Visit   Medication Sig Dispense Refill    albuterol 90 mcg/actuation inhaler Inhale 1-2 puffs every 4 hours if needed.      aspirin 81 mg chewable tablet once every 24 hours.      lancets (OneTouch Delica Lancets) 33 gauge misc Use to test blood glucose three times daily 300 each 3    losartan (Cozaar) 25 mg tablet Take 1 tablet (25 mg) by mouth once daily. 90 tablet 1    montelukast (Singulair) 10 mg tablet Take 1  "tablet (10 mg) by mouth once daily at bedtime. 90 tablet 1    salmeterol (Serevent Diskus) 50 mcg/dose diskus inhaler Inhale 1 puff every 12 hours. 60 each 2    Synthroid 112 mcg tablet Take 1 tablet (112 mcg) by mouth once daily. 90 tablet 3    tiZANidine (Zanaflex) 2 mg tablet Take 1 tablet (2 mg) by mouth every 6 hours if needed for muscle spasms for up to 20 doses. 10 tablet 1    [DISCONTINUED] dulaglutide (Trulicity) 0.75 mg/0.5 mL pen injector Inject 0.75 mg under the skin 1 (one) time per week. 2 mL 1    [DISCONTINUED] HumaLOG KwikPen Insulin 200 unit/mL (3 mL) insulin pen pen ADMINISTER 18 UNITS UNDER THE SKIN THREE TIMES DAILY 9 mL 3    [DISCONTINUED] insulin detemir (Levemir FlexPen) 100 unit/mL (3 mL) pen Inject 50 units in the morning 45 mL 0    [DISCONTINUED] OneTouch Ultra Test strip Use to test blood glucose three times daily 300 strip 3    [DISCONTINUED] pen needle, diabetic (BD Melvi 2nd Gen Pen Needle) 32 gauge x 5/32\" needle 1 Needle 4 times a day. 400 each 1     No current facility-administered medications on file prior to visit.      CURRENT PHARMACOTHERAPY  - Humalog 200u/mL 18u tid w meals  - Levemir 100u/mL 50 units in the morning (patient experiences muscle pain and joint pain at higher doses)    HISTORICAL PHARMACOTHERAPY  - Ozempic caused joint and muscle pain  - Jardiance caused decrease in kidney function  - Metformin caused severe migraines  - Januvia caused headaches   - Pioglitazone caused pain  - exenatide cause headaches    Assessment/Plan   Problem List Items Addressed This Visit       Type 2 diabetes mellitus, with long-term current use of insulin (CMS/[facility])     Patient currently uncontrolled with A1c of 9.2% (goal of <8%).    Patient was able to receive Freestyle Soha 2 sensors from a medical supply company and has been using them since last visit.  Since the last appointment patient has reported no hypoglycemia.  Patient started using levemir at night and states that it has been " "helping her blood sugar.  Patient also started to eat dinner later so her bolus insulin doses are more spread out.  Patient started Trulicity and completed one injection so far.  She reports some nausea and upset stomach but will try a few more doses to see if side effect subside. Patient was agreeable to stay on Trulicity until next A1C draw to see if medication is helping. Patient states that she has had a lot of burning recently with the Levemir so she decreased her dose to 40 units once daily.        PLAN:  - Continue Trulicity 0.75 mg once weekly injections.    - Continue Humalog 14 units with dinner  - Continue Levemir 50 units in the evening   - Continue Humalog 16 units with breakfast and lunch.    - Continue to test blood sugar levels and watch for signs/symptoms of hypoglycemia.  Instructed patient to call if having hypoglycemic events.  Patient verbalized understanding.      Follow up: 6 weeks          Relevant Medications    HumaLOG KwikPen Insulin 200 unit/mL (3 mL) insulin pen pen    insulin detemir (Levemir FlexPen) 100 unit/mL (3 mL) pen    OneTouch Ultra Test strip    dulaglutide (Trulicity) 0.75 mg/0.5 mL pen injector    Other Relevant Orders    Follow Up In [facility] - Pharmacy     Other Visit Diagnoses       Uncontrolled type 2 diabetes mellitus with hyperglycemia (CMS/[facility])        Relevant Medications    pen needle, diabetic (BD Melvi 2nd Gen Pen Needle) 32 gauge x 5/32\" christina Stock, PharmD    Continue all meds under the continuation of care with the referring provider and clinical pharmacy team.         "

## 2024-03-12 DIAGNOSIS — E11.21 TYPE 2 DIABETES MELLITUS WITH DIABETIC NEPHROPATHY, WITH LONG-TERM CURRENT USE OF INSULIN (MULTI): ICD-10-CM

## 2024-03-12 DIAGNOSIS — Z79.4 TYPE 2 DIABETES MELLITUS WITH DIABETIC NEPHROPATHY, WITH LONG-TERM CURRENT USE OF INSULIN (MULTI): ICD-10-CM

## 2024-03-12 RX ORDER — LANCETS 33 GAUGE
EACH MISCELLANEOUS
Qty: 300 EACH | Refills: 3 | Status: SHIPPED | OUTPATIENT
Start: 2024-03-12

## 2024-04-03 NOTE — PROGRESS NOTES
Subjective   Patient ID: Shanika Knowles is a 73 y.o. female who presents for Follow-up (Pt presents for 3 month follow up- pt would like to discuss her trulicity side effects with you. ).    HPI     Patient presents today for 3 month follow-up.    Patient concerns:  # DM meds, see below.    ROUTINE VISIT  CHRONIC CONDITIONS:     Diabetes mellitus, type 2  Co-managed with [facility] pharmacy, plan per last visit 2/27/2024:  Patient currently uncontrolled with A1c of 9.2% (goal of <8%).    Patient was able to receive Freestyle Soha 2 sensors from [employer] and has been using them since last visit.  Since the last appointment patient has reported no hypoglycemia.  Patient started using levemir at night and states that it has been helping her blood sugar.  Patient also started to eat dinner later so her bolus insulin doses are more spread out.  Patient started Trulicity and completed one injection so far.  She reports some nausea and upset stomach but will try a few more doses to see if side effect subside. Patient was agreeable to stay on Trulicity until next A1C draw to see if medication is helping. Patient states that she has had a lot of burning recently with the Levemir so she decreased her dose to 40 units once daily.         PLAN:  - Continue Trulicity 0.75 mg once weekly injections.    - Continue Humalog 14 units with dinner  - Continue Levemir 50 units in the evening   - Continue Humalog 16 units with breakfast and lunch.    - Continue to test blood sugar levels and watch for signs/symptoms of hypoglycemia.  Instructed patient to call if having hypoglycemic events.  Patient verbalized understanding.      Follow up: 6 weeks      Hgb A1c: 9.2 in 4/2024, 9.2 in 12/2023, 9.0 in 9/2023, 8.8 in 6/2023  Albumin: negative 9/2023  Foot exam: 12/2023 via PCP - also sees podiatry  Eye exam: 11/2022 - patient reports done in June 2023     Current regimen:  Trulicity 0.75 mg weekly, started 1/2024  Humalog 12-18u tid  w meals  Levemir 50u AM only  Titrating insulin with help of clinical pharmacists     Prior medications:   Metformin , unable to tolerate 2/2 hives and migraines.  Pioglitazone, unable to tolerate 2/2 hives and migraines.  Januvia, unable to tolerate 2/2 migraines.  Jardiance, unable to tolerate 2/2 side effects & decrease in kidney fxn.  Ozempic, unable to tolerate 2/2 due to joint and muscle pain.    Admits to recent dietary indiscretions with Farida recently.  Had a lot of candies and other food she normally does not eat.  States she gets conflicting numbers with  disparity in her meters.    Also notes she is unable to exercise due to pain everywhere. She primarily complaints of right sided neck, trapezius pain radiating down to low back. Does feel the tizanidine helps and would like refill of this.    She states she is disinclined to increase the Trulicity, worse edema and inflammatory response. States feels stomach is burning from inside also having distention and fatigue.    She states she has burning with Levemir from head to toe, got better with decreasing to units but went back up to 45 units for better control.     Thinks she needs Humalog 18 TID, only taking 16 TID per pharmacy per patient.    Reports she had hypoglycemia with stacking of insulins at bedtime, none since stopping this.    Hypothyroidism acquired  Current dose: Synthroid 112 mcg daily.   6/2023 TSH was normal.    Medication is being taken as directed and is well-tolerated.   Patient denies heat or cold intolerance, diarrhea or constipation, coarsening of hair, and palpitations.     Hypertension  Taking Losartan 25 mg daily.     Medications are being taken and tolerated well.   No side effects are reported.  Patient is taking blood pressure outside of office and reports good control.  Patient denies chest pain, shortness of breath with exertion, palpitations, lower extremity edema, headache, or dizziness.      Hyperlipidemia/CAD  Lifestyle managed, does take baby aspirin every other day.  Patient previously disinclined to start statin therapy due to possible side effects    5/2021 CACS was 205.36.  1/2022 TC/HDL ratio 4.2 ()  9/2023 TC/HDL ratio 3.7 ().    Hx of decreased kidney function  She is s/p nephrology evaluation with Dr. Ferrari, was told everything is normal. GFR 65 at that time, was told does not have CKD III and told to come back as needed.    9/2023 GFR 55 + Cr 1.06  6/2022 GFR 55 + Cr 1.07    Review of Systems   All other systems reviewed and are negative.      Objective   /76 (BP Location: Right arm, Patient Position: Sitting, BP Cuff Size: Large adult)   Pulse 83   Temp 37.1 °C (98.8 °F) (Temporal)   Resp 16   Wt 99.1 kg (218 lb 8 oz)   SpO2 96%   BMI 35.27 kg/m²     Physical Exam  Vitals and nursing note reviewed.   Constitutional:       General: She is not in acute distress.     Appearance: Normal appearance. She is not toxic-appearing.   HENT:      Head: Normocephalic and atraumatic.   Cardiovascular:      Rate and Rhythm: Normal rate and regular rhythm.      Heart sounds: No murmur heard.     No friction rub. No gallop.   Pulmonary:      Effort: Pulmonary effort is normal.      Breath sounds: Normal breath sounds. No wheezing, rhonchi or rales.   Musculoskeletal:      Right lower leg: Edema (trace) present.      Left lower leg: Edema (trace) present.   Neurological:      General: No focal deficit present.      Mental Status: She is alert and oriented to person, place, and time.   Psychiatric:         Mood and Affect: Mood normal.         Behavior: Behavior normal.         Assessment/Plan   Problem List Items Addressed This Visit             ICD-10-CM    Acquired hypothyroidism E03.9     6/2023 TSH was normal, to be checked annually, repeat TSH ordered to be done prior 3 month follow-up, ccm in the interim.    CURRENT DOSE: Synthroid 112 mcg daily.          Asthma J45.909      Salmeterol diskus refilled at patient request.         Essential hypertension I10     BP is 124/76 in office today, good control.  Goal BP is 130/80 or less, ideally 120/80 or less.   Continue Losartan 25 mg daily.  Continue with home monitoring.         Hyperlipidemia E78.5     Lifestyle managed, does take baby aspirin every other day.  Patient previously disinclined to start statin therapy due to possible side effects    5/2021 CACS was 205.36.    1/2022 TC/HDL ratio 4.2 ()  9/2023 TC/HDL ratio 3.7 ().  Goal TC/HDL ratio 3.4 or less, LDL 99 or less,  or less, and TRIG 150 or less.    Repeat lipid panel ordered to be done prior to 3 month follow-up.  Diet and exercise recommendations revisited.         Class 2 obesity with body mass index (BMI) of 35.0 to 35.9 in adult E66.9, Z68.35    Type 2 diabetes mellitus, with long-term current use of insulin (CMS/[facility]) - Primary E11.9, Z79.4     Hgb A1c: 9.2 in 4/2024, 9.2 in 12/2023, 9.0 in 9/2023, 8.8 in 6/2023, 9.9 in 3/2023.  Albumin: negative 9/2023    A1c remains stable from prior check 3 months ago, still elevated above goal.  Goal A1c is 8.0 or less, ideally 7.0 or less.    She endorses possible side effects from the Trulicity including reflux, distention, and malaise. I have advised these symptoms can also be caused from inflammation from her reported dietary indiscretions. I have asked that she stop the Trulicity for 2-3 weeks to see if there is a change in these symptoms.    At this time patient opts to defer co-management with the clinical pharmacist. She wishes to take insulin with sliding scale while working nutrition and exercise activity for 3 months.     Current regimen:  Trulicity 0.75 mg weekly - stop as of 4/4/2024  Levemir - 45 units daily  Humalog - start back at 18u tid w meals - increase per sliding scale as noted in patient wrap-up summary.    Since A1c suboptimally controlled will plan to repeat in 3 months.  Repeat A1c and  full labs to be done prior to your follow-up in 3 months.    Advised that she can wear her Dexcom and take glucometer to check sugars at same time as her lab draw in 3 months to compare values/accuracy.    DM RECS:   Diabetic eye exams are recommended once a year.   Checking kidneys for leaking of protein should be done yearly.   Foot checks should be done daily for any cracks, blisters, calluses or skin breakdown if there is any neuropathy.  Good control of diabetes can decrease the risk of kidney damage, neuropathy, and vision loss from diabetes.   Make sure you are avoiding refined carbs such as breads, pasta, cereal, candy, soda,  nutrition bars, granola, chips, and sugar sweetened beverages.      Eat 5- 7 servings daily of veggies,  healthy protein such as chicken, fish,  beans, and eggs, and include healthy fats in your diet such as seeds, nuts, olive oil, avocados, and salmon.   Exercise 4 - 6 days per week as you are able, 150 minutes total weekly divided up is recommended.           Other Visit Diagnoses         Codes    Spasm of muscle of lower back     M62.830            Follow-up in 3-4 months for recheck DM  Labs to be done prior.   Call for sooner follow-up if needed.         Scribe Attestation  By signing my name below, IAvril Scribe   attest that this documentation has been prepared under the direction and in the presence of Sangita Telles DO.

## 2024-04-04 ENCOUNTER — OFFICE VISIT (OUTPATIENT)
Dept: PRIMARY CARE | Facility: CLINIC | Age: 74
End: 2024-04-04
Payer: MEDICARE

## 2024-04-04 VITALS
WEIGHT: 218.5 LBS | RESPIRATION RATE: 16 BRPM | OXYGEN SATURATION: 96 % | BODY MASS INDEX: 35.27 KG/M2 | HEART RATE: 83 BPM | TEMPERATURE: 98.8 F | DIASTOLIC BLOOD PRESSURE: 76 MMHG | SYSTOLIC BLOOD PRESSURE: 124 MMHG

## 2024-04-04 DIAGNOSIS — J45.909 ASTHMA, UNSPECIFIED ASTHMA SEVERITY, UNSPECIFIED WHETHER COMPLICATED, UNSPECIFIED WHETHER PERSISTENT (HHS-HCC): ICD-10-CM

## 2024-04-04 DIAGNOSIS — Z79.4 TYPE 2 DIABETES MELLITUS WITH DIABETIC NEPHROPATHY, WITH LONG-TERM CURRENT USE OF INSULIN (MULTI): Primary | ICD-10-CM

## 2024-04-04 DIAGNOSIS — I10 ESSENTIAL HYPERTENSION: ICD-10-CM

## 2024-04-04 DIAGNOSIS — E11.21 TYPE 2 DIABETES MELLITUS WITH DIABETIC NEPHROPATHY, WITH LONG-TERM CURRENT USE OF INSULIN (MULTI): Primary | ICD-10-CM

## 2024-04-04 DIAGNOSIS — M62.830 SPASM OF MUSCLE OF LOWER BACK: ICD-10-CM

## 2024-04-04 DIAGNOSIS — E78.5 HYPERLIPIDEMIA, UNSPECIFIED HYPERLIPIDEMIA TYPE: ICD-10-CM

## 2024-04-04 DIAGNOSIS — E03.9 ACQUIRED HYPOTHYROIDISM: ICD-10-CM

## 2024-04-04 DIAGNOSIS — E66.9 CLASS 2 OBESITY WITH BODY MASS INDEX (BMI) OF 35.0 TO 35.9 IN ADULT, UNSPECIFIED OBESITY TYPE, UNSPECIFIED WHETHER SERIOUS COMORBIDITY PRESENT: ICD-10-CM

## 2024-04-04 PROBLEM — N18.30 STAGE 3 CHRONIC KIDNEY DISEASE (MULTI): Status: ACTIVE | Noted: 2024-04-04

## 2024-04-04 PROBLEM — E55.9 VITAMIN D DEFICIENCY: Status: ACTIVE | Noted: 2024-04-04

## 2024-04-04 LAB — POC HEMOGLOBIN A1C: 9.2 % (ref 4.2–6.5)

## 2024-04-04 PROCEDURE — 99215 OFFICE O/P EST HI 40 MIN: CPT | Performed by: FAMILY MEDICINE

## 2024-04-04 PROCEDURE — 83036 HEMOGLOBIN GLYCOSYLATED A1C: CPT | Performed by: FAMILY MEDICINE

## 2024-04-04 PROCEDURE — 3008F BODY MASS INDEX DOCD: CPT | Performed by: FAMILY MEDICINE

## 2024-04-04 PROCEDURE — 3074F SYST BP LT 130 MM HG: CPT | Performed by: FAMILY MEDICINE

## 2024-04-04 PROCEDURE — 4010F ACE/ARB THERAPY RXD/TAKEN: CPT | Performed by: FAMILY MEDICINE

## 2024-04-04 PROCEDURE — 1160F RVW MEDS BY RX/DR IN RCRD: CPT | Performed by: FAMILY MEDICINE

## 2024-04-04 PROCEDURE — 1159F MED LIST DOCD IN RCRD: CPT | Performed by: FAMILY MEDICINE

## 2024-04-04 PROCEDURE — 3078F DIAST BP <80 MM HG: CPT | Performed by: FAMILY MEDICINE

## 2024-04-04 RX ORDER — TOBRAMYCIN 3 MG/ML
SOLUTION/ DROPS OPHTHALMIC
COMMUNITY
Start: 2024-02-16 | End: 2024-04-05 | Stop reason: WASHOUT

## 2024-04-04 NOTE — ASSESSMENT & PLAN NOTE
Hgb A1c: 9.2 in 4/2024, 9.2 in 12/2023, 9.0 in 9/2023, 8.8 in 6/2023, 9.9 in 3/2023.  Albumin: negative 9/2023    A1c remains stable from prior check 3 months ago, still elevated above goal.  Goal A1c is 8.0 or less, ideally 7.0 or less.    She endorses possible side effects from the Trulicity including reflux, distention, and malaise. I have advised these symptoms can also be caused from inflammation from her reported dietary indiscretions. I have asked that she stop the Trulicity for 2-3 weeks to see if there is a change in these symptoms.    At this time patient opts to defer co-management with the clinical pharmacist. She wishes to take insulin with sliding scale while working nutrition and exercise activity for 3 months.     Current regimen:  Trulicity 0.75 mg weekly - stop as of 4/4/2024  Levemir - 45 units daily  Humalog - start back at 18u tid w meals - increase per sliding scale as noted in patient wrap-up summary.    Since A1c suboptimally controlled will plan to repeat in 3 months.  Repeat A1c and full labs to be done prior to your follow-up in 3 months.    Advised that she can wear her Dexcom and take glucometer to check sugars at same time as her lab draw in 3 months to compare values/accuracy.    DM RECS:   Diabetic eye exams are recommended once a year.   Checking kidneys for leaking of protein should be done yearly.   Foot checks should be done daily for any cracks, blisters, calluses or skin breakdown if there is any neuropathy.  Good control of diabetes can decrease the risk of kidney damage, neuropathy, and vision loss from diabetes.   Make sure you are avoiding refined carbs such as breads, pasta, cereal, candy, soda,  nutrition bars, granola, chips, and sugar sweetened beverages.      Eat 5- 7 servings daily of veggies,  healthy protein such as chicken, fish,  beans, and eggs, and include healthy fats in your diet such as seeds, nuts, olive oil, avocados, and salmon.   Exercise 4 - 6 days per  week as you are able, 150 minutes total weekly divided up is recommended.

## 2024-04-04 NOTE — ASSESSMENT & PLAN NOTE
Lifestyle managed, does take baby aspirin every other day.  Patient previously disinclined to start statin therapy due to possible side effects    5/2021 CACS was 205.36.    1/2022 TC/HDL ratio 4.2 ()  9/2023 TC/HDL ratio 3.7 ().  Goal TC/HDL ratio 3.4 or less, LDL 99 or less,  or less, and TRIG 150 or less.    Repeat lipid panel ordered to be done prior to 3 month follow-up.  Diet and exercise recommendations revisited.

## 2024-04-04 NOTE — ASSESSMENT & PLAN NOTE
BP is 124/76 in office today, good control.  Goal BP is 130/80 or less, ideally 120/80 or less.   Continue Losartan 25 mg daily.  Continue with home monitoring.

## 2024-04-04 NOTE — ASSESSMENT & PLAN NOTE
9/2023 GFR 55 + Cr 1.06  6/2022 GFR 55 + Cr 1.07    Kidney function mildly decreased from normal, remains stable from prior check  Will continue to monitor labs on regular basis.  Revisited good BP control, avoiding NSAIDs, and adequate hydration as measures to further protect kidneys.

## 2024-04-04 NOTE — ASSESSMENT & PLAN NOTE
6/2023 TSH was normal, to be checked annually, repeat TSH ordered to be done prior 3 month follow-up, ccm in the interim.    CURRENT DOSE: Synthroid 112 mcg daily.

## 2024-04-04 NOTE — PATIENT INSTRUCTIONS
DM     Due to gi side effects,  stop Trulicity    Levemir dose 45 units daily    Humalog 18 units with 1st meal   18 units with 2nd meal  14- 16 units with 3rd meal (if you have one)     Check sugar before meals:           Sliding scale dosing of rapid acting insulin for elevated sugars at meal time    Insulin used for this is called: Humalog    Take this at meals, in addition to your usual insulin dose     If glucose reading is:  100-150 Take an additional 0 Units.   151-200 Take an additional 2 Units.  201-250 Take an additional 4 Units.   251-300 Take an additional 6 Units.   301-350 Take an additional 8 Units.   351-400 Take an additional 10 Units.   Over 400 Take an additional 12 Units.     Do labs, follow up in 3 mo     We recommend limitation of refined carbohydrates such as pasta, pretzels, breads, , sugar -sweetened foods or beverages, alcohol, pastries, cereals, or bagels.   We do recommend eating lots of vegetables- 5 -7 servings of veggies daily. Eat lean proteins, and some fruits.  Eat small amounts of healthy fat daily, such as a handful of almonds, walnuts, pumpkin seeds, a serving of salmon, a splash of olive oil on your salad, an avocado.       Healthy nutritional choices decrease conditions like elevated cholesterol, elevated sugars, elevated weight, elevated blood pressure, elevated inflammation.    Healthy choices can also lower risk of events such as strokes, heart attacks, and cancer.     Make most of your food intake plant- based.   Have occasional treats if you like, but try not to overindulge.     Some sort of heart-rate increasing movement or exercise is recommended 4 - 6 days per week, even if in 5 or 10 min increments  several times throughout the day.     Patient is aware that insulin causes weight gain, but she is  more comfortable on insulin than other medications.   She has unusual side effects from many medications, and many of those are dose dependent.       We will have clinical  pharm consultation placed on hold -       may resume after patient's next A1C       WHEN YOU COME IN TO GET YOUR BLOODWORK DONE BEFORE NEXT OFFICE VISIT,   WEAR YOUR DEXCOM AND WRITE DOWN THAT NUMBER WHEN YOU HAVE YOUR DRAW DONE,   ALSO CHECK YOUR SUGAR WITH YOUR FINGER STICK GLUCOMETER.    WRITE DOWN THAT NUMBER.   BRING THEM BOTH IN WITH YOU.     See you in 3 mo!

## 2024-04-05 RX ORDER — SALMETEROL XINAFOATE 50 UG/1
1 POWDER, METERED ORAL; RESPIRATORY (INHALATION) EVERY 12 HOURS
Qty: 60 EACH | Refills: 2 | Status: SHIPPED | OUTPATIENT
Start: 2024-04-05

## 2024-04-05 RX ORDER — LOSARTAN POTASSIUM 25 MG/1
25 TABLET ORAL DAILY
Qty: 90 TABLET | Refills: 1 | Status: SHIPPED | OUTPATIENT
Start: 2024-04-05

## 2024-04-05 RX ORDER — INSULIN LISPRO 200 [IU]/ML
INJECTION, SOLUTION SUBCUTANEOUS
Qty: 9 ML | Refills: 3 | Status: SHIPPED | OUTPATIENT
Start: 2024-04-05

## 2024-04-05 RX ORDER — MONTELUKAST SODIUM 10 MG/1
10 TABLET ORAL NIGHTLY
Qty: 90 TABLET | Refills: 1 | Status: SHIPPED | OUTPATIENT
Start: 2024-04-05

## 2024-04-05 RX ORDER — INSULIN DETEMIR 100 [IU]/ML
INJECTION, SOLUTION SUBCUTANEOUS
Qty: 15 ML | Refills: 0 | Status: SHIPPED | OUTPATIENT
Start: 2024-04-05 | End: 2024-05-08 | Stop reason: SDUPTHER

## 2024-04-05 RX ORDER — TIZANIDINE 2 MG/1
2 TABLET ORAL EVERY 6 HOURS PRN
Qty: 10 TABLET | Refills: 1 | Status: SHIPPED | OUTPATIENT
Start: 2024-04-05

## 2024-04-09 ENCOUNTER — APPOINTMENT (OUTPATIENT)
Dept: PHARMACY | Facility: HOSPITAL | Age: 74
End: 2024-04-09
Payer: MEDICARE

## 2024-04-24 ENCOUNTER — TELEPHONE (OUTPATIENT)
Dept: PRIMARY CARE | Facility: CLINIC | Age: 74
End: 2024-04-24
Payer: MEDICARE

## 2024-04-24 DIAGNOSIS — E11.21 TYPE 2 DIABETES MELLITUS WITH DIABETIC NEPHROPATHY, WITH LONG-TERM CURRENT USE OF INSULIN (MULTI): Primary | ICD-10-CM

## 2024-04-24 DIAGNOSIS — Z79.4 TYPE 2 DIABETES MELLITUS WITH DIABETIC NEPHROPATHY, WITH LONG-TERM CURRENT USE OF INSULIN (MULTI): Primary | ICD-10-CM

## 2024-04-24 NOTE — TELEPHONE ENCOUNTER
----- Message from Marilee Patterson CMA sent at 4/23/2024 11:08 AM EDT -----  Regarding: FW: Dietary  Contact: [phone]    ----- Message -----  From: Shanika Knowles  Sent: 4/23/2024  11:07 AM EDT  To: [facility] Clinical Support Staff  Subject: Dietary                                          Hello!  I hope you are well.  I would appreciate working with a dietician.   Can I get started with that?  I'm doing something wrong, obviously!  Thanks for taking care of me.  Have a fabulous day.

## 2024-05-08 DIAGNOSIS — E11.21 TYPE 2 DIABETES MELLITUS WITH DIABETIC NEPHROPATHY, WITH LONG-TERM CURRENT USE OF INSULIN (MULTI): ICD-10-CM

## 2024-05-08 DIAGNOSIS — Z79.4 TYPE 2 DIABETES MELLITUS WITH DIABETIC NEPHROPATHY, WITH LONG-TERM CURRENT USE OF INSULIN (MULTI): ICD-10-CM

## 2024-05-08 RX ORDER — INSULIN DETEMIR 100 [IU]/ML
INJECTION, SOLUTION SUBCUTANEOUS
Qty: 15 ML | Refills: 1 | Status: SHIPPED | OUTPATIENT
Start: 2024-05-08

## 2024-05-14 NOTE — PROGRESS NOTES
Subjective   Patient ID: Shanika Knowles is a 73 y.o. female who presents for Follow-up and Annual Exam.  HPI  This 73-year-old female last seen in the office in May 2023 is being seen in follow-up.  She is an insulin-dependent diabetic with asthma with an underlying bilateral sensorineural hearing loss.  She has had difficulties in the past with otitis externa which required debridement and drop therapy.  She does have a peanut sensitivity but has been advised to use oil drops for her ear and to avoid water especially soapy water in the ears.  Review of Systems  A 12 point ROS has been reviewed and are negative for complaint except what is stated in the history of present illness and/or past medical history as noted in the EMR  Objective   Physical Exam  Examination:     CONSTITUTIONAL: Alert, in no acute distress, normal pitch/clarity of voice, overweight, cooperative.     HEAD/FACE: Normocephalic, atraumatic, no tenderness over the sinuses, facial strength and movement symmetric.     SKIN: Good turgor, no rashes, no suspicious lesions, in the head and neck.     EYES: Both eyes have normal extraocular movements with no nystagmus, pupils are equal and reactive to light and accommodation, conjunctiva is clear.     EARS: See procedure note    NOSE--No external skin lesions are noted, nares are patent, septum is intact and noninflamed, nasal turbinates are normal in appearance, sinuses are nontender to palpation bilaterally, no internal lesions or polyps are noted, no discharge is noted     ORAL--Mucous membranes of the oropharynx and the oral cavity proper are without lesions or ulcerations, tongue mobility is normal and no lesions are noted, gingiva and alveolar mucosa is intact without lesions, oral mucosa is moist, muscular movement of the palate and gag reflex are normal. Status post tonsillectomy     NECK: No lymphadenopathy is palpated, neck is supple with full range of motion, thyroid is without swelling or  tenderness, trachea is midline, no neck masses are noted.     NEUROLOGIC: Alert and oriented, cranial nerves are grossly intact, gait is normal, sensation in the head and neck is intact,     PSYCH: oriented to person, place and time, normal mood and affect.     EXTREMITIES: No motor dysfunction of the upper and lower extremity is noted.      Her audiogram today in both ears revealed normal hearing up through 2000 Hz and then a mild to moderate high-frequency sensory hearing loss is noted with 100% discrimination ability.  Tympanograms are normal.  There is no change from previous audiograms.     Assessment/Plan   Problem List Items Addressed This Visit             ICD-10-CM    Asthma ([facility]) J45.909    Bilateral sensorineural hearing loss - Primary H90.3    Type 2 diabetes mellitus, with long-term current use of insulin ([facility]) E11.9, Z79.4     Other Visit Diagnoses         Codes    Dryness of both ear canals     H61.893          I discussed the present hearing test findings with the patient. Since the last test there has been no significant change in the hearing of individual frequencies sound. Discrimination ability remains basically unchanged. It would be advised that a yearly audiogram be done unless symptoms develop in regards to progressive loss, new onset vertigo, or changes regarding tinnitus. Avoidance of loud noise without ear protection is advised. Rehabilitation using hearing aids was discussed as a treatment option.  Any change in hearing sensitivity or discrimination of language should be reported to the office during the year.    I discussed the clinical findings with the patient. Dry skin in the ear canals well account for a variety of symptoms including itching, irritation, pain if the skin is infected which can then also be associated with otorrhea. The skin in the ear canal is unlike skin anywhere else and can be treated by lubrication. External hand cream can be utilized on the external ear and  opening of the ear canal along with the use of oil drops such as baby oil or sweet oil or prescription drops in the form of VoSol HC.  This is being used instead of fluocinolone due to her peanut allergy.  Avoidance of water in the ear and avoidance of Q-tip use is recommended. If pain and otorrhea develops then infection is suggested and medicated antibiotic drops or acetic acid drops would be used as treatment.       Aquiles Knapp DMD, MD 05/20/24 11:18 AM

## 2024-05-20 ENCOUNTER — CLINICAL SUPPORT (OUTPATIENT)
Dept: AUDIOLOGY | Facility: CLINIC | Age: 74
End: 2024-05-20
Payer: MEDICARE

## 2024-05-20 ENCOUNTER — OFFICE VISIT (OUTPATIENT)
Dept: OTOLARYNGOLOGY | Facility: CLINIC | Age: 74
End: 2024-05-20
Payer: MEDICARE

## 2024-05-20 DIAGNOSIS — J45.909 ASTHMA, UNSPECIFIED ASTHMA SEVERITY, UNSPECIFIED WHETHER COMPLICATED, UNSPECIFIED WHETHER PERSISTENT (HHS-HCC): ICD-10-CM

## 2024-05-20 DIAGNOSIS — H90.3 BILATERAL SENSORINEURAL HEARING LOSS: Primary | ICD-10-CM

## 2024-05-20 DIAGNOSIS — E11.69 TYPE 2 DIABETES MELLITUS WITH OTHER SPECIFIED COMPLICATION, WITH LONG-TERM CURRENT USE OF INSULIN (MULTI): ICD-10-CM

## 2024-05-20 DIAGNOSIS — H61.893 DRYNESS OF BOTH EAR CANALS: ICD-10-CM

## 2024-05-20 DIAGNOSIS — Z79.4 TYPE 2 DIABETES MELLITUS WITH OTHER SPECIFIED COMPLICATION, WITH LONG-TERM CURRENT USE OF INSULIN (MULTI): ICD-10-CM

## 2024-05-20 PROCEDURE — 4010F ACE/ARB THERAPY RXD/TAKEN: CPT | Performed by: AUDIOLOGIST

## 2024-05-20 PROCEDURE — 4010F ACE/ARB THERAPY RXD/TAKEN: CPT | Performed by: OTOLARYNGOLOGY

## 2024-05-20 PROCEDURE — 1160F RVW MEDS BY RX/DR IN RCRD: CPT | Performed by: OTOLARYNGOLOGY

## 2024-05-20 PROCEDURE — 1160F RVW MEDS BY RX/DR IN RCRD: CPT | Performed by: AUDIOLOGIST

## 2024-05-20 PROCEDURE — 1036F TOBACCO NON-USER: CPT | Performed by: AUDIOLOGIST

## 2024-05-20 PROCEDURE — 92557 COMPREHENSIVE HEARING TEST: CPT | Performed by: AUDIOLOGIST

## 2024-05-20 PROCEDURE — 92567 TYMPANOMETRY: CPT | Performed by: AUDIOLOGIST

## 2024-05-20 PROCEDURE — 3008F BODY MASS INDEX DOCD: CPT | Performed by: OTOLARYNGOLOGY

## 2024-05-20 PROCEDURE — 1159F MED LIST DOCD IN RCRD: CPT | Performed by: OTOLARYNGOLOGY

## 2024-05-20 PROCEDURE — 1036F TOBACCO NON-USER: CPT | Performed by: OTOLARYNGOLOGY

## 2024-05-20 PROCEDURE — 99214 OFFICE O/P EST MOD 30 MIN: CPT | Performed by: OTOLARYNGOLOGY

## 2024-05-20 PROCEDURE — 1159F MED LIST DOCD IN RCRD: CPT | Performed by: AUDIOLOGIST

## 2024-05-20 PROCEDURE — 3008F BODY MASS INDEX DOCD: CPT | Performed by: AUDIOLOGIST

## 2024-05-20 RX ORDER — HYDROCORTISONE AND ACETIC ACID 20.75; 10.375 MG/ML; MG/ML
SOLUTION AURICULAR (OTIC)
Qty: 10 ML | Refills: 1 | Status: SHIPPED | OUTPATIENT
Start: 2024-05-20

## 2024-05-20 RX ORDER — CLOTRIMAZOLE AND BETAMETHASONE DIPROPIONATE 10; .64 MG/G; MG/G
CREAM TOPICAL
Qty: 45 G | Refills: 1 | Status: SHIPPED | OUTPATIENT
Start: 2024-05-20

## 2024-05-20 ASSESSMENT — PATIENT HEALTH QUESTIONNAIRE - PHQ9: 2. FEELING DOWN, DEPRESSED OR HOPELESS: NOT AT ALL

## 2024-05-20 ASSESSMENT — COLUMBIA-SUICIDE SEVERITY RATING SCALE - C-SSRS: 1. IN THE PAST MONTH, HAVE YOU WISHED YOU WERE DEAD OR WISHED YOU COULD GO TO SLEEP AND NOT WAKE UP?: NO

## 2024-05-20 NOTE — PROGRESS NOTES
[facility]UD[facility]  AUDIOMETRIC EVALUATION      Name:  Shanika Knowles   :  1950  Age:  73 y.o.  Date of Evaluation:  24    HISTORY    Shanika Knowles is seen today at the request of Aquiles Knapp M.D., ONDINA., F.A.C.S.    EVALUATION  See scanned audiogram in Media and included at the end of this report.    RESULTS    Otoscopic Evaluation:  Right Ear:  clear   Left Ear:  clear    Tympanometry:   Right Ear:  Type A, consistent with normal eardrum mobility and middle ear pressure   Left Ear:  Type A, consistent with normal eardrum mobility and middle ear pressure    Acoustic reflexes were not completed    Pure Tone Audiometry:    Right Ear:  normal to moderate sensorineural hearing loss  Left Ear:  normal to moderate sensorineural hearing loss       Speech Audiometry:    Right Ear:  excellent in quiet at a normal presentation level  Left Ear:  excellent in quiet at a normal presentation level  Speech reception thresholds were in good agreement with pure tone testing.    DISCUSSION  Results were relayed to Aquiles Knapp M.D., ONDINA., F.A.C.S.    APPOINTMENT TIME  8:30-9:00am      Cindi Álvarez  Doctor of Audiology  Senior Audiologist

## 2024-07-08 DIAGNOSIS — E11.21 TYPE 2 DIABETES MELLITUS WITH DIABETIC NEPHROPATHY, WITH LONG-TERM CURRENT USE OF INSULIN (MULTI): ICD-10-CM

## 2024-07-08 DIAGNOSIS — Z79.4 TYPE 2 DIABETES MELLITUS WITH DIABETIC NEPHROPATHY, WITH LONG-TERM CURRENT USE OF INSULIN (MULTI): ICD-10-CM

## 2024-07-08 RX ORDER — INSULIN DETEMIR 100 [IU]/ML
INJECTION, SOLUTION SUBCUTANEOUS
Qty: 15 ML | Refills: 1 | Status: SHIPPED | OUTPATIENT
Start: 2024-07-08

## 2024-07-15 ENCOUNTER — LAB (OUTPATIENT)
Dept: LAB | Facility: LAB | Age: 74
End: 2024-07-15
Payer: MEDICARE

## 2024-07-15 DIAGNOSIS — Z79.4 TYPE 2 DIABETES MELLITUS WITH DIABETIC NEPHROPATHY, WITH LONG-TERM CURRENT USE OF INSULIN (MULTI): ICD-10-CM

## 2024-07-15 DIAGNOSIS — E78.5 HYPERLIPIDEMIA, UNSPECIFIED HYPERLIPIDEMIA TYPE: ICD-10-CM

## 2024-07-15 DIAGNOSIS — E11.21 TYPE 2 DIABETES MELLITUS WITH DIABETIC NEPHROPATHY, WITH LONG-TERM CURRENT USE OF INSULIN (MULTI): ICD-10-CM

## 2024-07-15 DIAGNOSIS — I10 ESSENTIAL HYPERTENSION: ICD-10-CM

## 2024-07-15 DIAGNOSIS — E03.9 ACQUIRED HYPOTHYROIDISM: ICD-10-CM

## 2024-07-15 LAB
ANION GAP SERPL CALC-SCNC: 11 MMOL/L (ref 10–20)
BUN SERPL-MCNC: 18 MG/DL (ref 6–23)
CALCIUM SERPL-MCNC: 9.7 MG/DL (ref 8.6–10.6)
CHLORIDE SERPL-SCNC: 106 MMOL/L (ref 98–107)
CHOLEST SERPL-MCNC: 210 MG/DL (ref 0–199)
CHOLESTEROL/HDL RATIO: 3.9
CO2 SERPL-SCNC: 25 MMOL/L (ref 21–32)
CREAT SERPL-MCNC: 0.92 MG/DL (ref 0.5–1.05)
EGFRCR SERPLBLD CKD-EPI 2021: 65 ML/MIN/1.73M*2
ERYTHROCYTE [DISTWIDTH] IN BLOOD BY AUTOMATED COUNT: 12.5 % (ref 11.5–14.5)
EST. AVERAGE GLUCOSE BLD GHB EST-MCNC: 226 MG/DL
GLUCOSE SERPL-MCNC: 222 MG/DL (ref 74–99)
HBA1C MFR BLD: 9.5 %
HCT VFR BLD AUTO: 43.7 % (ref 36–46)
HDLC SERPL-MCNC: 54.4 MG/DL
HGB BLD-MCNC: 14.5 G/DL (ref 12–16)
LDLC SERPL CALC-MCNC: 123 MG/DL
MCH RBC QN AUTO: 30 PG (ref 26–34)
MCHC RBC AUTO-ENTMCNC: 33.2 G/DL (ref 32–36)
MCV RBC AUTO: 91 FL (ref 80–100)
NON HDL CHOLESTEROL: 156 MG/DL (ref 0–149)
NRBC BLD-RTO: 0 /100 WBCS (ref 0–0)
PLATELET # BLD AUTO: 210 X10*3/UL (ref 150–450)
POTASSIUM SERPL-SCNC: 4.2 MMOL/L (ref 3.5–5.3)
RBC # BLD AUTO: 4.83 X10*6/UL (ref 4–5.2)
SODIUM SERPL-SCNC: 138 MMOL/L (ref 136–145)
T4 FREE SERPL-MCNC: 1.23 NG/DL (ref 0.78–1.48)
TRIGL SERPL-MCNC: 164 MG/DL (ref 0–149)
TSH SERPL-ACNC: 0.34 MIU/L (ref 0.44–3.98)
VLDL: 33 MG/DL (ref 0–40)
WBC # BLD AUTO: 7.3 X10*3/UL (ref 4.4–11.3)

## 2024-07-15 PROCEDURE — 84439 ASSAY OF FREE THYROXINE: CPT

## 2024-07-15 PROCEDURE — 83036 HEMOGLOBIN GLYCOSYLATED A1C: CPT

## 2024-07-15 PROCEDURE — 80061 LIPID PANEL: CPT

## 2024-07-15 PROCEDURE — 80048 BASIC METABOLIC PNL TOTAL CA: CPT

## 2024-07-15 PROCEDURE — 84443 ASSAY THYROID STIM HORMONE: CPT

## 2024-07-15 PROCEDURE — 36415 COLL VENOUS BLD VENIPUNCTURE: CPT

## 2024-07-15 PROCEDURE — 85027 COMPLETE CBC AUTOMATED: CPT

## 2024-07-18 ENCOUNTER — APPOINTMENT (OUTPATIENT)
Dept: PRIMARY CARE | Facility: CLINIC | Age: 74
End: 2024-07-18
Payer: MEDICARE

## 2024-07-18 VITALS
SYSTOLIC BLOOD PRESSURE: 124 MMHG | WEIGHT: 225 LBS | BODY MASS INDEX: 36.32 KG/M2 | TEMPERATURE: 99 F | HEART RATE: 84 BPM | OXYGEN SATURATION: 94 % | RESPIRATION RATE: 14 BRPM | DIASTOLIC BLOOD PRESSURE: 73 MMHG

## 2024-07-18 DIAGNOSIS — E03.9 ACQUIRED HYPOTHYROIDISM: ICD-10-CM

## 2024-07-18 DIAGNOSIS — E78.5 HYPERLIPIDEMIA, UNSPECIFIED HYPERLIPIDEMIA TYPE: ICD-10-CM

## 2024-07-18 DIAGNOSIS — Z87.448 HISTORY OF DECREASED RENAL FUNCTION: ICD-10-CM

## 2024-07-18 DIAGNOSIS — I10 ESSENTIAL HYPERTENSION: ICD-10-CM

## 2024-07-18 DIAGNOSIS — Z79.4 TYPE 2 DIABETES MELLITUS WITH OTHER SPECIFIED COMPLICATION, WITH LONG-TERM CURRENT USE OF INSULIN (MULTI): Primary | ICD-10-CM

## 2024-07-18 DIAGNOSIS — E11.69 TYPE 2 DIABETES MELLITUS WITH OTHER SPECIFIED COMPLICATION, WITH LONG-TERM CURRENT USE OF INSULIN (MULTI): Primary | ICD-10-CM

## 2024-07-18 PROBLEM — N18.30 STAGE 3 CHRONIC KIDNEY DISEASE (MULTI): Status: RESOLVED | Noted: 2024-04-04 | Resolved: 2024-07-18

## 2024-07-18 PROCEDURE — 1160F RVW MEDS BY RX/DR IN RCRD: CPT | Performed by: FAMILY MEDICINE

## 2024-07-18 PROCEDURE — 3078F DIAST BP <80 MM HG: CPT | Performed by: FAMILY MEDICINE

## 2024-07-18 PROCEDURE — 82043 UR ALBUMIN QUANTITATIVE: CPT

## 2024-07-18 PROCEDURE — 3046F HEMOGLOBIN A1C LEVEL >9.0%: CPT | Performed by: FAMILY MEDICINE

## 2024-07-18 PROCEDURE — 99214 OFFICE O/P EST MOD 30 MIN: CPT | Performed by: FAMILY MEDICINE

## 2024-07-18 PROCEDURE — 3049F LDL-C 100-129 MG/DL: CPT | Performed by: FAMILY MEDICINE

## 2024-07-18 PROCEDURE — 3074F SYST BP LT 130 MM HG: CPT | Performed by: FAMILY MEDICINE

## 2024-07-18 PROCEDURE — 4010F ACE/ARB THERAPY RXD/TAKEN: CPT | Performed by: FAMILY MEDICINE

## 2024-07-18 PROCEDURE — 1036F TOBACCO NON-USER: CPT | Performed by: FAMILY MEDICINE

## 2024-07-18 PROCEDURE — 1159F MED LIST DOCD IN RCRD: CPT | Performed by: FAMILY MEDICINE

## 2024-07-18 PROCEDURE — 82570 ASSAY OF URINE CREATININE: CPT

## 2024-07-18 NOTE — ASSESSMENT & PLAN NOTE
BP is 124/73 in office today, good control.  Goal BP is 130/80 or less, ideally 120/80 or less.   Continue Losartan 25 mg daily.  Continue with home monitoring.

## 2024-07-18 NOTE — ASSESSMENT & PLAN NOTE
Lifestyle managed, does take baby aspirin every other day.  Patient previously disinclined to start statin therapy due to possible side effects    5/2021 CACS was 205.36.    1/2022 TC/HDL ratio 4.2 ()  9/2023 TC/HDL ratio 3.7 ().  7/2024 TC/HDL ratio 3.9 ()  Goal TC/HDL ratio 3.4 or less, LDL 99 or less,  or less, and TRIG 150 or less.    Repeat lipid panel ordered to be done prior to 3 month follow-up.  Diet and exercise recommendations revisited.

## 2024-07-18 NOTE — ASSESSMENT & PLAN NOTE
Hgb A1c: 9.5 in 7/2024, 9.2 in 4/2024, 9.2 in 12/2023, 9.0 in 9/2023, 8.8 in 6/2023  Albumin: negative 9/2023 - ordered 7/2024 pending at time of visit today.    A1c remains elevated, overall stable from prior check 3 months ago but still above goal.  Goal A1c is 8.0 or less, ideally 7.0 or less.    Patient intolerant to numerous oral agents including Metformin, Pioglitazone, Januvia, Jardiance. Also intolerant to GLP-1 agents Ozempic and Trulicity. Currently on both short and long acting insulin, have recommended titrating long-acting insulin as noted below. Diet and exercise recommendations revisited. Will recheck A1c in 3 months. Urine Albumin collected today, pending at time of visit.    Current regimen:  Levemir - 45 units daily - see titration below.  Humalog - 18u tid w meals    3-2-1 TITRATION PLAN FOR INSULIN  We will be titrating up your insulin based on your fasting blood sugar numbers (before you eat any breakfast). This titration is for long acting insulins such as Lantus or Levemir. - YOU HAVE LEVEMIR    Check your fasting blood sugar every day.   Your goal for fasting blood sugar is ideally between 80 and 120.      If your fasting blood sugar exceeds 100 every day for 3 days in a row, increase your long acting insulin by 2 units. (If you take long acting insulin twice daily, increase each dose by 2 units.). Continue that dose for 3 days. If your fasting blood sugar exceeds 100 for 3 more days, increase by 2 more units. This is called the 3-2-1 titration plan.    For example, if you are on glargine (Lantus) 20 units and your fasting blood sugar for 3 days in a row is 140, 138, 162, then increase your Lantus to 22 units. Check your blood sugar fasting each day. If for 3 more days your blood sugar exceeds 100, go up to 24 units. If for the following 3 days your blood sugar is, for example, 122, 130, 112, you would then increase to 26 units of Lantus for three days.

## 2024-07-18 NOTE — ASSESSMENT & PLAN NOTE
7/2024 TSH 0.34 (low), T4 1.23 (nml)    On synthroid therapy managed via PCP  Current dose: Synthroid 112 mcg daily.     CCM of synthroid, will recheck TSH in 3 months since only mildly abnormal.  Further discuss at 3 month follow-up.

## 2024-07-18 NOTE — ASSESSMENT & PLAN NOTE
She is s/p nephrology evaluation with Dr. Ferrari, was told everything is normal. GFR 65 at that time, was told does not have CKD III and told to come back as needed.    7/2024 GFR 65 + Cr 0.92 which is normal range.  Will continue to monitor labs on routine basis.

## 2024-07-18 NOTE — PROGRESS NOTES
Subjective   Patient ID: Shanika Knowles is a 73 y.o. female who presents for Follow-up (Pt presents for 3 month follow up- no new concerns at this time.).    HPI     Patient presents today for 3 month follow-up.    Patient concerns:    Patient has no new concerns, here to discuss routine conditions as noted below.    ROUTINE VISIT  CHRONIC CONDITIONS:     Diabetes mellitus, type 2  Co-managed with [facility] pharmacy, plan per last visit 2/27/2024:  Patient currently uncontrolled with A1c of 9.2% (goal of <8%).    Patient was able to receive Freestyle Soha 2 sensors from [employer] and has been using them since last visit.  Since the last appointment patient has reported no hypoglycemia.  Patient started using levemir at night and states that it has been helping her blood sugar.  Patient also started to eat dinner later so her bolus insulin doses are more spread out.  Patient started Trulicity and completed one injection so far.  She reports some nausea and upset stomach but will try a few more doses to see if side effect subside. Patient was agreeable to stay on Trulicity until next A1C draw to see if medication is helping. Patient states that she has had a lot of burning recently with the Levemir so she decreased her dose to 40 units once daily.         PLAN:  - Continue Trulicity 0.75 mg once weekly injections.    - Continue Humalog 14 units with dinner  - Continue Levemir 50 units in the evening   - Continue Humalog 16 units with breakfast and lunch.    - Continue to test blood sugar levels and watch for signs/symptoms of hypoglycemia.  Instructed patient to call if having hypoglycemic events.  Patient verbalized understanding.      Follow up: 6 weeks      Hgb A1c: 9.5 in 7/2024, 9.2 in 4/2024, 9.2 in 12/2023, 9.0 in 9/2023, 8.8 in 6/2023  Albumin: negative 9/2023 - ordered 7/2024 NOT DONE  Foot exam: 12/2023 via PCP - also sees podiatry  Eye exam: 11/2022 - patient reports done in June 2023     Current  regimen:  Humalog 12-18u tid w meals  Levemir 50u AM only  Titrating insulin with help of clinical pharmacists     Prior medications:   Metformin , unable to tolerate 2/2 hives and migraines.  Pioglitazone, unable to tolerate 2/2 hives and migraines.  Januvia, unable to tolerate 2/2 migraines.  Jardiance, unable to tolerate 2/2 side effects & decrease in kidney fxn.  Ozempic, unable to tolerate 2/2 due to joint and muscle pain.  Trulicity, unable to tolerate 2/2 side effect    Patient has been giving Humalog 18 units 3 times per day (AM, afternoon, PM). Given long-acting 45 unites daily. Sometimes only does twice if falls asleep early    Admits to dietary indiscretions, having cake/sweets with celebrations.  Patient has not had BS lower than 80 in last 3 months  Lowest BS in last 3 months 104  Knows DM uncontrolled, not keen on changing meds  Wants to be more mindful of food consumption.  Does weights almost daily, has been more regular about this lately.  Walking twice per week and gardens 3-4 hours per day.    Hypothyroidism acquired  On synthroid therapy managed via PCP  Current dose: Synthroid 112 mcg daily.   7/2024 TSH 0.34 (low), T4 1.23 (nml)    Hypertension  Taking Losartan 25 mg daily.     Hyperlipidemia/CAD  Lifestyle managed, does take baby aspirin every other day.  Patient previously disinclined to start statin therapy due to possible side effects    5/2021 CACS was 205.36.    1/2022 TC/HDL ratio 4.2 ()  9/2023 TC/HDL ratio 3.7 ()  7/2024 TC/HDL ratio 3.9 ()    Hx of decreased kidney function  She is s/p nephrology evaluation with Dr. Ferrari, was told everything is normal. GFR 65 at that time, was told does not have CKD III and told to come back as needed.    7/2024 GFR 65 + Cr 0.92  9/2023 GFR 55 + Cr 1.06  6/2022 GFR 55 + Cr 1.07    Review of Systems   All other systems reviewed and are negative.      Objective   /73 (BP Location: Right arm, Patient Position: Sitting, BP  Cuff Size: Large adult)   Pulse 84   Temp 37.2 °C (99 °F) (Temporal)   Resp 14   Wt 102 kg (225 lb)   SpO2 94%   BMI 36.32 kg/m²     Physical Exam  Vitals and nursing note reviewed.   Constitutional:       General: She is not in acute distress.     Appearance: Normal appearance. She is not toxic-appearing.   HENT:      Head: Normocephalic and atraumatic.   Cardiovascular:      Rate and Rhythm: Normal rate and regular rhythm.      Heart sounds: No murmur heard.     No friction rub. No gallop.   Pulmonary:      Effort: Pulmonary effort is normal.      Breath sounds: Normal breath sounds. No wheezing, rhonchi or rales.   Lymphadenopathy:      Cervical: No cervical adenopathy.   Neurological:      General: No focal deficit present.      Mental Status: She is alert and oriented to person, place, and time.   Psychiatric:         Mood and Affect: Mood normal.         Behavior: Behavior normal.         Assessment/Plan   Problem List Items Addressed This Visit             ICD-10-CM    Acquired hypothyroidism E03.9     7/2024 TSH 0.34 (low), T4 1.23 (nml)    On synthroid therapy managed via PCP  Current dose: Synthroid 112 mcg daily.     CCM of synthroid, will recheck TSH in 3 months since only mildly abnormal.  Further discuss at 3 month follow-up.         History of decreased renal function Z87.448     She is s/p nephrology evaluation with Dr. Ferrari, was told everything is normal. GFR 65 at that time, was told does not have CKD III and told to come back as needed.    7/2024 GFR 65 + Cr 0.92 which is normal range.  Will continue to monitor labs on routine basis.         Essential hypertension I10     BP is 124/73 in office today, good control.  Goal BP is 130/80 or less, ideally 120/80 or less.   Continue Losartan 25 mg daily.  Continue with home monitoring.         Hyperlipidemia E78.5     Lifestyle managed, does take baby aspirin every other day.  Patient previously disinclined to start statin therapy due to  possible side effects    5/2021 CACS was 205.36.    1/2022 TC/HDL ratio 4.2 ()  9/2023 TC/HDL ratio 3.7 ().  7/2024 TC/HDL ratio 3.9 ()  Goal TC/HDL ratio 3.4 or less, LDL 99 or less,  or less, and TRIG 150 or less.    Repeat lipid panel ordered to be done prior to 3 month follow-up.  Diet and exercise recommendations revisited.         Type 2 diabetes mellitus, with long-term current use of insulin (Multi) - Primary E11.9, Z79.4     Hgb A1c: 9.5 in 7/2024, 9.2 in 4/2024, 9.2 in 12/2023, 9.0 in 9/2023, 8.8 in 6/2023  Albumin: negative 9/2023 - ordered 7/2024 pending at time of visit today.    A1c remains elevated, overall stable from prior check 3 months ago but still above goal.  Goal A1c is 8.0 or less, ideally 7.0 or less.    Patient intolerant to numerous oral agents including Metformin, Pioglitazone, Januvia, Jardiance. Also intolerant to GLP-1 agents Ozempic and Trulicity. Currently on both short and long acting insulin, have recommended titrating long-acting insulin as noted below. Diet and exercise recommendations revisited. Will recheck A1c in 3 months. Urine Albumin collected today, pending at time of visit.    Current regimen:  Levemir - 45 units daily - see titration below.  Humalog - 18u tid w meals    3-2-1 TITRATION PLAN FOR INSULIN  We will be titrating up your insulin based on your fasting blood sugar numbers (before you eat any breakfast). This titration is for long acting insulins such as Lantus or Levemir. - YOU HAVE LEVEMIR    Check your fasting blood sugar every day.   Your goal for fasting blood sugar is ideally between 80 and 120.      If your fasting blood sugar exceeds 100 every day for 3 days in a row, increase your long acting insulin by 2 units. (If you take long acting insulin twice daily, increase each dose by 2 units.). Continue that dose for 3 days. If your fasting blood sugar exceeds 100 for 3 more days, increase by 2 more units. This is called the 3-2-1  titration plan.    For example, if you are on glargine (Lantus) 20 units and your fasting blood sugar for 3 days in a row is 140, 138, 162, then increase your Lantus to 22 units. Check your blood sugar fasting each day. If for 3 more days your blood sugar exceeds 100, go up to 24 units. If for the following 3 days your blood sugar is, for example, 122, 130, 112, you would then increase to 26 units of Lantus for three days.               Follow-up in 3-4 months for recheck DM, thyroid, HLD.  Labs to be done prior.   Call for sooner follow-up if needed.         Scribe Attestation  By signing my name below, IAvril Scribe   attest that this documentation has been prepared under the direction and in the presence of Sangita Telles DO.

## 2024-07-18 NOTE — PATIENT INSTRUCTIONS
Type 2 diabetes mellitus, with long-term current use of insulin (Multi)  Hgb A1c: 9.5 in 7/2024, 9.2 in 4/2024, 9.2 in 12/2023, 9.0 in 9/2023, 8.8 in 6/2023  Albumin: negative 9/2023 - ordered 7/2024 pending at time of visit today.    A1c remains elevated, overall stable from prior check 3 months ago but still above goal.  Goal A1c is 8.0 or less, ideally 7.0 or less.    Patient intolerant to numerous oral agents including Metformin, Pioglitazone, Januvia, Jardiance. Also intolerant to GLP-1 agents Ozempic and Trulicity. Currently on both short and long acting insulin, have recommended titrating long-acting insulin as noted below. Diet and exercise recommendations revisited. Will recheck A1c in 3 months. Urine Albumin collected today, pending at time of visit.    Current regimen:  Levemir - 45 units daily - see titration below.  Humalog - 18u tid w meals    3-2-1 TITRATION PLAN FOR INSULIN  We will be titrating up your insulin based on your fasting blood sugar numbers (before you eat any breakfast). This titration is for long acting insulins such as Lantus or Levemir. - YOU HAVE LEVEMIR    Check your fasting blood sugar every day.   Your goal for fasting blood sugar is ideally between 80 and 120.      If your fasting blood sugar exceeds 100 every day for 3 days in a row, increase your long acting insulin by 2 units. (If you take long acting insulin twice daily, increase each dose by 2 units.). Continue that dose for 3 days. If your fasting blood sugar exceeds 100 for 3 more days, increase by 2 more units. This is called the 3-2-1 titration plan.    For example, if you are on glargine (Lantus) 20 units and your fasting blood sugar for 3 days in a row is 140, 138, 162, then increase your Lantus to 22 units. Check your blood sugar fasting each day. If for 3 more days your blood sugar exceeds 100, go up to 24 units. If for the following 3 days your blood sugar is, for example, 122, 130, 112, you would then increase to  26 units of Lantus for three days.     Essential hypertension  BP is 124/73 in office today, good control.  Goal BP is 130/80 or less, ideally 120/80 or less.   Continue Losartan 25 mg daily.  Continue with home monitoring.    History of decreased renal function  She is s/p nephrology evaluation with Dr. Ferrari, was told everything is normal. GFR 65 at that time, was told does not have CKD III and told to come back as needed.    7/2024 GFR 65 + Cr 0.92 which is normal range.  Will continue to monitor labs on routine basis.    Acquired hypothyroidism  7/2024 TSH 0.34 (low), T4 1.23 (nml)    On synthroid therapy managed via PCP  Current dose: Synthroid 112 mcg daily.     CCM of synthroid, will recheck TSH in 3 months since only mildly abnormal.  Further discuss at 3 month follow-up.    Hyperlipidemia  Lifestyle managed, does take baby aspirin every other day.  Patient previously disinclined to start statin therapy due to possible side effects    5/2021 CACS was 205.36.    1/2022 TC/HDL ratio 4.2 ()  9/2023 TC/HDL ratio 3.7 ().  7/2024 TC/HDL ratio 3.9 ()  Goal TC/HDL ratio 3.4 or less, LDL 99 or less,  or less, and TRIG 150 or less.    Repeat lipid panel ordered to be done prior to 3 month follow-up.  Diet and exercise recommendations revisited.    Lifestyle recommendations for overall wellness, as you are interested or able -     We recommend limitation of refined carbohydrates such as pasta, pretzels, chips, breads, bagels or cereals- particularly white flour containing.   Limit sugar sweetened foods or beverages, alcohol, pastries, candy, sports drinks or sodas.  (Water is best.)  Minimize diet drinks with artificial sugars.  If craving a sweetened food or beverage, two sweeteners from natural sources without substantial calories or glucose-raising properties are monk fruit extract or stevia.     Shoot for drinking 64 oz water daily unless conditions such as heart failure limit your  recommended intake (Ask your doctor if you're not sure.)    Whole grain foods can be part of a healthy diet, and include such things as steel cut oats, brown rice, quinoa, millet, Michele or other sprouted breads (often found in the freezer section), amaranth, teff, farrow., etc.  Wheat/buckwheat/spelt can be added if you are not gluten-sensitive, and if these foods don't cause you bloating or symptoms of inflammation.       We do recommend eating lots of vegetables- 5 -7 servings of veggies daily, which is good fiber source as well as vitamins and minerals. (Think egg bake with spinach, peppers, onions, for breakfast,  celery with nut butter, or hummus and cucumbers as snacks,  salads with lunch and/or dinner, 1 - 2 veggies with lunch and or dinner,  etc)    Eat lean proteins -shoot for 70 - 100 g of protein per day unless you have restrictions from kidney disease, etc.  Think hard boiled eggs, beans, seeds/lentils, cottage cheese, consider protein powder such as whey or pea powder in steel cut oats or a homemade smoothie.     Eat some healthy fat daily, such as a handful of almonds, walnuts, pumpkin seeds, a serving of salmon, a splash of olive oil on your salad, an avocado.       Healthy nutritional choices decrease conditions like elevated cholesterol, elevated sugars, elevated weight, elevated blood pressure, and elevated inflammation.    Healthy choices can also lower risk of events such as strokes, heart attacks, and cancer.     Make most of your food intake plant- based.   Have occasional treats if you like, but try not to overindulge.     Some sort of heart-rate increasing movement or exercise is recommended 4 - 6 days per week, even if in 5 or 10 min increments several times throughout the day.     Include within that exercise time some strength/ resistance exercises at least 3 days per week.  This can be through use of resistance bands, free weights, machines, body weight lifting, heavy outdoor chores  such as yardwork, mulching, chopping wood, etc.   Stretching/range of motion exercises should also be included as part of your exercise time - such as yoga, fuentes chi, or even just post- exercise stretching.  Moving exercised muscles beyond day to day usual activities can help you stay limber and flexible, decrease injury risk, and minimize aches and pains with everyday movements.     Either in combination with exercise or just as a self-care quiet practice, spending time in nature has a wide range of positive effects on your health and wellbeing, including improved mood, improved blood pressure, and improved immune function.   Even 10 - 20 minutes a few days per week can make a difference.     Consider visiting [email or web address].org/naturerx     This site is a new partnership between [facility] and local physicians to include a prescription for nature as part of your self-care and wellness.      Healthy sleep ( 6 - 8 hours if possible) and  involvement in community (neighbors, family, senior center, hobby groups, [employer] based realationships, etc) are also important parts of overall well being.      Pick one or two things to focus on per week or month and start building on your wellness promoting activities.   You deserve it!

## 2024-07-19 LAB
CREAT UR-MCNC: 141 MG/DL (ref 20–320)
MICROALBUMIN UR-MCNC: 11.2 MG/L
MICROALBUMIN/CREAT UR: 7.9 UG/MG CREAT

## 2024-08-07 DIAGNOSIS — J45.909 ASTHMA, UNSPECIFIED ASTHMA SEVERITY, UNSPECIFIED WHETHER COMPLICATED, UNSPECIFIED WHETHER PERSISTENT (HHS-HCC): ICD-10-CM

## 2024-08-07 DIAGNOSIS — Z79.4 TYPE 2 DIABETES MELLITUS WITH DIABETIC NEPHROPATHY, WITH LONG-TERM CURRENT USE OF INSULIN (MULTI): ICD-10-CM

## 2024-08-07 DIAGNOSIS — E11.21 TYPE 2 DIABETES MELLITUS WITH DIABETIC NEPHROPATHY, WITH LONG-TERM CURRENT USE OF INSULIN (MULTI): ICD-10-CM

## 2024-08-07 RX ORDER — SALMETEROL XINAFOATE 50 UG/1
1 POWDER, METERED ORAL; RESPIRATORY (INHALATION) EVERY 12 HOURS
Qty: 60 EACH | Refills: 2 | Status: SHIPPED | OUTPATIENT
Start: 2024-08-07

## 2024-08-07 RX ORDER — INSULIN LISPRO 200 [IU]/ML
INJECTION, SOLUTION SUBCUTANEOUS
Qty: 9 ML | Refills: 3 | Status: SHIPPED | OUTPATIENT
Start: 2024-08-07

## 2024-09-11 DIAGNOSIS — E11.21 TYPE 2 DIABETES MELLITUS WITH DIABETIC NEPHROPATHY, WITH LONG-TERM CURRENT USE OF INSULIN (MULTI): ICD-10-CM

## 2024-09-11 DIAGNOSIS — Z79.4 TYPE 2 DIABETES MELLITUS WITH DIABETIC NEPHROPATHY, WITH LONG-TERM CURRENT USE OF INSULIN (MULTI): ICD-10-CM

## 2024-09-11 RX ORDER — INSULIN DETEMIR 100 [IU]/ML
INJECTION, SOLUTION SUBCUTANEOUS
Qty: 15 ML | Refills: 1 | Status: SHIPPED | OUTPATIENT
Start: 2024-09-11

## 2024-10-03 DIAGNOSIS — J45.909 ASTHMA, UNSPECIFIED ASTHMA SEVERITY, UNSPECIFIED WHETHER COMPLICATED, UNSPECIFIED WHETHER PERSISTENT (HHS-HCC): ICD-10-CM

## 2024-10-03 RX ORDER — SALMETEROL XINAFOATE 50 UG/1
1 POWDER, METERED ORAL; RESPIRATORY (INHALATION) EVERY 12 HOURS
Qty: 60 EACH | Refills: 2 | Status: SHIPPED | OUTPATIENT
Start: 2024-10-03

## 2024-10-08 DIAGNOSIS — J45.909 ASTHMA, UNSPECIFIED ASTHMA SEVERITY, UNSPECIFIED WHETHER COMPLICATED, UNSPECIFIED WHETHER PERSISTENT (HHS-HCC): ICD-10-CM

## 2024-10-08 DIAGNOSIS — I10 ESSENTIAL HYPERTENSION: ICD-10-CM

## 2024-10-08 RX ORDER — LOSARTAN POTASSIUM 25 MG/1
25 TABLET ORAL DAILY
Qty: 90 TABLET | Refills: 0 | Status: SHIPPED | OUTPATIENT
Start: 2024-10-08

## 2024-10-08 RX ORDER — MONTELUKAST SODIUM 10 MG/1
10 TABLET ORAL NIGHTLY
Qty: 90 TABLET | Refills: 0 | Status: SHIPPED | OUTPATIENT
Start: 2024-10-08

## 2024-10-22 ENCOUNTER — HOSPITAL ENCOUNTER (OUTPATIENT)
Dept: RADIOLOGY | Facility: CLINIC | Age: 74
Discharge: HOME | End: 2024-10-22
Payer: MEDICARE

## 2024-10-22 ENCOUNTER — APPOINTMENT (OUTPATIENT)
Dept: ORTHOPEDIC SURGERY | Facility: CLINIC | Age: 74
End: 2024-10-22
Payer: MEDICARE

## 2024-10-22 DIAGNOSIS — M99.73 CONNECTIVE TISSUE AND DISC STENOSIS OF INTERVERTEBRAL FORAMINA OF LUMBAR REGION: ICD-10-CM

## 2024-10-22 DIAGNOSIS — M47.814 THORACIC SPONDYLOSIS: ICD-10-CM

## 2024-10-22 DIAGNOSIS — M47.816 LUMBAR SPONDYLOSIS: ICD-10-CM

## 2024-10-22 DIAGNOSIS — M47.816 LUMBAR SPONDYLOSIS: Primary | ICD-10-CM

## 2024-10-22 PROCEDURE — 1036F TOBACCO NON-USER: CPT | Performed by: PHYSICAL MEDICINE & REHABILITATION

## 2024-10-22 PROCEDURE — 72070 X-RAY EXAM THORAC SPINE 2VWS: CPT

## 2024-10-22 PROCEDURE — 72070 X-RAY EXAM THORAC SPINE 2VWS: CPT | Performed by: RADIOLOGY

## 2024-10-22 PROCEDURE — 99203 OFFICE O/P NEW LOW 30 MIN: CPT | Performed by: PHYSICAL MEDICINE & REHABILITATION

## 2024-10-22 PROCEDURE — 3049F LDL-C 100-129 MG/DL: CPT | Performed by: PHYSICAL MEDICINE & REHABILITATION

## 2024-10-22 PROCEDURE — 1160F RVW MEDS BY RX/DR IN RCRD: CPT | Performed by: PHYSICAL MEDICINE & REHABILITATION

## 2024-10-22 PROCEDURE — 3061F NEG MICROALBUMINURIA REV: CPT | Performed by: PHYSICAL MEDICINE & REHABILITATION

## 2024-10-22 PROCEDURE — 4010F ACE/ARB THERAPY RXD/TAKEN: CPT | Performed by: PHYSICAL MEDICINE & REHABILITATION

## 2024-10-22 PROCEDURE — 72100 X-RAY EXAM L-S SPINE 2/3 VWS: CPT

## 2024-10-22 PROCEDURE — 3046F HEMOGLOBIN A1C LEVEL >9.0%: CPT | Performed by: PHYSICAL MEDICINE & REHABILITATION

## 2024-10-22 PROCEDURE — 1159F MED LIST DOCD IN RCRD: CPT | Performed by: PHYSICAL MEDICINE & REHABILITATION

## 2024-10-22 PROCEDURE — 72100 X-RAY EXAM L-S SPINE 2/3 VWS: CPT | Performed by: RADIOLOGY

## 2024-10-22 SDOH — SOCIAL STABILITY: SOCIAL NETWORK: SOCIAL ACTIVITY:: 10

## 2024-10-22 NOTE — PROGRESS NOTES
New Consult/New Patient Note    10/22/2024   No ref. provider found    Assessment: Pleasant 74-year-old female with chronic primarily right lower back pain.  Also intermittent thoracic and right hip pain.  Intermittent symptoms that radiate down the right leg.   -Suspect right lumbar radiculitis  -Significant component of myofascial spasm.  Thoracic spondylosis  -Possible right intra-articular hip pathology/osteoarthritis    PLAN:  1)  Imaging/Diagnostic Studies: We will obtain updated thoracic and lumbar x-rays to further assess.  Consider right hip imaging in the future.  2)  Therapy/Rehabilitation: New consult provided for physical therapy  3)  Pharmacological Management: Agree with tizanidine as needed-advised potential sedate of side effects  4)  Spine/Surgical Interventions: None at this time  5)  Alternative Treatments: May consider alternative treatment options in the future including manipulation (chiropractor versus osteopathic) and/or acupuncture if patient does not obtain optimal relief with initial treatment plan.  6)  Consultations: Physical therapy  7)  Follow -up: 4-6 weeks or PRN if symptoms worsen/do not improve.   8)  Future treatment considerations: Lumbar MRI.  Right hip x-ray    Patient advised of the difference between hurt and harm and advised to continue with all normal activities and exercises. Patient verbalized understanding of the above plan and was happy with the care provided.      The above clinical summary has been dictated with voice recognition software. It has not been proofread for grammatical errors, typographical mistakes, or other semantic inconsistencies.    Thank you for visiting our office today. It was our pleasure to take part in your healthcare.     Do not hesitate to call with any questions regarding your plan of care after leaving at [phone]    To clinicians, thank you very much for this kind referral. It is a privilege to partner with you in the care of your  patients. My office would be delighted to assist you with any further consultations or with questions regarding the plan of care outlined. Do not hesitate to call the office or contact me directly.     Sincerely,    ISIS Gonzalez MD  , Physical Medicine and Rehabilitation, Orthopedic Spine  [facility] [employer] [facility]  [facility]         Shanika Knowles   is a 74 y.o. female who presents with gait concerns and lower back pain.  Notes lifting a heavy planter in June.    Location:  Right lower back, at the pantline   Radiation:  right leg, posterior thigh and lateral thigh, to the foot and ankle somewhat diffusely.  No worsening numbness or weakness. Intermittent tingling.    Quality: achy     current 5/10,  at its worst  9/10  Exacerbated by stretching, yoga  Relieved by rest, muscle relaxer  Onset, traumatic event: no recent, lifted a pot in June of this year  Has tried:  tylenol, tizanidine     Valsalva sign is pos  Grocery cart sign is pos  Smoker:  no  Does wake them at night  Litigation: none    Patient denies bowel/bladder incontinence, denies fever, denies unintentional weight loss, denies clumsiness of hands, feet, or dropping things.  Denies any constitutional or myelopathic symptomatology.      PREVIOUS TREATMENTS  IN THE LAST SIX MONTHS     Active conservative therapy  in the last six months (see below)              1. Physical therapy:  no                                                                                   2. Home exercise program after PT: no                                                     3. A physician supervised home exercise program (HEP):n no                 4. Chiropractic Care: no                                                                   Passive conservative therapy  in the last six months (see below)              1. NSAIDS: avoiding                                                                                                         2. Prescription pain medication:    tizanidine                                                          3. Acupuncture:   no                                                                                          4. Tens unit: no     Assistive Devices: Cane    Work status: retired       ROS: Other than listed in HPI, PMHX below, and intake paperwork including a 30 point patient-recorded review of symptoms which was personally reviewed and inclusive of no history of unintentional weight loss, change in appetite, significant malaise, fevers, chills, or change in bowel/bladder, shortness of breath, or chest pain.    I have confirmed and edited as necessary Past Medical, Past Surgical, Family, Social History and ROS as obtained by others. These were also obtained on new patient forms.      PHYSICAL EXAM:   GENERAL APPEARANCE:  Well nourished, well developed, and no apparent distress.  NEURO PSYCH: Patient oriented to person, place, Mood pleasant. Benign affect.  MUSCULOSKELETAL and NEUROLOGICAL       VISUAL INSPECTION           LUMBAR: WNL  SPINE ROM:   LUMBAR ROM: Limited diffusely      PALPATION:           SPINOUS PROCESS: Nontender midline lumbar           PARASPINALS: Tenderness right lower lumbar  FACET LOADING: Positive right lower lumbar  MUSCLE BULK: Normal and symmetrical in the upper & lower extremities.  MUSCLE TONE: Normal  MOTOR: 5/5 in all muscle groups tested in bilateral lower extremities   SENSORY: Normal sensory exam to light touch in the lower extremities  GAIT: Intermittent balance deficit but predominantly steady with use of cane.  Able to go up on heels and toes with no significant weakness  Slump testing: Negative  PERIPHERAL JOINT ROM:   HIP ROM: Full bilaterally  RAYMON/Thigh Thrust/Compression/Aranza Finger:  Negative bilaterally   Hip Exam including thigh thrust and LOG ROLL: Reproduce buttock pain on the right    DATA REVIEW:   The below imaging studies were  personally reviewed and discussed with the patient.    Medical Decision Making:  The above note constitutes a Moderate to High level of medical decision making based on past data and imaging review, new and chronic symptoms with exacerbation, change in weakness or sensation, new imaging and diagnostic studies ordered, discussion of potential interventional or surgical treatment options, acute or chronic pain that may pose a threat to bodily function.    Past Medical History:   Diagnosis Date    Encounter for gynecological examination (general) (routine) without abnormal findings 11/01/2018    Well female exam with routine gynecological exam    Personal history of other drug therapy     COVID-19 vaccine series completed    Personal history of other endocrine, nutritional and metabolic disease     History of hypothyroidism    Personal history of other infectious and parasitic diseases     History of chickenpox    Stage 3 chronic kidney disease (Multi) 04/04/2024    Type 1 diabetes mellitus without complications (Multi)     Type 1 diabetes mellitus without complication       Medication Documentation Review Audit       Reviewed by Zenaida Gonzalez MA (Medical Assistant) on 10/22/24 at 0815      Medication Order Taking? Sig Documenting Provider Last Dose Status   albuterol 90 mcg/actuation inhaler [id number] No Inhale 1-2 puffs every 4 hours if needed. Historical Provider, MD Taking Active   aspirin 81 mg chewable tablet [id number] No once every 24 hours. Historical Provider, MD Taking Active   cholecalciferol, vitamin D3, (VITAMIN D3 ORAL) [id number] No Take by mouth. Historical Provider, MD Taking Active   clotrimazole-betamethasone (Lotrisone) cream [id number] No Apply thin layer on outer ear skin and open of the canal twice daily 2 days/week and use external hand cream on other days. Aquiles Knapp DMD, MD Taking Active   HumaLOG KwikPen Insulin 200 unit/mL (3 mL) insulin pen pen [id number]  ADMINISTER 18 units  "UNITS UNDER THE SKIN  with breakfast and lunch, administer 16 units under the skin with dinner. Sangita Telles, DO  Active   hydrocortisone-acetic acid (Vosol-HC) otic solution [id number] No 4 3 drops drops twice daily on 2 days/week for dryness and irritation.  Can use baby oil or sweet oil on other days as needed for itching Aquiles Knapp DMD, MD Taking Active   insulin detemir (Levemir FlexPen) 100 unit/mL (3 mL) pen [id number]  Inject 50 units in the morning Sangita Telles DO  Active   lancets (OneTouch Delica Lancets) 33 gauge misc [id number] No Use to test blood glucose three times daily Sangita Telles DO Taking Active   losartan (Cozaar) 25 mg tablet [id number]  Take 1 tablet (25 mg) by mouth once daily. Gerardo Branch MD  Active   montelukast (Singulair) 10 mg tablet [id number]  Take 1 tablet (10 mg) by mouth once daily at bedtime. Gerardo Branch MD  Active   NON FORMULARY [id number] No NEUROVITE Historical Provider, MD Taking Active   omega-3/dha/epa/dpa/fish oil (OMEGA-3 2100 ORAL) [id number] No Take by mouth. Historical Provider, MD Taking Active   OneTouch Ultra Test strip [id number] No Use to test blood glucose three times daily Sangita Telles, DO Taking Active   pen needle, diabetic (BD Melvi 2nd Gen Pen Needle) 32 gauge x 5/32\" needle [id number] No 1 Needle 4 times a day. Sangita Telles,  Taking Active   salmeterol (Serevent Diskus) 50 mcg/dose diskus inhaler [id number]  Inhale 1 puff every 12 hours. Sangita Telles DO  Active   Synthroid 112 mcg tablet [id number] No Take 1 tablet (112 mcg) by mouth once daily. Sangita Telles DO Taking Active   tiZANidine (Zanaflex) 2 mg tablet [id number] No Take 1 tablet (2 mg) by mouth every 6 hours if needed for muscle spasms for up to 20 doses. Sangita Telles DO Taking Active                    Allergies   Allergen Reactions    Penicillin V Anaphylaxis    Trulicity [Dulaglutide] Other     MUSCLE PAINS, JOINT PAINS - " SEVERE     Bee Pollen Other    Ciprofloxacin Other     GI upset    Empagliflozin Other    Erythromycin Swelling    Exenatide Headache and Other     headache    Metformin Hives     Severe Migraines    Nut - Unspecified Hives    Pioglitazone Other     pain    Semaglutide Other    Shellfish Containing Products Other    Sitagliptin Headache and Other     Headache    Sulfa (Sulfonamide Antibiotics) Other       Social History     Socioeconomic History    Marital status:      Spouse name: Not on file    Number of children: Not on file    Years of education: Not on file    Highest education level: Not on file   Occupational History    Not on file   Tobacco Use    Smoking status: Former     Current packs/day: 0.00     Types: Cigarettes     Quit date:      Years since quittin.8    Smokeless tobacco: Never   Vaping Use    Vaping status: Never Used   Substance and Sexual Activity    Alcohol use: Not Currently    Drug use: Never    Sexual activity: Not on file   Other Topics Concern    Not on file   Social History Narrative    Not on file     Social Drivers of Health     Financial Resource Strain: Not on file   Food Insecurity: Not on file   Transportation Needs: Not on file   Physical Activity: Not on file   Stress: Not on file   Social Connections: Not on file   Intimate Partner Violence: Not on file   Housing Stability: Not on file       Past Surgical History:   Procedure Laterality Date    DILATION AND CURETTAGE OF UTERUS  2018    Dilation And Curettage    OTHER SURGICAL HISTORY  2018    Anal Fistulectomy    TONSILLECTOMY  2018    Tonsillectomy

## 2024-10-29 ENCOUNTER — APPOINTMENT (OUTPATIENT)
Dept: PRIMARY CARE | Facility: CLINIC | Age: 74
End: 2024-10-29
Payer: MEDICARE

## 2024-10-29 VITALS
WEIGHT: 216 LBS | OXYGEN SATURATION: 95 % | DIASTOLIC BLOOD PRESSURE: 71 MMHG | BODY MASS INDEX: 34.72 KG/M2 | HEART RATE: 80 BPM | TEMPERATURE: 98.8 F | HEIGHT: 66 IN | SYSTOLIC BLOOD PRESSURE: 110 MMHG

## 2024-10-29 DIAGNOSIS — E03.9 ACQUIRED HYPOTHYROIDISM: ICD-10-CM

## 2024-10-29 DIAGNOSIS — Z79.4 TYPE 2 DIABETES MELLITUS WITH HYPERGLYCEMIA, WITH LONG-TERM CURRENT USE OF INSULIN: Primary | ICD-10-CM

## 2024-10-29 DIAGNOSIS — M62.830 SPASM OF MUSCLE OF LOWER BACK: ICD-10-CM

## 2024-10-29 DIAGNOSIS — E11.65 TYPE 2 DIABETES MELLITUS WITH HYPERGLYCEMIA, WITH LONG-TERM CURRENT USE OF INSULIN: Primary | ICD-10-CM

## 2024-10-29 DIAGNOSIS — Z23 IMMUNIZATION DUE: ICD-10-CM

## 2024-10-29 DIAGNOSIS — I10 ESSENTIAL HYPERTENSION: ICD-10-CM

## 2024-10-29 PROBLEM — E66.812 CLASS 2 OBESITY WITH BODY MASS INDEX (BMI) OF 35.0 TO 35.9 IN ADULT: Status: RESOLVED | Noted: 2023-06-19 | Resolved: 2024-10-29

## 2024-10-29 LAB — POC HEMOGLOBIN A1C: 9.8 % (ref 4.2–6.5)

## 2024-10-29 PROCEDURE — 1123F ACP DISCUSS/DSCN MKR DOCD: CPT | Performed by: FAMILY MEDICINE

## 2024-10-29 PROCEDURE — 3074F SYST BP LT 130 MM HG: CPT | Performed by: FAMILY MEDICINE

## 2024-10-29 PROCEDURE — 99214 OFFICE O/P EST MOD 30 MIN: CPT | Performed by: FAMILY MEDICINE

## 2024-10-29 PROCEDURE — 3046F HEMOGLOBIN A1C LEVEL >9.0%: CPT | Performed by: FAMILY MEDICINE

## 2024-10-29 PROCEDURE — 3061F NEG MICROALBUMINURIA REV: CPT | Performed by: FAMILY MEDICINE

## 2024-10-29 PROCEDURE — G0008 ADMIN INFLUENZA VIRUS VAC: HCPCS | Performed by: FAMILY MEDICINE

## 2024-10-29 PROCEDURE — 3049F LDL-C 100-129 MG/DL: CPT | Performed by: FAMILY MEDICINE

## 2024-10-29 PROCEDURE — 3078F DIAST BP <80 MM HG: CPT | Performed by: FAMILY MEDICINE

## 2024-10-29 PROCEDURE — 1159F MED LIST DOCD IN RCRD: CPT | Performed by: FAMILY MEDICINE

## 2024-10-29 PROCEDURE — 1160F RVW MEDS BY RX/DR IN RCRD: CPT | Performed by: FAMILY MEDICINE

## 2024-10-29 PROCEDURE — 90662 IIV NO PRSV INCREASED AG IM: CPT | Performed by: FAMILY MEDICINE

## 2024-10-29 PROCEDURE — 83036 HEMOGLOBIN GLYCOSYLATED A1C: CPT | Performed by: FAMILY MEDICINE

## 2024-10-29 PROCEDURE — 4010F ACE/ARB THERAPY RXD/TAKEN: CPT | Performed by: FAMILY MEDICINE

## 2024-10-29 RX ORDER — TIRZEPATIDE 2.5 MG/.5ML
2.5 INJECTION, SOLUTION SUBCUTANEOUS WEEKLY
Qty: 2 ML | Refills: 3 | Status: SHIPPED | OUTPATIENT
Start: 2024-10-29

## 2024-10-29 RX ORDER — TIZANIDINE 2 MG/1
2 TABLET ORAL EVERY 6 HOURS PRN
Qty: 10 TABLET | Refills: 1 | Status: SHIPPED | OUTPATIENT
Start: 2024-10-29

## 2024-11-11 ENCOUNTER — EVALUATION (OUTPATIENT)
Dept: PHYSICAL THERAPY | Facility: CLINIC | Age: 74
End: 2024-11-11
Payer: MEDICARE

## 2024-11-11 DIAGNOSIS — M47.816 LUMBAR SPONDYLOSIS: ICD-10-CM

## 2024-11-11 DIAGNOSIS — M54.50 CHRONIC RIGHT-SIDED LOW BACK PAIN WITHOUT SCIATICA: ICD-10-CM

## 2024-11-11 DIAGNOSIS — M54.50 LOW BACK PAIN: Primary | ICD-10-CM

## 2024-11-11 DIAGNOSIS — G89.29 CHRONIC RIGHT-SIDED LOW BACK PAIN WITHOUT SCIATICA: ICD-10-CM

## 2024-11-11 PROCEDURE — 97110 THERAPEUTIC EXERCISES: CPT | Mod: GP | Performed by: PHYSICAL THERAPIST

## 2024-11-11 PROCEDURE — 97161 PT EVAL LOW COMPLEX 20 MIN: CPT | Mod: GP | Performed by: PHYSICAL THERAPIST

## 2024-11-11 ASSESSMENT — PATIENT HEALTH QUESTIONNAIRE - PHQ9
2. FEELING DOWN, DEPRESSED OR HOPELESS: NOT AT ALL
SUM OF ALL RESPONSES TO PHQ9 QUESTIONS 1 AND 2: 0
1. LITTLE INTEREST OR PLEASURE IN DOING THINGS: NOT AT ALL

## 2024-11-11 ASSESSMENT — ENCOUNTER SYMPTOMS
DEPRESSION: 0
OCCASIONAL FEELINGS OF UNSTEADINESS: 0
LOSS OF SENSATION IN FEET: 0

## 2024-11-11 NOTE — PROGRESS NOTES
Physical Therapy    Physical Therapy Evaluation    Patient Name: Shanika Knowles  MRN: [id number]  Today's Date: 11/11/2024  Visit:1  Referred by:Fady Gonzalez  Referred for:  Current Problem  Problem List Items Addressed This Visit             ICD-10-CM    Low back pain - Primary M54.50    Relevant Orders    Follow Up In Physical Therapy    Lumbar spondylosis M47.816    Relevant Orders    Follow Up In Physical Therapy     General     Payor: MEDICARE / Plan: MEDICARE PART A AND B / Product Type: *No Product type* /   Time Calculation  Start Time: 0847  Stop Time: 0925  Time Calculation (min): 38 min  PT Evaluation Time Entry  PT Evaluation (Low) Time Entry: 20  PT Therapeutic Procedures Time Entry  Therapeutic Exercise Time Entry: 18          SUBJECTIVE  Precautions  Precautions  STEADI Fall Risk Score (The score of 4 or more indicates an increased risk of falling): 0     Pain       Chief complaint: Pt reports symptoms began last Dec the 19th she was using the weight machine at the gym and hurt her back.  The muscle relaxers really helped.  In June she was lifting a heavy planter in the garden and she felt her back pop and then she had bad pain from buttock to the back. She reports she had a little incontinence after that.  Reports pain in the bladder.  She did tell the spine specialist about these concerns.     Pain     Pain in the R hip, buttock, low back  Pain ranges:  0-9/10  Increases with: lifting  Decreases with: sitting in heating car seats  Prior level of function: Ind with ADLs  Current functional level:  Pain limits the patient's ability to lift  Home set up: no concerns  Work status: retired  Pt's stated goal: reduce pain and improve function  Pertinent PMH:DM, thyroid    Lower Extremity Strength:  Date: EVAL      Myotome RIGHT LEFT   Hip Flexion L1,2 3+ 4-   Knee Extension L3 3+ 4-   Knee Flexion S2 4- 4-   Ankle DF L4 4- 4-   Great toe Ext L5 4- 4-     Lumbar ROM:  Date: EVAL     Percentage    Flexion  75%    Extension 25%     RIGHT LEFT   Side Bend     Rotation       Joint mobility R SI joint mobility more limited than left, lumbar spine joint mobility mod decreased  Posture ant pelvic tilt, SO SI asymmetry noted today  Palpation very tender R piriformis/glute tendons  Neurological symptoms none    Special tests:  Date EVAL   LUMBAR    Slump -   SLR -   Crossed SLR -     Outcome Measures:  Other Measures  Oswestry Disablity Index (ELODIA): 20%        ASSESSMENT:  The pt presents with signs and symptoms consistent with the medical diagnosis of  low back pain with possible glute tendonitis and pelvic floor weakness.   The pt has impairments including decreased strength, decreased ROM, joint mobility, impaired posture, difficulty with gait, balance, and pain.  The pt demonstrates functional limitations which include difficulty with stairs and community ambulation and ADLs.  The pt will benefit from skilled physical therapy to reduce impairments in order to return to prior level of function.     The physical therapy prognosis is excellent for the patient to achieve their goals.   The pt tolerated therapy treatment today well with no adverse effects.  Personal factors that impact therapy include:  chronicity  Clinical presentation: Stable and Predictable   Level of clinical decision making is Low    PLAN  The pt will be seen 1 days a week for 6-12 weeks.    Medicare certification period: 11/11/2024 - 2/11/24     The pt has been educated about the risks and benefits of physical therapy including manual therapy treatments and gives consent for treatment.     The patient will benefit from physical therapy treatment to include: therapeutic exercises, therapeutic activities, neurological re-education, manual therapy, modalities, and a home exercise program.     The patient's potential to reach their therapy goals is excellent.     The evaluating therapist is prn and therefore the pt's POC may be re-assessed or discharged by  another staff therapist at this location based on scheduling and availability.  Pt is aware and agrees.     TREATMENT:    Therapeutic ex:  Access Code: [email or web address]  URL: [email or web address]  Date: 11/11/2024  Prepared by: Silas Handley    Exercises  - Supine Lower Trunk Rotation  - 1 x daily - 3 x weekly - 3 sets - 10 reps  - Supine Single Knee to Chest Stretch  - 1 x daily - 3 x weekly - 3 sets - 10 reps  - Supine Transversus Abdominis Bracing - Hands on Stomach  - 1 x daily - 3 x weekly - 3 sets - 10 reps  - Supine Hip Adduction Isometric with Ball  - 1 x daily - 3 x weekly - 3 sets - 10 reps  - Hooklying Clamshell with Resistance  - 1 x daily - 3 x weekly - 3 sets - 10 reps    Manual:  Pt educated about manual therapy risks and benefits.  Pt given opportunity to stop if needed.  Pt aware and agrees. No adverse effects were noted. For assessment purposes during this evaluation.  R and L LE distraction thoracic, lumbar and SI PA mobs  Goals:  Active       PT Problem       Pt will have pain no higher than 2/10 for at least 2 weeks        Start:  11/11/24    Expected End:  02/09/25            Pt will have strength of at least 4/5 of B LE to allow the pt to amb without difficulty        Start:  11/11/24    Expected End:  02/09/25            pt will report improved symptoms of urinary incontinence       Start:  11/11/24    Expected End:  02/09/25            ELODIA to 10% or better       Start:  11/11/24    Expected End:  02/09/25                intact

## 2024-11-12 ENCOUNTER — APPOINTMENT (OUTPATIENT)
Dept: PHARMACY | Facility: HOSPITAL | Age: 74
End: 2024-11-12
Payer: MEDICARE

## 2024-11-12 DIAGNOSIS — Z79.4 TYPE 2 DIABETES MELLITUS WITH HYPERGLYCEMIA, WITH LONG-TERM CURRENT USE OF INSULIN: ICD-10-CM

## 2024-11-12 DIAGNOSIS — E11.65 TYPE 2 DIABETES MELLITUS WITH HYPERGLYCEMIA, WITH LONG-TERM CURRENT USE OF INSULIN: ICD-10-CM

## 2024-11-12 NOTE — PROGRESS NOTES
Subjective   Patient ID: Shanika Knowles is a 74 y.o. female who presents for Diabetes.    Referring Provider: Sangita Telles,      Diabetes  She presents for her follow-up diabetic visit. She has type 2 diabetes mellitus. Her disease course has been worsening. There are no hypoglycemic associated symptoms. There are no hypoglycemic complications. Required assistance: Patient drinks a coke to help with hypoglycemic episodes. Her weight is increasing steadily. She is following a generally unhealthy diet. Her home blood glucose trend is fluctuating dramatically.     In the new year Levemir will no longer be available and patient will have to switch to Lantus.  At this time patient has a few boxes left of Levemir and will finish supply before transitioning.      Type II Diabetes  Current  Pharmacotherapy:   Humalog 18 units TID with meals   Levemir 60 units in the morning      HISTORICAL PHARMACOTHERAPY  - Ozempic caused joint and muscle pain  - Jardiance caused decrease in kidney function  - Metformin caused severe migraines  - Januvia caused headaches   - Pioglitazone caused pain  - exenatide cause headaches  - Trulicity - severe muscle aches and joint pain     SECONDARY PREVENTION  - Statin?   - ACE-I/ARB?   - Aspirin?     -The 10-year ASCVD risk score (Autumn BALDERAS, et al., 2019) is: 28.7%    Values used to calculate the score:      Age: 74 years      Sex: Female      Is Non- : No      Diabetic: Yes      Tobacco smoker: No      Systolic Blood Pressure: 110 mmHg      Is BP treated: Yes      HDL Cholesterol: 54.4 mg/dL      Total Cholesterol: 210 mg/dL      Current monitoring regimen:   Patient is using: glucometer     SMBG Fasting Readings: Did not have readings during todays appointment    Hypoglycemia? None reported     Pertinent PMH Review:  - PMH of Pancreatitis: No  - PMH/FH of Medullary Thyroid Cancer: No  - PMH of Retinopathy: No  - PMH of Urinary Tract Infections: No      Diet/Lifestyle:   She has cut out almost all sugar and carbs       Review of Systems    Objective     There were no vitals taken for this visit.     Labs  Lab Results   Component Value Date    BILITOT 1.6 (H) 09/25/2023    CALCIUM 9.7 07/15/2024    CO2 25 07/15/2024     07/15/2024    CREATININE 0.92 07/15/2024    GLUCOSE 222 (H) 07/15/2024    ALKPHOS 74 09/25/2023    K 4.2 07/15/2024    PROT 7.2 09/25/2023     07/15/2024    AST 33 09/25/2023    ALT 27 09/25/2023    BUN 18 07/15/2024    ANIONGAP 11 07/15/2024    ALBUMIN 4.2 09/25/2023    GFRF 55 (A) 09/25/2023     Lab Results   Component Value Date    TRIG 164 (H) 07/15/2024    CHOL 210 (H) 07/15/2024    LDLCALC 123 (H) 07/15/2024    HDL 54.4 07/15/2024     Lab Results   Component Value Date    HGBA1C 9.8 (A) 10/29/2024       Current Outpatient Medications on File Prior to Visit   Medication Sig Dispense Refill    albuterol 90 mcg/actuation inhaler Inhale 1-2 puffs every 4 hours if needed.      aspirin 81 mg chewable tablet once every 24 hours.      cholecalciferol, vitamin D3, (VITAMIN D3 ORAL) Take by mouth.      clotrimazole-betamethasone (Lotrisone) cream Apply thin layer on outer ear skin and open of the canal twice daily 2 days/week and use external hand cream on other days. 45 g 1    HumaLOG KwikPen Insulin 200 unit/mL (3 mL) insulin pen pen ADMINISTER 18 units UNITS UNDER THE SKIN  with breakfast and lunch, administer 16 units under the skin with dinner. 9 mL 3    hydrocortisone-acetic acid (Vosol-HC) otic solution 4 3 drops drops twice daily on 2 days/week for dryness and irritation.  Can use baby oil or sweet oil on other days as needed for itching 10 mL 1    insulin detemir (Levemir FlexPen) 100 unit/mL (3 mL) pen Inject 50 units in the morning 15 mL 1    lancets (OneTouch Delica Lancets) 33 gauge misc Use to test blood glucose three times daily 300 each 3    losartan (Cozaar) 25 mg tablet Take 1 tablet (25 mg) by mouth once daily. 90 tablet  "0    montelukast (Singulair) 10 mg tablet Take 1 tablet (10 mg) by mouth once daily at bedtime. 90 tablet 0    NON FORMULARY NEUROVITE      omega-3/dha/epa/dpa/fish oil (OMEGA-3 2100 ORAL) Take by mouth.      OneTouch Ultra Test strip Use to test blood glucose three times daily 300 strip 3    pen needle, diabetic (BD Melvi 2nd Gen Pen Needle) 32 gauge x 5/32\" needle 1 Needle 4 times a day. 400 each 3    salmeterol (Serevent Diskus) 50 mcg/dose diskus inhaler Inhale 1 puff every 12 hours. 60 each 2    Synthroid 112 mcg tablet Take 1 tablet (112 mcg) by mouth once daily. 90 tablet 3    tirzepatide (Mounjaro) 2.5 mg/0.5 mL pen injector Inject 2.5 mg under the skin 1 (one) time per week. 2 mL 3    tiZANidine (Zanaflex) 2 mg tablet Take 1 tablet (2 mg) by mouth every 6 hours if needed for muscle spasms for up to 20 doses. 10 tablet 1     No current facility-administered medications on file prior to visit.       Assessment/Plan   Problem List Items Addressed This Visit       Type 2 diabetes mellitus, with long-term current use of insulin     Patient currently uncontrolled with A1c of 9.2% (goal of <8%). Since last visit the patient has made significant changes to her diet however her A1C continues to increase.  At last PCP visit the patient was willing to try Mounjaro even though she was unable to tolerate other injectables in the past.  The patient has picked up the Mounjaro and will start this week.      Mounjaro Education:     -Counseled patient on Mounjaro MOA, expectations, side effects, duration of therapy, administration, and monitoring parameters.  -Provided detailed dosing and administration counseling to ensure proper technique.   -Reviewed Mounjaro titration schedule, starting with 2.5 mg once weekly to a goal of 15 mg once weekly if tolerated  -Counseled patient on the benefits of GLP-1ra glycemic control and weight loss  -Reviewed storage requirements of Mounjaro when not in use, and when to administer the " medication if a dose is missed.  Advised patient that they may experience improved satiety after meals and portion sizes of meals may be reduced as doses of Mounjaro increase.      PLAN:  - START Mounjaro 2.5 mg once weekly injection.    - Continue Humalog 18 units with meals and Levemir 60 units in the morning  - Continue to test blood sugar levels and watch for signs/symptoms of hypoglycemia.  Instructed patient to call if having hypoglycemic events.  Patient verbalized understanding.      Follow up: 4 weeks          Relevant Orders    Referral to Clinical Pharmacy     Alanis Stock, PharmD    Continue all meds under the continuation of care with the referring provider and clinical pharmacy team.

## 2024-11-12 NOTE — ASSESSMENT & PLAN NOTE
Patient currently uncontrolled with A1c of 9.2% (goal of <8%). Since last visit the patient has made significant changes to her diet however her A1C continues to increase.  At last PCP visit the patient was willing to try Mounjaro even though she was unable to tolerate other injectables in the past.  The patient has picked up the Mounjaro and will start this week.      Mounjaro Education:     -Counseled patient on Mounjaro MOA, expectations, side effects, duration of therapy, administration, and monitoring parameters.  -Provided detailed dosing and administration counseling to ensure proper technique.   -Reviewed Mounjaro titration schedule, starting with 2.5 mg once weekly to a goal of 15 mg once weekly if tolerated  -Counseled patient on the benefits of GLP-1ra glycemic control and weight loss  -Reviewed storage requirements of Mounjaro when not in use, and when to administer the medication if a dose is missed.  Advised patient that they may experience improved satiety after meals and portion sizes of meals may be reduced as doses of Mounjaro increase.      PLAN:  - START Mounjaro 2.5 mg once weekly injection.    - Continue Humalog 18 units with meals and Levemir 60 units in the morning  - Continue to test blood sugar levels and watch for signs/symptoms of hypoglycemia.  Instructed patient to call if having hypoglycemic events.  Patient verbalized understanding.      Follow up: 4 weeks

## 2024-11-20 DIAGNOSIS — E03.9 ACQUIRED HYPOTHYROIDISM: ICD-10-CM

## 2024-11-20 RX ORDER — LEVOTHYROXINE SODIUM 112 UG/1
112 TABLET ORAL DAILY
Qty: 90 TABLET | Refills: 3 | Status: SHIPPED | OUTPATIENT
Start: 2024-11-20 | End: 2025-11-20

## 2024-11-22 ENCOUNTER — TREATMENT (OUTPATIENT)
Dept: PHYSICAL THERAPY | Facility: CLINIC | Age: 74
End: 2024-11-22
Payer: MEDICARE

## 2024-11-22 DIAGNOSIS — M47.816 LUMBAR SPONDYLOSIS: ICD-10-CM

## 2024-11-22 DIAGNOSIS — M54.50 LOW BACK PAIN: ICD-10-CM

## 2024-11-22 PROCEDURE — 97110 THERAPEUTIC EXERCISES: CPT | Mod: GP | Performed by: PHYSICAL THERAPIST

## 2024-11-22 NOTE — PROGRESS NOTES
Physical Therapy    Physical Therapy Treatment    Patient Name: Shanika Knowles  MRN: [id number]  Today's Date: 11/22/2024  Visit #2  Time Calculation  Start Time: 0927  Stop Time: 1010  Time Calculation (min): 43 min     PT Therapeutic Procedures Time Entry  Therapeutic Exercise Time Entry: 43        Payor: MEDICARE / Plan: MEDICARE PART A AND B / Product Type: *No Product type* /   Referred by:Sangita Telles  Current Problem  Problem List Items Addressed This Visit             ICD-10-CM    Low back pain M54.50    Lumbar spondylosis M47.816        Subjective   Pt reports no change in pain, she has done her HEP  Pain: Better  Pt has been compliant with HEP Yes      Objective  No objective measures assessed this visit    Assessment:  Pt is just beginning therapy.  No goals met. All exercises were new or carried forward from eval.   The pt required min to mod cues and demonstration to complete exercises with proper form.    Pt responded to all activities without adverse effects.    Pt continues to lack strength necessary for the completion of baseline ADLs without pain.  Pt will benefit from further therapy to continue to progress towards meeting functional and impairment goals.     Plan:  Continue to progress treatment to improve strength, range of motion, joint mobility, pain, and flexibility impairments which are impacting patient's function.    Treatments:  Visit # 2    Eval date:11/11/24  Re-check due on:  Auth:MN  Precautions: None  Therapeutic ex:  Stepper 5 min  IB, HS and psoas stretch 1 min ea  Shuttle DL 5 bands, SL 4 bands  Standing hip abd and ext RTB x 20  Supine TR and SKTC x 2 min  TA braacing with hip abd and add 5sec x 2 min ea  Access Code: [email or web address]  URL: [email or web address]  Date: 11/11/2024  Prepared by: Silas Handley  Neuro Re-ed:    Manual: NONE TODAY  Pt educated regarding manual therapy risks and benefits.  Pt given opportunity to stop if needed.  Pt aware and agrees.      Goals:  Active       PT Problem       Pt will have pain no higher than 2/10 for at least 2 weeks        Start:  11/11/24    Expected End:  02/09/25            Pt will have strength of at least 4/5 of B LE to allow the pt to amb without difficulty        Start:  11/11/24    Expected End:  02/09/25            pt will report improved symptoms of urinary incontinence       Start:  11/11/24    Expected End:  02/09/25            ELODIA to 10% or better       Start:  11/11/24    Expected End:  02/09/25

## 2024-11-25 ENCOUNTER — TREATMENT (OUTPATIENT)
Dept: PHYSICAL THERAPY | Facility: CLINIC | Age: 74
End: 2024-11-25
Payer: MEDICARE

## 2024-11-25 DIAGNOSIS — M54.50 LOW BACK PAIN: ICD-10-CM

## 2024-11-25 DIAGNOSIS — M47.816 LUMBAR SPONDYLOSIS: ICD-10-CM

## 2024-11-25 PROCEDURE — 97110 THERAPEUTIC EXERCISES: CPT | Mod: GP | Performed by: PHYSICAL THERAPIST

## 2024-11-25 NOTE — PROGRESS NOTES
Physical Therapy    Physical Therapy Treatment    Patient Name: Shanika Knowles  MRN: [id number]  Today's Date: 11/25/2024  Visit #3  Time Calculation  Start Time: 1016  Stop Time: 1058  Time Calculation (min): 42 min     PT Therapeutic Procedures Time Entry  Therapeutic Exercise Time Entry: 42        Payor: MEDICARE / Plan: MEDICARE PART A AND B / Product Type: *No Product type* /   Referred by:Sangita Telles  Current Problem  Problem List Items Addressed This Visit             ICD-10-CM    Low back pain M54.50    Lumbar spondylosis M47.816        Subjective   Pt reports she is in so much pain in her knee from the stepper last visit and she does not want to do it.   Pain: No change  Pt has been compliant with HEP YES    Objective  No objective measures assessed this visit    Assessment:  Pt is progressing as expected towards goals. Discussed why the leg press at the gym may cause more pain in her knees.   This session exercises were progressed (p) or added (NEW) as documented in the treatment section.  The pt required min to mod cues and demonstration to complete exercises with proper form.    Pt responded to all activities without adverse effects.    Pt continues to lack strength necessary for the completion of baseline ADLs without pain.  Pt will benefit from further therapy to continue to progress towards meeting functional and impairment goals.    Plan:  Continue to progress treatment to improve strength, range of motion, joint mobility, pain, and flexibility impairments which are impacting patient's function.    Treatments:  Visit # 3    Eval date:11/11/24  Re-check due on:  Auth:MN  Precautions: None  Therapeutic ex:  Stepper 5 min, pt declined due to knee pain  IB, HS and psoas stretch 1 min ea  Shuttle DL 5 bands, SL 4 bands, HELD pt declined states she will do it at the gym today  Standing hip flex, abd and ext RTB x 20  Supine TR and SKTC x 2 min  TA bracing with hip abd and add 5sec x 2 min ea  Access  Code: [email or web address]  URL: [email or web address]  Date: 11/11/2024  Prepared by: Silas Handley  Neuro Re-ed:     Manual: NONE TODAY  Pt educated regarding manual therapy risks and benefits.  Pt given opportunity to stop if needed.  Pt aware and agrees.   Goals:  Active       PT Problem       Pt will have pain no higher than 2/10 for at least 2 weeks        Start:  11/11/24    Expected End:  02/09/25            Pt will have strength of at least 4/5 of B LE to allow the pt to amb without difficulty        Start:  11/11/24    Expected End:  02/09/25            pt will report improved symptoms of urinary incontinence       Start:  11/11/24    Expected End:  02/09/25            ELODIA to 10% or better       Start:  11/11/24    Expected End:  02/09/25

## 2024-12-04 ENCOUNTER — TREATMENT (OUTPATIENT)
Dept: PHYSICAL THERAPY | Facility: CLINIC | Age: 74
End: 2024-12-04
Payer: MEDICARE

## 2024-12-04 DIAGNOSIS — M54.50 LOW BACK PAIN: ICD-10-CM

## 2024-12-04 DIAGNOSIS — M47.816 LUMBAR SPONDYLOSIS: ICD-10-CM

## 2024-12-04 PROCEDURE — 97110 THERAPEUTIC EXERCISES: CPT | Mod: GP | Performed by: PHYSICAL THERAPIST

## 2024-12-04 NOTE — PROGRESS NOTES
Physical Therapy    Physical Therapy Treatment    Patient Name: Shanika Knowles  MRN: [id number]  Today's Date: 12/4/2024  Visit #4  Time Calculation  Start Time: 1018  Stop Time: 1100  Time Calculation (min): 42 min     PT Therapeutic Procedures Time Entry  Therapeutic Exercise Time Entry: 42        Payor: MEDICARE / Plan: MEDICARE PART A AND B / Product Type: *No Product type* /   Referred by:Sangita Telles  Current Problem  Problem List Items Addressed This Visit             ICD-10-CM    Low back pain M54.50    Lumbar spondylosis M47.816        Subjective   Pt reports she still gets pain in her low back and into the right buttock.  Reports she is a little better  Pain: Better  Pt has been compliant with HEP Yes      Objective  No objective measures assessed this visit    Assessment:  Pt is progressing slowly towards goals. Pt needs frequent Vcs throughout treatment to complete exercises correctly  This session exercises were progressed (p) or added (NEW) as documented in the treatment section.  The pt required min to mod cues and demonstration to complete exercises with proper form.    Pt responded to all activities without adverse effects.    Pt continues to lack flexibility necessary for the completion of baseline ADLs without pain.  Pt will benefit from further therapy to continue to progress towards meeting functional and impairment goals.    Plan:  Continue to progress treatment to improve strength, range of motion, joint mobility, pain, and flexibility impairments which are impacting patient's function.    Treatments:  Visit # 4    Eval date:11/11/24  Re-check due on:  Auth:MN  Precautions: None  Therapeutic ex:  Stepper 5 min,   IB, HS and psoas stretch 1 min ea  Shuttle DL 5 bands, SL 4 bands, Standing hip flex, abd and ext RTB x 20  Standing hip ext and bad YTB x 20  Supine TR and SKTC x 2 min  TA bracing with hip abd and add 5sec x 2 min ea  SLR x 1 min ea  Clamshells x 1 min ea  **NV add postural  training  Access Code: [email or web address]  URL: [email or web address]  Date: 11/11/2024  Prepared by: Silas Handley  Neuro Re-ed:     Manual: NONE TODAY  Pt educated regarding manual therapy risks and benefits.  Pt given opportunity to stop if needed.  Pt aware and agrees.   Goals:  Active       PT Problem       Pt will have pain no higher than 2/10 for at least 2 weeks        Start:  11/11/24    Expected End:  02/09/25            Pt will have strength of at least 4/5 of B LE to allow the pt to amb without difficulty        Start:  11/11/24    Expected End:  02/09/25            pt will report improved symptoms of urinary incontinence       Start:  11/11/24    Expected End:  02/09/25            ELODIA to 10% or better       Start:  11/11/24    Expected End:  02/09/25

## 2024-12-10 ENCOUNTER — APPOINTMENT (OUTPATIENT)
Dept: PHARMACY | Facility: HOSPITAL | Age: 74
End: 2024-12-10
Payer: MEDICARE

## 2024-12-10 DIAGNOSIS — Z79.4 TYPE 2 DIABETES MELLITUS WITH DIABETIC NEPHROPATHY, WITH LONG-TERM CURRENT USE OF INSULIN: ICD-10-CM

## 2024-12-10 DIAGNOSIS — E11.65 UNCONTROLLED TYPE 2 DIABETES MELLITUS WITH HYPERGLYCEMIA: ICD-10-CM

## 2024-12-10 DIAGNOSIS — Z79.4 TYPE 2 DIABETES MELLITUS WITH HYPERGLYCEMIA, WITH LONG-TERM CURRENT USE OF INSULIN: ICD-10-CM

## 2024-12-10 DIAGNOSIS — E11.65 TYPE 2 DIABETES MELLITUS WITH HYPERGLYCEMIA, WITH LONG-TERM CURRENT USE OF INSULIN: ICD-10-CM

## 2024-12-10 DIAGNOSIS — E11.21 TYPE 2 DIABETES MELLITUS WITH DIABETIC NEPHROPATHY, WITH LONG-TERM CURRENT USE OF INSULIN: ICD-10-CM

## 2024-12-10 RX ORDER — INSULIN GLARGINE 100 [IU]/ML
50 INJECTION, SOLUTION SUBCUTANEOUS DAILY
Qty: 45 ML | Refills: 2 | Status: SHIPPED | OUTPATIENT
Start: 2024-12-10 | End: 2025-12-10

## 2024-12-10 RX ORDER — INSULIN LISPRO 200 [IU]/ML
INJECTION, SOLUTION SUBCUTANEOUS
Qty: 9 ML | Refills: 3 | Status: SHIPPED | OUTPATIENT
Start: 2024-12-10

## 2024-12-10 RX ORDER — PEN NEEDLE, DIABETIC 30 GX3/16"
1 NEEDLE, DISPOSABLE MISCELLANEOUS 4 TIMES DAILY
Qty: 400 EACH | Refills: 3 | Status: SHIPPED | OUTPATIENT
Start: 2024-12-10

## 2024-12-10 NOTE — PROGRESS NOTES
Subjective   Patient ID: Shanika Knowles is a 74 y.o. female who presents for Diabetes.    Referring Provider: Sangita Telles,      Diabetes  She presents for her follow-up diabetic visit. She has type 2 diabetes mellitus. Her disease course has been worsening. There are no hypoglycemic associated symptoms. There are no hypoglycemic complications. Required assistance: Patient drinks a coke to help with hypoglycemic episodes. Her weight is increasing steadily. She is following a generally unhealthy diet. Her home blood glucose trend is fluctuating dramatically.     In the new year Levemir will no longer be available and patient will have to switch to Lantus.  At this time, will send in a new prescription to transition     Type II Diabetes  Current  Pharmacotherapy:   Humalog 18 units TID with meals   Levemir 60 units in the morning   Mounjaro 2.5 mg once weekly injection      HISTORICAL PHARMACOTHERAPY  - Ozempic caused joint and muscle pain  - Jardiance caused decrease in kidney function  - Metformin caused severe migraines  - Januvia caused headaches   - Pioglitazone caused pain  - exenatide cause headaches  - Trulicity - severe muscle aches and joint pain     SECONDARY PREVENTION  - Statin? No   - ACE-I/ARB? Yes   - Aspirin? Yes     -The 10-year ASCVD risk score (Autumn BALDERAS, et al., 2019) is: 28.7%    Values used to calculate the score:      Age: 74 years      Sex: Female      Is Non- : No      Diabetic: Yes      Tobacco smoker: No      Systolic Blood Pressure: 110 mmHg      Is BP treated: Yes      HDL Cholesterol: 54.4 mg/dL      Total Cholesterol: 210 mg/dL      Current monitoring regimen:   Patient is using: glucometer  - Tried a CGM in the past but had trouble with adhesion and prefers glucometer.     SMBG Fasting Readings: mostly around 150 average     Hypoglycemia?   - reports some reading in the 60- 70 about 6 times over the last few weeks specifically before bed and overnight.       Pertinent PMH Review:  - PMH of Pancreatitis: No  - PMH/FH of Medullary Thyroid Cancer: No  - PMH of Retinopathy: No  - PMH of Urinary Tract Infections: No     Diet/Lifestyle:   She has cut out almost all sugar and carbs       Review of Systems    Objective     There were no vitals taken for this visit.     Labs  Lab Results   Component Value Date    BILITOT 1.6 (H) 09/25/2023    CALCIUM 9.7 07/15/2024    CO2 25 07/15/2024     07/15/2024    CREATININE 0.92 07/15/2024    GLUCOSE 222 (H) 07/15/2024    ALKPHOS 74 09/25/2023    K 4.2 07/15/2024    PROT 7.2 09/25/2023     07/15/2024    AST 33 09/25/2023    ALT 27 09/25/2023    BUN 18 07/15/2024    ANIONGAP 11 07/15/2024    ALBUMIN 4.2 09/25/2023    GFRF 55 (A) 09/25/2023     Lab Results   Component Value Date    TRIG 164 (H) 07/15/2024    CHOL 210 (H) 07/15/2024    LDLCALC 123 (H) 07/15/2024    HDL 54.4 07/15/2024     Lab Results   Component Value Date    HGBA1C 9.8 (A) 10/29/2024       Current Outpatient Medications on File Prior to Visit   Medication Sig Dispense Refill    albuterol 90 mcg/actuation inhaler Inhale 1-2 puffs every 4 hours if needed.      aspirin 81 mg chewable tablet once every 24 hours.      cholecalciferol, vitamin D3, (VITAMIN D3 ORAL) Take by mouth.      clotrimazole-betamethasone (Lotrisone) cream Apply thin layer on outer ear skin and open of the canal twice daily 2 days/week and use external hand cream on other days. 45 g 1    hydrocortisone-acetic acid (Vosol-HC) otic solution 4 3 drops drops twice daily on 2 days/week for dryness and irritation.  Can use baby oil or sweet oil on other days as needed for itching 10 mL 1    lancets (OneTouch Delica Lancets) 33 gauge misc Use to test blood glucose three times daily 300 each 3    losartan (Cozaar) 25 mg tablet Take 1 tablet (25 mg) by mouth once daily. 90 tablet 0    montelukast (Singulair) 10 mg tablet Take 1 tablet (10 mg) by mouth once daily at bedtime. 90 tablet 0    NON  "FORMULARY NEUROVITE      omega-3/dha/epa/dpa/fish oil (OMEGA-3 2100 ORAL) Take by mouth.      OneTouch Ultra Test strip Use to test blood glucose three times daily 300 strip 3    salmeterol (Serevent Diskus) 50 mcg/dose diskus inhaler Inhale 1 puff every 12 hours. 60 each 2    Synthroid 112 mcg tablet Take 1 tablet (112 mcg) by mouth once daily. 90 tablet 3    tirzepatide (Mounjaro) 2.5 mg/0.5 mL pen injector Inject 2.5 mg under the skin 1 (one) time per week. 2 mL 3    tiZANidine (Zanaflex) 2 mg tablet Take 1 tablet (2 mg) by mouth every 6 hours if needed for muscle spasms for up to 20 doses. 10 tablet 1    [DISCONTINUED] HumaLOG KwikPen Insulin 200 unit/mL (3 mL) insulin pen pen ADMINISTER 18 units UNITS UNDER THE SKIN  with breakfast and lunch, administer 16 units under the skin with dinner. 9 mL 3    [DISCONTINUED] insulin detemir (Levemir FlexPen) 100 unit/mL (3 mL) pen Inject 50 units in the morning 15 mL 1    [DISCONTINUED] pen needle, diabetic (BD Melvi 2nd Gen Pen Needle) 32 gauge x 5/32\" needle 1 Needle 4 times a day. 400 each 3     No current facility-administered medications on file prior to visit.       Assessment/Plan   Problem List Items Addressed This Visit       Type 2 diabetes mellitus, with long-term current use of insulin     Patient currently uncontrolled with A1c of 9.2% (goal of <8%). Since last visit the patient started Mounjaro and reported some nausea and indigestion but it is tolerable at this point.  She does report some low blood sugars in the 60's at bedtime and overnight.  Patient would like to remain on current dose of Mounjaro, but did discuss decreasing Levemir to prevent further hypoglycemic episodes.      PLAN:  - Continue Mounjaro 2.5 mg once weekly injection.    - Continue Humalog 18 units with meals  - Switch to Lantus and decrease to 50 units once daily   - Continue to test blood sugar levels and watch for signs/symptoms of hypoglycemia.  Instructed patient to call if having " "hypoglycemic events.  Patient verbalized understanding.      Follow up: 4 weeks          Relevant Medications    HumaLOG KwikPen Insulin 200 unit/mL (3 mL) insulin pen pen    insulin glargine (Lantus Solostar U-100 Insulin) 100 unit/mL (3 mL) pen     Other Visit Diagnoses       Uncontrolled type 2 diabetes mellitus with hyperglycemia        Relevant Medications    pen needle, diabetic (BD Melvi 2nd Gen Pen Needle) 32 gauge x 5/32\" Mykel CardonaD    Continue all meds under the continuation of care with the referring provider and clinical pharmacy team.         "

## 2024-12-10 NOTE — ASSESSMENT & PLAN NOTE
Patient currently uncontrolled with A1c of 9.2% (goal of <8%). Since last visit the patient started Mounjaro and reported some nausea and indigestion but it is tolerable at this point.  She does report some low blood sugars in the 60's at bedtime and overnight.  Patient would like to remain on current dose of Mounjaro, but did discuss decreasing Levemir to prevent further hypoglycemic episodes.      PLAN:  - Continue Mounjaro 2.5 mg once weekly injection.    - Continue Humalog 18 units with meals  - Switch to Lantus and decrease to 50 units once daily   - Continue to test blood sugar levels and watch for signs/symptoms of hypoglycemia.  Instructed patient to call if having hypoglycemic events.  Patient verbalized understanding.      Follow up: 4 weeks

## 2024-12-11 ENCOUNTER — TREATMENT (OUTPATIENT)
Dept: PHYSICAL THERAPY | Facility: CLINIC | Age: 74
End: 2024-12-11
Payer: MEDICARE

## 2024-12-11 DIAGNOSIS — M54.50 LOW BACK PAIN: ICD-10-CM

## 2024-12-11 DIAGNOSIS — M47.816 LUMBAR SPONDYLOSIS: ICD-10-CM

## 2024-12-11 PROCEDURE — 97110 THERAPEUTIC EXERCISES: CPT | Mod: GP | Performed by: PHYSICAL THERAPIST

## 2024-12-11 NOTE — PROGRESS NOTES
Physical Therapy    Physical Therapy Treatment    Patient Name: Shanika Knowles  MRN: [id number]  Today's Date: 12/11/2024  Visit #5  Time Calculation  Start Time: 0845  Stop Time: 0927  Time Calculation (min): 42 min     PT Therapeutic Procedures Time Entry  Therapeutic Exercise Time Entry: 42        Payor: MEDICARE / Plan: MEDICARE PART A AND B / Product Type: *No Product type* /   Referred by:Sangita Telles  Current Problem  Problem List Items Addressed This Visit             ICD-10-CM    Low back pain M54.50    Lumbar spondylosis M47.816        Subjective   Pt reports she went for a walk and her hip felt like it was going to fall out.   Pain: No change  Pt has been compliant with HEP No      Objective  No objective measures assessed this visit    Assessment:  Pt is progressing as expected towards goals. Pt reports feeling weak in the R hip but the area of concern is consistent with IT band tendonitis.  Will continues to work on hip strength as pt agrees  This session exercises were progressed (p) or added (NEW) as documented in the treatment section.  The pt required min to mod cues and demonstration to complete exercises with proper form.    Pt responded to all activities without adverse effects.    Pt continues to lack strength necessary for the completion of baseline ADLs without pain.  Pt will benefit from further therapy to continue to progress towards meeting functional and impairment goals.    Plan:  Continue to progress treatment to improve strength, range of motion, joint mobility, pain, and flexibility impairments which are impacting patient's function.    Treatments:  Visit # 5    Eval date:11/11/24  Re-check due on:  Auth:MN  Precautions: None  Therapeutic ex:  Stepper 5 min, HELD, pt request  IB, HS and psoas stretch 1 min ea  Shuttle DL 5 bands, SL 4 bands,   Standing hip flex, abd and ext RTB x 20  Standing mid rows and pulldowns GTB x 20  Anti rotation press GTB x 20  B SH ER and HORZ ABD YTB x  20  Seated thor ext over half roll x 20  Seated ball roll 3 min FWD, 3 min LAT  Seated hip abd and add 2 min ea  HELD  Supine TR and SKTC x 2 min  TA bracing with hip abd and add 5sec x 2 min ea  SLR x 1 min ea  Clamshells x 1 min ea  Updated HEP and given bands  Access Code: [email or web address]  URL: [email or web address]  Date: 11/11/2024  Prepared by: Silas Handley     Manual: NONE TODAY  Pt educated regarding manual therapy risks and benefits.  Pt given opportunity to stop if needed.  Pt aware and agrees.   Goals:  Active       PT Problem       Pt will have pain no higher than 2/10 for at least 2 weeks        Start:  11/11/24    Expected End:  02/09/25            Pt will have strength of at least 4/5 of B LE to allow the pt to amb without difficulty        Start:  11/11/24    Expected End:  02/09/25            pt will report improved symptoms of urinary incontinence       Start:  11/11/24    Expected End:  02/09/25            ELODIA to 10% or better       Start:  11/11/24    Expected End:  02/09/25

## 2024-12-17 ENCOUNTER — APPOINTMENT (OUTPATIENT)
Dept: ORTHOPEDIC SURGERY | Facility: CLINIC | Age: 74
End: 2024-12-17
Payer: MEDICARE

## 2024-12-17 DIAGNOSIS — M62.830 SPASM OF MUSCLE OF LOWER BACK: ICD-10-CM

## 2024-12-17 PROCEDURE — 4010F ACE/ARB THERAPY RXD/TAKEN: CPT | Performed by: PHYSICAL MEDICINE & REHABILITATION

## 2024-12-17 PROCEDURE — 99213 OFFICE O/P EST LOW 20 MIN: CPT | Performed by: PHYSICAL MEDICINE & REHABILITATION

## 2024-12-17 PROCEDURE — 1123F ACP DISCUSS/DSCN MKR DOCD: CPT | Performed by: PHYSICAL MEDICINE & REHABILITATION

## 2024-12-17 PROCEDURE — 3049F LDL-C 100-129 MG/DL: CPT | Performed by: PHYSICAL MEDICINE & REHABILITATION

## 2024-12-17 PROCEDURE — 3061F NEG MICROALBUMINURIA REV: CPT | Performed by: PHYSICAL MEDICINE & REHABILITATION

## 2024-12-17 PROCEDURE — 1159F MED LIST DOCD IN RCRD: CPT | Performed by: PHYSICAL MEDICINE & REHABILITATION

## 2024-12-17 PROCEDURE — 1160F RVW MEDS BY RX/DR IN RCRD: CPT | Performed by: PHYSICAL MEDICINE & REHABILITATION

## 2024-12-17 PROCEDURE — 1036F TOBACCO NON-USER: CPT | Performed by: PHYSICAL MEDICINE & REHABILITATION

## 2024-12-17 PROCEDURE — 3046F HEMOGLOBIN A1C LEVEL >9.0%: CPT | Performed by: PHYSICAL MEDICINE & REHABILITATION

## 2024-12-17 RX ORDER — TIZANIDINE 2 MG/1
2 TABLET ORAL EVERY 6 HOURS PRN
Qty: 10 TABLET | Refills: 1 | Status: SHIPPED | OUTPATIENT
Start: 2024-12-17

## 2024-12-17 SDOH — SOCIAL STABILITY: SOCIAL NETWORK: SOCIAL ACTIVITY:: 3

## 2024-12-17 NOTE — PROGRESS NOTES
New Consult/New Patient Note    12/17/2024   No ref. provider found    Assessment: Pleasant 74-year-old female with chronic primarily right lower back pain.  Also intermittent thoracic and right hip pain.  Intermittent symptoms that radiate down the right leg.   -Suspect right lumbar radiculitis with component of facet arthropathy  -Significant component of myofascial spasm.  Thoracic spondylosis  -Possible right intra-articular hip pathology/osteoarthritis    12/17/24 update: Doing well, still has spasms in back and needs tizanidine refill. Discussed advancing exercise as tolerated.  Overall very happy with her progress.  Follow-up with us on an as-needed basis    PLAN:  1)  Imaging/Diagnostic Studies: Lumbar and thoracic XR reviewed and interpreted showing diffuse mild levoscoliosis and degenerative changes throughout with a  anterior spurring and facet arthritis most notable at  L4-5 and L5-S1 levels.  2)  Therapy/Rehabilitation: Currently in and continues her home exercise program  3)  Pharmacological Management: Agree with tizanidine as needed-advised potential sedate of side effects  4)  Spine/Surgical Interventions: None at this time  5)  Alternative Treatments: May consider alternative treatment options in the future including manipulation (chiropractor versus osteopathic) and/or acupuncture if patient does not obtain optimal relief with initial treatment plan.  6)  Consultations: Physical therapy  7)  Follow -up: 4-6 weeks or PRN if symptoms worsen/do not improve.   8)  Future treatment considerations: Lumbar MRI.  Right hip x-ray    Patient advised of the difference between hurt and harm and advised to continue with all normal activities and exercises. Patient verbalized understanding of the above plan and was happy with the care provided.      The above clinical summary has been dictated with voice recognition software. It has not been proofread for grammatical errors, typographical mistakes, or other  semantic inconsistencies.    Thank you for visiting our office today. It was our pleasure to take part in your healthcare.     Do not hesitate to call with any questions regarding your plan of care after leaving at [phone]    To clinicians, thank you very much for this kind referral. It is a privilege to partner with you in the care of your patients. My office would be delighted to assist you with any further consultations or with questions regarding the plan of care outlined. Do not hesitate to call the office or contact me directly.     Seen with resident Dr. Dominguez, note above and below is a joint effort in all areas.    I saw and evaluated the patient. I personally obtained the key and critical portions of the history and physical exam. I reviewed the resident's documentation and discussed the patient with the resident. I agree with the resident's medical decision making as documented in the resident's note, with my additions.      Sincerely,    ISIS Gonzalez MD  , Physical Medicine and Rehabilitation, Orthopedic Spine  [facility]  [facility]         Shanika Knowles   is a 74 y.o. female last seen 10/22/24 who presents with gait concerns and lower back pain that she feels began after lifting a heavy planter in June.  Since her last visit she is doing much better, low back pain has decreased but right buttock/hip pain is still there, rated 3/10. She still notes some paraspinal tightness.    Location:  Right lower back, at the pantline   Radiation:  right leg, posterior thigh and lateral thigh, to the foot and ankle somewhat diffusely - resolved.  No worsening numbness or weakness. Intermittent tingling but has largely resolved.  Quality: achy     current 3/10 (from 5/10),  at its worst  4-5/10 (from 9/10)  Exacerbated by stretching, yoga  Relieved by rest, muscle relaxer  Onset, traumatic event: no recent, lifted a pot  in June of this year  Has tried:  tylenol, tizanidine     Valsalva sign is pos  Grocery cart sign is pos  Smoker:  no  Does wake them at night  Litigation: none    Patient denies bowel/bladder incontinence, denies fever, denies unintentional weight loss, denies clumsiness of hands, feet, or dropping things.  Denies any constitutional or myelopathic symptomatology.      PREVIOUS TREATMENTS  IN THE LAST SIX MONTHS     Active conservative therapy  in the last six months (see below)              1. Physical therapy:  yes                                                                             2. Home exercise program after PT: yes                                                   3. A physician supervised home exercise program (HEP):n no                 4. Chiropractic Care: no                                                                   Passive conservative therapy  in the last six months (see below)              1. NSAIDS: avoiding                                                                                                        2. Prescription pain medication:    tizanidine                                                          3. Acupuncture:   no                                                                                          4. Tens unit: no     Assistive Devices: Cane as needed    Work status: retired       ROS: Other than listed in HPI, PMHX below, and intake paperwork including a 30 point patient-recorded review of symptoms which was personally reviewed and inclusive of no history of unintentional weight loss, change in appetite, significant malaise, fevers, chills, or change in bowel/bladder, shortness of breath, or chest pain.    I have confirmed and edited as necessary Past Medical, Past Surgical, Family, Social History and ROS as obtained by others. These were also obtained on new patient forms.      PHYSICAL EXAM:   GENERAL APPEARANCE:  Well nourished, well developed, and no apparent  distress. - unchanged  NEURO PSYCH: Patient oriented to person, place, Mood pleasant. Benign affect. - unchanged  MUSCULOSKELETAL and NEUROLOGICAL - largely unchanged form previous exam      VISUAL INSPECTION            LUMBAR: WNL - unchanged  SPINE ROM:   LUMBAR ROM: mildly limited      PALPATION:           SPINOUS PROCESS: Nontender midline lumbar - unchanged           PARASPINALS: Tenderness right lower lumbar, PSIS, and glut med, nontender left.  FACET LOADING: Positive right lower lumbar - unchanged  MUSCLE BULK: Normal and symmetrical in the upper & lower extremities. - unchanged  MUSCLE TONE: Normal - unchanged  MOTOR: 5/5 in all muscle groups tested in bilateral lower extremities  - unchanged  SENSORY: Normal sensory exam to light touch in the lower extremities - unchanged  GAIT:  Able to go up on heels and toes with no significant weakness, no longer requires cane  Slump testing: Negative - unchanged    DATA REVIEW:   The below imaging studies were personally reviewed and discussed with the patient.    Medical Decision Making:  The above note constitutes a Moderate to High level of medical decision making based on past data and imaging review, new and chronic symptoms with exacerbation, change in weakness or sensation, new imaging and diagnostic studies ordered, discussion of potential interventional or surgical treatment options, acute or chronic pain that may pose a threat to bodily function.    Past Medical History:   Diagnosis Date    Class 2 obesity with body mass index (BMI) of 35.0 to 35.9 in adult 06/19/2023    Encounter for gynecological examination (general) (routine) without abnormal findings 11/01/2018    Well female exam with routine gynecological exam    Personal history of other drug therapy     COVID-19 vaccine series completed    Personal history of other endocrine, nutritional and metabolic disease     History of hypothyroidism    Personal history of other infectious and parasitic  diseases     History of chickenpox    Stage 3 chronic kidney disease (Multi) 04/04/2024    Type 1 diabetes mellitus without complications     Type 1 diabetes mellitus without complication       Medication Documentation Review Audit       Reviewed by Sangita Telles DO (Physician) on 10/29/24 at 0821      Medication Order Taking? Sig Documenting Provider Last Dose Status   albuterol 90 mcg/actuation inhaler [id number] Yes Inhale 1-2 puffs every 4 hours if needed. Historical Provider, MD  Active   aspirin 81 mg chewable tablet [id number] Yes once every 24 hours. Historical Provider, MD  Active   cholecalciferol, vitamin D3, (VITAMIN D3 ORAL) [id number] Yes Take by mouth. Historical Provider, MD  Active   clotrimazole-betamethasone (Lotrisone) cream [id number] Yes Apply thin layer on outer ear skin and open of the canal twice daily 2 days/week and use external hand cream on other days. Aquiles Knapp DMD, MD  Active   HumaLOG KwikPen Insulin 200 unit/mL (3 mL) insulin pen pen [id number] Yes ADMINISTER 18 units UNITS UNDER THE SKIN  with breakfast and lunch, administer 16 units under the skin with dinner. Sangita Telles DO  Active   hydrocortisone-acetic acid (Vosol-HC) otic solution [id number] Yes 4 3 drops drops twice daily on 2 days/week for dryness and irritation.  Can use baby oil or sweet oil on other days as needed for itching Aquiles Knapp DMD, MD  Active   insulin detemir (Levemir FlexPen) 100 unit/mL (3 mL) pen [id number] Yes Inject 50 units in the morning Sangita Telles DO  Active   lancets (OneTouch Delica Lancets) 33 gauge misc [id number] Yes Use to test blood glucose three times daily Sangita Telles DO  Active   losartan (Cozaar) 25 mg tablet [id number] Yes Take 1 tablet (25 mg) by mouth once daily. Gerardo Branch MD  Active   montelukast (Singulair) 10 mg tablet [id number] Yes Take 1 tablet (10 mg) by mouth once daily at bedtime. Greardo Branch MD  Active   NON FORMULARY [id number]  "Yes NEUROVITE Historical Provider, MD  Active   omega-3/dha/epa/dpa/fish oil (OMEGA-3 2100 ORAL) [id number] Yes Take by mouth. Historical Provider, MD  Active   OneTouch Ultra Test strip [id number] Yes Use to test blood glucose three times daily Sangita Telles, DO  Active   pen needle, diabetic (BD Melvi 2nd Gen Pen Needle) 32 gauge x /32\" needle [id number] Yes 1 Needle 4 times a day. Sangita Telles DO  Active   salmeterol (Serevent Diskus) 50 mcg/dose diskus inhaler [id number] Yes Inhale 1 puff every 12 hours. Sangita Telles DO  Active   Synthroid 112 mcg tablet [id number] Yes Take 1 tablet (112 mcg) by mouth once daily. Sangita Telles DO  Active    Discontinued 10/29/24 0821   tiZANidine (Zanaflex) 2 mg tablet [id number]  Take 1 tablet (2 mg) by mouth every 6 hours if needed for muscle spasms for up to 20 doses. Sangita Telles, DO  Active                    Allergies   Allergen Reactions    Penicillin V Anaphylaxis    Trulicity [Dulaglutide] Other     MUSCLE PAINS, JOINT PAINS - SEVERE     Bee Pollen Other    Ciprofloxacin Other     GI upset    Empagliflozin Other    Erythromycin Swelling    Exenatide Headache and Other     headache    Metformin Hives     Severe Migraines    Nut - Unspecified Hives    Pioglitazone Other     pain    Semaglutide Other    Shellfish Containing Products Other    Sitagliptin Headache and Other     Headache    Sulfa (Sulfonamide Antibiotics) Other       Social History     Socioeconomic History    Marital status:      Spouse name: Not on file    Number of children: Not on file    Years of education: Not on file    Highest education level: Not on file   Occupational History    Not on file   Tobacco Use    Smoking status: Former     Current packs/day: 0.00     Types: Cigarettes     Quit date:      Years since quittin.9    Smokeless tobacco: Never   Vaping Use    Vaping status: Never Used   Substance and Sexual Activity    Alcohol use: Not Currently "    Drug use: Never    Sexual activity: Not on file   Other Topics Concern    Not on file   Social History Narrative    Not on file     Social Drivers of Health     Financial Resource Strain: Not on file   Food Insecurity: Not on file   Transportation Needs: Not on file   Physical Activity: Not on file   Stress: Not on file   Social Connections: Not on file   Intimate Partner Violence: Not on file   Housing Stability: Not on file       Past Surgical History:   Procedure Laterality Date    DILATION AND CURETTAGE OF UTERUS  11/01/2018    Dilation And Curettage    OTHER SURGICAL HISTORY  11/01/2018    Anal Fistulectomy    TONSILLECTOMY  11/01/2018    Tonsillectomy

## 2024-12-18 ENCOUNTER — TREATMENT (OUTPATIENT)
Dept: PHYSICAL THERAPY | Facility: CLINIC | Age: 74
End: 2024-12-18
Payer: MEDICARE

## 2024-12-18 DIAGNOSIS — M47.816 LUMBAR SPONDYLOSIS: ICD-10-CM

## 2024-12-18 DIAGNOSIS — M54.50 LOW BACK PAIN: ICD-10-CM

## 2024-12-18 PROCEDURE — 97110 THERAPEUTIC EXERCISES: CPT | Mod: GP | Performed by: PHYSICAL THERAPIST

## 2024-12-18 NOTE — PROGRESS NOTES
Physical Therapy    Physical Therapy Treatment and Discharge    Patient Name: Shanika Knolwes  MRN: [id number]  Today's Date: 12/18/2024  Visit #6  Time Calculation  Start Time: 0930  Stop Time: 1010  Time Calculation (min): 40 min     PT Therapeutic Procedures Time Entry  Therapeutic Exercise Time Entry: 40        Payor: MEDICARE / Plan: MEDICARE PART A AND B / Product Type: *No Product type* /   Current Problem  Problem List Items Addressed This Visit             ICD-10-CM    Low back pain M54.50    Lumbar spondylosis M47.816        Subjective   Pt reports she is better but the pain in still in her low back and the right hip.  She still gets muscle spasms  Pain: Better  Pt has been compliant with HEP Yes    Objective:  See goals for updated objective measures and progress since eval  Lower Extremity Strength:  Date: EVAL     12/18/24      Myotome RIGHT LEFT RIGHT LEFT   Hip Flexion L1,2 3+ 4- 3 with p 4   Knee Extension L3 3+ 4- 3+ 4   Knee Flexion S2 4- 4- 4 4   Ankle DF L4 4- 4- 4 4   Great toe Ext L5 4- 4- 44       Lumbar ROM:  Date: EVAL  12/18/24     Percentage     Flexion 75%  90%   Extension 25%  50%       Outcome Measures:  Other Measures  Oswestry Disablity Index (ELODIA): 24%     Assessment:  Pt was referred for low back pain  by Sangita Telles  The pt was initially seen on 11/11/24 and has completed  6 visits. Pt has progressed moderately.  The pt has minimally reduced their pain and has increased strength/ROM . Her objective measures had a non significant increase in function. The pt has completed exercises, balance activities, and functional activities with instruction for correct progression to help achieve their goals.   The goals have progressed but not been met.. Pt has progressed towards independence  and is currently safe to continue with their HEP.     Plan:  The POC is discharged as of today.    Rehab Discharge Reason: Progress plateaued; further improvement possible  Treatments:    Eval  date:11/11/24  Auth:MN  Precautions: None  Therapeutic ex:  Stepper 5 min, HELD, pt request  IB, HS and psoas stretch 1 min ea  Shuttle DL 5 bands, SL 4 bands,   Standing hip flex, abd and ext RTB x 20  Standing mid rows and pulldowns GTB x 20  Anti rotation press GTB x 20  B SH ER and HORZ ABD YTB x 20  Seated thor ext over half roll x 20  Seated ball roll 3 min FWD, 3 min LAT  Seated hip abd and add 2 min ea  HELD  Supine TR and SKTC x 2 min  TA bracing with hip abd and add 5sec x 2 min ea  SLR x 1 min ea  Clamshells x 1 min ea  Updated HEP and given bands  Access Code: [email or web address]  URL: [email or web address]  Date: 11/11/2024  Prepared by: Silas Handley     Manual: NONE TODAY  Pt educated regarding manual therapy risks and benefits.  Pt given opportunity to stop if needed.  Pt aware and agrees.   Goals:  Active       PT Problem       Pt will have pain no higher than 2/10 for at least 2 weeks  (Not Progressing)       Start:  11/11/24    Expected End:  02/09/25         Goal Note       Pain is still rated at 3-6/10              Pt will have strength of at least 4/5 of B LE to allow the pt to amb without difficulty  (Progressing)       Start:  11/11/24    Expected End:  02/09/25            pt will report improved symptoms of urinary incontinence (Not Progressing)       Start:  11/11/24    Expected End:  02/09/25            ELODIA to 10% or better (Progressing)       Start:  11/11/24    Expected End:  02/09/25

## 2024-12-27 DIAGNOSIS — J45.909 ASTHMA, UNSPECIFIED ASTHMA SEVERITY, UNSPECIFIED WHETHER COMPLICATED, UNSPECIFIED WHETHER PERSISTENT (HHS-HCC): Primary | ICD-10-CM

## 2024-12-27 RX ORDER — ALBUTEROL SULFATE 90 UG/1
1-2 INHALANT RESPIRATORY (INHALATION) EVERY 4 HOURS PRN
Qty: 18 G | Refills: 0 | Status: SHIPPED | OUTPATIENT
Start: 2024-12-27

## 2025-01-07 ENCOUNTER — APPOINTMENT (OUTPATIENT)
Dept: PHARMACY | Facility: HOSPITAL | Age: 75
End: 2025-01-07
Payer: MEDICARE

## 2025-01-07 DIAGNOSIS — Z79.4 TYPE 2 DIABETES MELLITUS WITH HYPERGLYCEMIA, WITH LONG-TERM CURRENT USE OF INSULIN: ICD-10-CM

## 2025-01-07 DIAGNOSIS — I10 ESSENTIAL HYPERTENSION: ICD-10-CM

## 2025-01-07 DIAGNOSIS — E11.65 TYPE 2 DIABETES MELLITUS WITH HYPERGLYCEMIA, WITH LONG-TERM CURRENT USE OF INSULIN: ICD-10-CM

## 2025-01-07 RX ORDER — TIRZEPATIDE 2.5 MG/.5ML
2.5 INJECTION, SOLUTION SUBCUTANEOUS WEEKLY
Qty: 2 ML | Refills: 3 | Status: SHIPPED | OUTPATIENT
Start: 2025-01-07

## 2025-01-07 RX ORDER — LOSARTAN POTASSIUM 25 MG/1
25 TABLET ORAL DAILY
Qty: 90 TABLET | Refills: 0 | Status: SHIPPED | OUTPATIENT
Start: 2025-01-07

## 2025-01-07 NOTE — ASSESSMENT & PLAN NOTE
Patient currently uncontrolled with A1c of 9.2% (goal of <8%). Since last visit the patient continued Mounjaro and reported some nausea and indigestion but it is tolerable at this point.  The patient does report some low blood sugars in the 60's at bedtime and overnight despite the decrease in long acting insulin.  Recently patient has not been taking short acting insulin with meals and has been taking just 3 times a day.  Stressed the importance of taking the Humalog with meals to prevent hypoglycemia.  Patient would also like to remain on current dose of Mounjaro.      PLAN:  - Continue Mounjaro 2.5 mg once weekly injection.    - Continue Humalog 18 units WITH MEALS.    - Switch to Lantus and decrease to 50 units once daily   - Continue to test blood sugar levels and watch for signs/symptoms of hypoglycemia.  Instructed patient to call if having hypoglycemic events.  Patient verbalized understanding.      Follow up: 2 weeks

## 2025-01-07 NOTE — PROGRESS NOTES
Subjective   Patient ID: Shanika Knowles is a 74 y.o. female who presents for Diabetes.    Referring Provider: Sangita Telles,      Diabetes  She presents for her follow-up diabetic visit. She has type 2 diabetes mellitus. Her disease course has been worsening. There are no hypoglycemic associated symptoms. There are no hypoglycemic complications. Required assistance: Patient drinks a coke to help with hypoglycemic episodes. Her weight is increasing steadily. She is following a generally unhealthy diet. Her home blood glucose trend is fluctuating dramatically.     Type II Diabetes  Current  Pharmacotherapy:   Humalog 18 units TID with meals   Levemir 50 units in the morning   Mounjaro 2.5 mg once weekly injection      HISTORICAL PHARMACOTHERAPY  - Ozempic caused joint and muscle pain  - Jardiance caused decrease in kidney function  - Metformin caused severe migraines  - Januvia caused headaches   - Pioglitazone caused pain  - exenatide cause headaches  - Trulicity - severe muscle aches and joint pain     SECONDARY PREVENTION  - Statin? No   - ACE-I/ARB? Yes   - Aspirin? Yes     -The 10-year ASCVD risk score (Autumn DK, et al., 2019) is: 28.7%    Values used to calculate the score:      Age: 74 years      Sex: Female      Is Non- : No      Diabetic: Yes      Tobacco smoker: No      Systolic Blood Pressure: 110 mmHg      Is BP treated: Yes      HDL Cholesterol: 54.4 mg/dL      Total Cholesterol: 210 mg/dL      Current monitoring regimen:   Patient is using: glucometer  - Tried a CGM in the past but had trouble with adhesion and prefers glucometer.     SMBG Fasting Readings: mostly around 200 average     Hypoglycemia?   - reports some reading in the 60- 70 about 6 times over the last few weeks specifically before bed and overnight.    - Usually treats episodes with a mini coca cola or recently has tried glucose tablets.     Pertinent PMH Review:  - PMH of Pancreatitis: No  - PMH/FH of  Medullary Thyroid Cancer: No  - PMH of Retinopathy: No  - PMH of Urinary Tract Infections: No     Diet/Lifestyle:   She has cut out almost all sugar and carbs     Review of Systems    Objective     There were no vitals taken for this visit.     Labs  Lab Results   Component Value Date    BILITOT 1.6 (H) 09/25/2023    CALCIUM 9.7 07/15/2024    CO2 25 07/15/2024     07/15/2024    CREATININE 0.92 07/15/2024    GLUCOSE 222 (H) 07/15/2024    ALKPHOS 74 09/25/2023    K 4.2 07/15/2024    PROT 7.2 09/25/2023     07/15/2024    AST 33 09/25/2023    ALT 27 09/25/2023    BUN 18 07/15/2024    ANIONGAP 11 07/15/2024    ALBUMIN 4.2 09/25/2023    GFRF 55 (A) 09/25/2023     Lab Results   Component Value Date    TRIG 164 (H) 07/15/2024    CHOL 210 (H) 07/15/2024    LDLCALC 123 (H) 07/15/2024    HDL 54.4 07/15/2024     Lab Results   Component Value Date    HGBA1C 9.8 (A) 10/29/2024       Current Outpatient Medications on File Prior to Visit   Medication Sig Dispense Refill    albuterol 90 mcg/actuation inhaler Inhale 1-2 puffs every 4 hours if needed for shortness of breath. 18 g 0    aspirin 81 mg chewable tablet once every 24 hours.      cholecalciferol, vitamin D3, (VITAMIN D3 ORAL) Take by mouth.      clotrimazole-betamethasone (Lotrisone) cream Apply thin layer on outer ear skin and open of the canal twice daily 2 days/week and use external hand cream on other days. 45 g 1    HumaLOG KwikPen Insulin 200 unit/mL (3 mL) insulin pen pen ADMINISTER 18 units UNITS UNDER THE SKIN  with breakfast and lunch, administer 16 units under the skin with dinner. 9 mL 3    hydrocortisone-acetic acid (Vosol-HC) otic solution 4 3 drops drops twice daily on 2 days/week for dryness and irritation.  Can use baby oil or sweet oil on other days as needed for itching 10 mL 1    insulin glargine (Lantus Solostar U-100 Insulin) 100 unit/mL (3 mL) pen Inject 50 Units under the skin once daily. Take as directed per insulin instructions. 45 mL 2  "   lancets (OneTouch Delica Lancets) 33 gauge misc Use to test blood glucose three times daily 300 each 3    losartan (Cozaar) 25 mg tablet Take 1 tablet (25 mg) by mouth once daily. 90 tablet 0    montelukast (Singulair) 10 mg tablet Take 1 tablet (10 mg) by mouth once daily at bedtime. 90 tablet 0    NON FORMULARY NEUROVITE      omega-3/dha/epa/dpa/fish oil (OMEGA-3 2100 ORAL) Take by mouth.      OneTouch Ultra Test strip Use to test blood glucose three times daily 300 strip 3    pen needle, diabetic (BD Melvi 2nd Gen Pen Needle) 32 gauge x 5/32\" needle 1 Needle 4 times a day. 400 each 3    salmeterol (Serevent Diskus) 50 mcg/dose diskus inhaler Inhale 1 puff every 12 hours. 60 each 2    Synthroid 112 mcg tablet Take 1 tablet (112 mcg) by mouth once daily. 90 tablet 3    tiZANidine (Zanaflex) 2 mg tablet Take 1 tablet (2 mg) by mouth every 6 hours if needed for muscle spasms for up to 20 doses. 10 tablet 1    [DISCONTINUED] tirzepatide (Mounjaro) 2.5 mg/0.5 mL pen injector Inject 2.5 mg under the skin 1 (one) time per week. 2 mL 3     No current facility-administered medications on file prior to visit.       Assessment/Plan   Problem List Items Addressed This Visit       Type 2 diabetes mellitus, with long-term current use of insulin     Patient currently uncontrolled with A1c of 9.2% (goal of <8%). Since last visit the patient continued Mounjaro and reported some nausea and indigestion but it is tolerable at this point.  The patient does report some low blood sugars in the 60's at bedtime and overnight despite the decrease in long acting insulin.  Recently patient has not been taking short acting insulin with meals and has been taking just 3 times a day.  Stressed the importance of taking the Humalog with meals to prevent hypoglycemia.  Patient would also like to remain on current dose of Mounjaro.      PLAN:  - Continue Mounjaro 2.5 mg once weekly injection.    - Continue Humalog 18 units WITH MEALS.    - Switch " to Lantus and decrease to 50 units once daily   - Continue to test blood sugar levels and watch for signs/symptoms of hypoglycemia.  Instructed patient to call if having hypoglycemic events.  Patient verbalized understanding.      Follow up: 2 weeks          Relevant Medications    tirzepatide (Mounjaro) 2.5 mg/0.5 mL pen injector    Other Relevant Orders    Referral to Clinical Pharmacy     Alanis Stock, PharmD    Continue all meds under the continuation of care with the referring provider and clinical pharmacy team.

## 2025-01-14 DIAGNOSIS — J45.909 ASTHMA, UNSPECIFIED ASTHMA SEVERITY, UNSPECIFIED WHETHER COMPLICATED, UNSPECIFIED WHETHER PERSISTENT (HHS-HCC): ICD-10-CM

## 2025-01-14 RX ORDER — MONTELUKAST SODIUM 10 MG/1
10 TABLET ORAL NIGHTLY
Qty: 90 TABLET | Refills: 0 | Status: SHIPPED | OUTPATIENT
Start: 2025-01-14

## 2025-01-21 ENCOUNTER — TELEMEDICINE (OUTPATIENT)
Dept: PHARMACY | Facility: HOSPITAL | Age: 75
End: 2025-01-21
Payer: MEDICARE

## 2025-01-21 ENCOUNTER — APPOINTMENT (OUTPATIENT)
Dept: PHARMACY | Facility: HOSPITAL | Age: 75
End: 2025-01-21
Payer: MEDICARE

## 2025-01-21 DIAGNOSIS — Z79.4 TYPE 2 DIABETES MELLITUS WITH HYPERGLYCEMIA, WITH LONG-TERM CURRENT USE OF INSULIN: ICD-10-CM

## 2025-01-21 DIAGNOSIS — E11.65 TYPE 2 DIABETES MELLITUS WITH HYPERGLYCEMIA, WITH LONG-TERM CURRENT USE OF INSULIN: ICD-10-CM

## 2025-01-21 NOTE — ASSESSMENT & PLAN NOTE
Patient currently uncontrolled with A1c of 9.2% (goal of <8%). Since last visit the patient continued Mounjaro and reported some nausea and indigestion but it is tolerable at this point.  The patient's blood sugars have significantly improved with no recent hypoglycemia.   Stressed the importance of taking the Humalog with meals to prevent hypoglycemia.  Patient would also like to remain on current dose of Mounjaro.  Will continue current regimen as numbers have improved and appear mostly in range.      PLAN:  - Continue Mounjaro 2.5 mg once weekly injection.    - Continue Humalog 18 units WITH MEALS.    - Switch to Lantus and decrease to 50 units once daily   - Continue to test blood sugar levels and watch for signs/symptoms of hypoglycemia.  Instructed patient to call if having hypoglycemic events.  Patient verbalized understanding.      Follow up: 4 weeks

## 2025-01-21 NOTE — PROGRESS NOTES
Subjective   Patient ID: Shanika Knowles is a 74 y.o. female who presents for Diabetes.    Referring Provider: Sangita Telles,      Diabetes  She presents for her follow-up diabetic visit. She has type 2 diabetes mellitus. Her disease course has been worsening. There are no hypoglycemic associated symptoms. There are no hypoglycemic complications. Required assistance: Patient drinks a coke to help with hypoglycemic episodes. Her weight is increasing steadily. She is following a generally unhealthy diet. Her home blood glucose trend is fluctuating dramatically.     Type II Diabetes  Current  Pharmacotherapy:   Humalog 18 units TID with meals (sometimes 16 with dinner in the evening)    Levemir 50 units in the morning   Mounjaro 2.5 mg once weekly injection      HISTORICAL PHARMACOTHERAPY  - Ozempic caused joint and muscle pain  - Jardiance caused decrease in kidney function  - Metformin caused severe migraines  - Januvia caused headaches   - Pioglitazone caused pain  - exenatide cause headaches  - Trulicity - severe muscle aches and joint pain     SECONDARY PREVENTION  - Statin? No   - ACE-I/ARB? Yes   - Aspirin? Yes     -The 10-year ASCVD risk score (Autumn BALDERAS, et al., 2019) is: 28.7%    Values used to calculate the score:      Age: 74 years      Sex: Female      Is Non- : No      Diabetic: Yes      Tobacco smoker: No      Systolic Blood Pressure: 110 mmHg      Is BP treated: Yes      HDL Cholesterol: 54.4 mg/dL      Total Cholesterol: 210 mg/dL      Current monitoring regimen:   Patient is using: glucometer  - Tried a CGM in the past but had trouble with adhesion and prefers glucometer.     SMBG Fasting Readings:   - 115,133,153,144    Hypoglycemia?   - reports no hypoglycemia over the last 2 weeks  - Usually treats episodes with a mini coca cola or recently has tried glucose tablets.     Pertinent PMH Review:  - PMH of Pancreatitis: No  - PMH/FH of Medullary Thyroid Cancer: No  - PMH  of Retinopathy: No  - PMH of Urinary Tract Infections: No     Diet/Lifestyle:   She has cut out almost all sugar and carbs     Review of Systems    Objective     There were no vitals taken for this visit.     Labs  Lab Results   Component Value Date    BILITOT 1.6 (H) 09/25/2023    CALCIUM 9.7 07/15/2024    CO2 25 07/15/2024     07/15/2024    CREATININE 0.92 07/15/2024    GLUCOSE 222 (H) 07/15/2024    ALKPHOS 74 09/25/2023    K 4.2 07/15/2024    PROT 7.2 09/25/2023     07/15/2024    AST 33 09/25/2023    ALT 27 09/25/2023    BUN 18 07/15/2024    ANIONGAP 11 07/15/2024    ALBUMIN 4.2 09/25/2023    GFRF 55 (A) 09/25/2023     Lab Results   Component Value Date    TRIG 164 (H) 07/15/2024    CHOL 210 (H) 07/15/2024    LDLCALC 123 (H) 07/15/2024    HDL 54.4 07/15/2024     Lab Results   Component Value Date    HGBA1C 9.8 (A) 10/29/2024       Current Outpatient Medications on File Prior to Visit   Medication Sig Dispense Refill    albuterol 90 mcg/actuation inhaler Inhale 1-2 puffs every 4 hours if needed for shortness of breath. 18 g 0    aspirin 81 mg chewable tablet once every 24 hours.      cholecalciferol, vitamin D3, (VITAMIN D3 ORAL) Take by mouth.      clotrimazole-betamethasone (Lotrisone) cream Apply thin layer on outer ear skin and open of the canal twice daily 2 days/week and use external hand cream on other days. 45 g 1    HumaLOG KwikPen Insulin 200 unit/mL (3 mL) insulin pen pen ADMINISTER 18 units UNITS UNDER THE SKIN  with breakfast and lunch, administer 16 units under the skin with dinner. 9 mL 3    hydrocortisone-acetic acid (Vosol-HC) otic solution 4 3 drops drops twice daily on 2 days/week for dryness and irritation.  Can use baby oil or sweet oil on other days as needed for itching 10 mL 1    insulin glargine (Lantus Solostar U-100 Insulin) 100 unit/mL (3 mL) pen Inject 50 Units under the skin once daily. Take as directed per insulin instructions. 45 mL 2    lancets (OneTouch Delica Lancets)  "33 gauge misc Use to test blood glucose three times daily 300 each 3    losartan (Cozaar) 25 mg tablet Take 1 tablet (25 mg) by mouth once daily. 90 tablet 0    montelukast (Singulair) 10 mg tablet Take 1 tablet (10 mg) by mouth once daily at bedtime. 90 tablet 0    NON FORMULARY NEUROVITE      omega-3/dha/epa/dpa/fish oil (OMEGA-3 2100 ORAL) Take by mouth.      OneTouch Ultra Test strip Use to test blood glucose three times daily 300 strip 3    pen needle, diabetic (BD Melvi 2nd Gen Pen Needle) 32 gauge x 5/32\" needle 1 Needle 4 times a day. 400 each 3    salmeterol (Serevent Diskus) 50 mcg/dose diskus inhaler Inhale 1 puff every 12 hours. 60 each 2    Synthroid 112 mcg tablet Take 1 tablet (112 mcg) by mouth once daily. 90 tablet 3    tirzepatide (Mounjaro) 2.5 mg/0.5 mL pen injector Inject 2.5 mg under the skin 1 (one) time per week. 2 mL 3    tiZANidine (Zanaflex) 2 mg tablet Take 1 tablet (2 mg) by mouth every 6 hours if needed for muscle spasms for up to 20 doses. 10 tablet 1     No current facility-administered medications on file prior to visit.       Assessment/Plan   Problem List Items Addressed This Visit       Type 2 diabetes mellitus, with long-term current use of insulin     Patient currently uncontrolled with A1c of 9.2% (goal of <8%). Since last visit the patient continued Mounjaro and reported some nausea and indigestion but it is tolerable at this point.  The patient's blood sugars have significantly improved with no recent hypoglycemia.   Stressed the importance of taking the Humalog with meals to prevent hypoglycemia.  Patient would also like to remain on current dose of Mounjaro.  Will continue current regimen as numbers have improved and appear mostly in range.      PLAN:  - Continue Mounjaro 2.5 mg once weekly injection.    - Continue Humalog 18 units WITH MEALS.    - Switch to Lantus and decrease to 50 units once daily   - Continue to test blood sugar levels and watch for signs/symptoms of " hypoglycemia.  Instructed patient to call if having hypoglycemic events.  Patient verbalized understanding.      Follow up: 4 weeks          Relevant Orders    Referral to Clinical Pharmacy     Alanis Stock PharmD    Continue all meds under the continuation of care with the referring provider and clinical pharmacy team.

## 2025-01-27 ENCOUNTER — APPOINTMENT (OUTPATIENT)
Dept: PRIMARY CARE | Facility: CLINIC | Age: 75
End: 2025-01-27
Payer: MEDICARE

## 2025-02-05 LAB
ANION GAP SERPL CALCULATED.4IONS-SCNC: 8 MMOL/L (CALC) (ref 7–17)
BUN SERPL-MCNC: 18 MG/DL (ref 7–25)
BUN/CREAT SERPL: ABNORMAL (CALC) (ref 6–22)
CALCIUM SERPL-MCNC: 9.9 MG/DL (ref 8.6–10.4)
CHLORIDE SERPL-SCNC: 104 MMOL/L (ref 98–110)
CHOLEST SERPL-MCNC: 212 MG/DL
CHOLEST/HDLC SERPL: 4 (CALC)
CO2 SERPL-SCNC: 26 MMOL/L (ref 20–32)
CREAT SERPL-MCNC: 0.95 MG/DL (ref 0.6–1)
EGFRCR SERPLBLD CKD-EPI 2021: 62 ML/MIN/1.73M2
EST. AVERAGE GLUCOSE BLD GHB EST-MCNC: 220 MG/DL
EST. AVERAGE GLUCOSE BLD GHB EST-SCNC: 12.2 MMOL/L
GLUCOSE SERPL-MCNC: 189 MG/DL (ref 65–99)
HBA1C MFR BLD: 9.3 % OF TOTAL HGB
HDLC SERPL-MCNC: 53 MG/DL
LDLC SERPL CALC-MCNC: 127 MG/DL (CALC)
NONHDLC SERPL-MCNC: 159 MG/DL (CALC)
POTASSIUM SERPL-SCNC: 4.5 MMOL/L (ref 3.5–5.3)
SODIUM SERPL-SCNC: 138 MMOL/L (ref 135–146)
TRIGL SERPL-MCNC: 202 MG/DL
TSH SERPL-ACNC: 0.42 MIU/L (ref 0.4–4.5)

## 2025-02-10 ENCOUNTER — APPOINTMENT (OUTPATIENT)
Dept: PRIMARY CARE | Facility: CLINIC | Age: 75
End: 2025-02-10
Payer: MEDICARE

## 2025-02-10 VITALS
HEART RATE: 76 BPM | RESPIRATION RATE: 14 BRPM | HEIGHT: 66 IN | WEIGHT: 213 LBS | TEMPERATURE: 97.9 F | SYSTOLIC BLOOD PRESSURE: 132 MMHG | BODY MASS INDEX: 34.23 KG/M2 | OXYGEN SATURATION: 96 % | DIASTOLIC BLOOD PRESSURE: 70 MMHG

## 2025-02-10 DIAGNOSIS — J45.909 ASTHMA, UNSPECIFIED ASTHMA SEVERITY, UNSPECIFIED WHETHER COMPLICATED, UNSPECIFIED WHETHER PERSISTENT (HHS-HCC): ICD-10-CM

## 2025-02-10 DIAGNOSIS — E11.21 TYPE 2 DIABETES MELLITUS WITH DIABETIC NEPHROPATHY, WITH LONG-TERM CURRENT USE OF INSULIN: ICD-10-CM

## 2025-02-10 DIAGNOSIS — E78.2 MIXED HYPERLIPIDEMIA: Primary | ICD-10-CM

## 2025-02-10 DIAGNOSIS — Z79.4 TYPE 2 DIABETES MELLITUS WITH DIABETIC NEPHROPATHY, WITH LONG-TERM CURRENT USE OF INSULIN: ICD-10-CM

## 2025-02-10 DIAGNOSIS — I10 ESSENTIAL HYPERTENSION: ICD-10-CM

## 2025-02-10 PROCEDURE — 1160F RVW MEDS BY RX/DR IN RCRD: CPT | Performed by: STUDENT IN AN ORGANIZED HEALTH CARE EDUCATION/TRAINING PROGRAM

## 2025-02-10 PROCEDURE — 3075F SYST BP GE 130 - 139MM HG: CPT | Performed by: STUDENT IN AN ORGANIZED HEALTH CARE EDUCATION/TRAINING PROGRAM

## 2025-02-10 PROCEDURE — 99214 OFFICE O/P EST MOD 30 MIN: CPT | Performed by: STUDENT IN AN ORGANIZED HEALTH CARE EDUCATION/TRAINING PROGRAM

## 2025-02-10 PROCEDURE — G2211 COMPLEX E/M VISIT ADD ON: HCPCS | Performed by: STUDENT IN AN ORGANIZED HEALTH CARE EDUCATION/TRAINING PROGRAM

## 2025-02-10 PROCEDURE — 4010F ACE/ARB THERAPY RXD/TAKEN: CPT | Performed by: STUDENT IN AN ORGANIZED HEALTH CARE EDUCATION/TRAINING PROGRAM

## 2025-02-10 PROCEDURE — 1123F ACP DISCUSS/DSCN MKR DOCD: CPT | Performed by: STUDENT IN AN ORGANIZED HEALTH CARE EDUCATION/TRAINING PROGRAM

## 2025-02-10 PROCEDURE — 1159F MED LIST DOCD IN RCRD: CPT | Performed by: STUDENT IN AN ORGANIZED HEALTH CARE EDUCATION/TRAINING PROGRAM

## 2025-02-10 PROCEDURE — 3078F DIAST BP <80 MM HG: CPT | Performed by: STUDENT IN AN ORGANIZED HEALTH CARE EDUCATION/TRAINING PROGRAM

## 2025-02-10 RX ORDER — ALBUTEROL SULFATE 90 UG/1
2 INHALANT RESPIRATORY (INHALATION) EVERY 4 HOURS PRN
Qty: 18 G | Refills: 11 | Status: SHIPPED | OUTPATIENT
Start: 2025-02-10

## 2025-02-10 RX ORDER — INSULIN LISPRO 200 [IU]/ML
18 INJECTION, SOLUTION SUBCUTANEOUS
Qty: 15 ML | Refills: 1 | Status: SHIPPED | OUTPATIENT
Start: 2025-02-10

## 2025-02-10 RX ORDER — LOSARTAN POTASSIUM 25 MG/1
25 TABLET ORAL DAILY
Qty: 90 TABLET | Refills: 1 | Status: SHIPPED | OUTPATIENT
Start: 2025-02-10

## 2025-02-10 RX ORDER — FLUTICASONE PROPIONATE 50 MCG
2 SPRAY, SUSPENSION (ML) NASAL NIGHTLY
Qty: 16 G | Refills: 2 | Status: SHIPPED | OUTPATIENT
Start: 2025-02-10

## 2025-02-10 RX ORDER — MONTELUKAST SODIUM 10 MG/1
10 TABLET ORAL NIGHTLY
Qty: 90 TABLET | Refills: 3 | Status: SHIPPED | OUTPATIENT
Start: 2025-02-10

## 2025-02-10 RX ORDER — ALBUTEROL SULFATE 0.83 MG/ML
2.5 SOLUTION RESPIRATORY (INHALATION) EVERY 4 HOURS PRN
Qty: 270 ML | Refills: 11 | Status: SHIPPED | OUTPATIENT
Start: 2025-02-10

## 2025-02-10 RX ORDER — SALMETEROL XINAFOATE 50 UG/1
1 POWDER, METERED ORAL; RESPIRATORY (INHALATION) EVERY 12 HOURS
Qty: 60 EACH | Refills: 11 | Status: SHIPPED | OUTPATIENT
Start: 2025-02-10 | End: 2025-02-21 | Stop reason: SDUPTHER

## 2025-02-10 NOTE — PATIENT INSTRUCTIONS
- Eats your protein and fats FIRST, then eat your carbs.   - Goal 90 grams of protein per day.   - Try and stay around 150 grams of carbs total.   - Schedule your albuterol treatments every 4 hours for the next 2 days. Then space to every 4 hours if needed.

## 2025-02-10 NOTE — PROGRESS NOTES
FAMILY MEDICINE  OFFICE VISIT   Shanika Knowles  [id number]  1950    PCP: Jayna Duval DO     Chief Complaint:   Chief Complaint   Patient presents with    Follow-up     3 mo fuv  A1c 02/04/2025: 9.3     SUBJECTIVE     Shanika Knowles is a 74 y.o. English-speaking female with pertinent PMHx of T2DM, who presents to the clinic with complaints of follow-up.    Patient is new to me as PCP, as Dr. JALLOH left the practice in January.     Discuss labs.   - A1c 9.3  - Increased lipids     Was sick all of January, coughing virus.   At a lot of ginger ale, pudding, and fast food.     T2DM   - Just does her checks in the morning, knows she should do it more.   - Taking Humalog at 9a-3p-9p   - Mounjaro is going well for her. All the other GLP1s had nausea, vomiting, and joint pain that was more bothersome than anything.   - Mounjaro having burning in the stomach.   - She only eats 2 meals per day.   - Goal is <8 d/t age and comorbidities.       The following portions of the patient's chart were reviewed in this encounter and updated as appropriate:  Tobacco  Allergies  Meds  Problems  Med Hx  Surg Hx  Fam Hx         Home Medication List:  Current Outpatient Medications   Medication Instructions    albuterol 90 mcg/actuation inhaler 1-2 puffs, inhalation, Every 4 hours PRN    aspirin 81 mg chewable tablet Every 24 hours    cholecalciferol, vitamin D3, (VITAMIN D3 ORAL) Take by mouth.    clotrimazole-betamethasone (Lotrisone) cream Apply thin layer on outer ear skin and open of the canal twice daily 2 days/week and use external hand cream on other days.    HumaLOG KwikPen Insulin 200 unit/mL (3 mL) insulin pen pen ADMINISTER 18 units UNITS UNDER THE SKIN  with breakfast and lunch, administer 16 units under the skin with dinner.    hydrocortisone-acetic acid (Vosol-HC) otic solution 4 3 drops drops twice daily on 2 days/week for dryness and irritation.  Can use baby oil or sweet oil on other days as needed for itching     "lancets (OneTouch Delica Lancets) 33 gauge misc Use to test blood glucose three times daily    Lantus Solostar U-100 Insulin 50 Units, subcutaneous, Daily, Take as directed per insulin instructions.    losartan (COZAAR) 25 mg, oral, Daily    montelukast (SINGULAIR) 10 mg, oral, Nightly    Mounjaro 2.5 mg, subcutaneous, Weekly    NON FORMULARY NEUROVITE    omega-3/dha/epa/dpa/fish oil (OMEGA-3 2100 ORAL) Take by mouth.    OneTouch Ultra Test strip Use to test blood glucose three times daily    pen needle, diabetic (BD Melvi 2nd Gen Pen Needle) 32 gauge x 5/32\" needle 1 Needle, miscellaneous, 4 times daily    salmeterol (Serevent Diskus) 50 mcg/dose diskus inhaler 1 puff, inhalation, Every 12 hours    Synthroid 112 mcg, oral, Daily    tiZANidine (ZANAFLEX) 2 mg, oral, Every 6 hours PRN         OBJECTIVE   /70 (BP Location: Left arm, Patient Position: Sitting, BP Cuff Size: Large adult)   Pulse 76   Temp 36.6 °C (97.9 °F) (Temporal)   Resp 14   Ht 1.676 m (5' 6\")   Wt 96.6 kg (213 lb)   SpO2 96%   BMI 34.38 kg/m²   Vital signs and pulse oximetry reviewed.     Physical Exam  Vitals and nursing note reviewed.   Constitutional:       Appearance: Normal appearance.   HENT:      Head: Normocephalic and atraumatic.      Right Ear: External ear normal.      Left Ear: External ear normal.      Nose: Nose normal. No congestion or rhinorrhea.   Eyes:      General: No scleral icterus.     Conjunctiva/sclera: Conjunctivae normal.   Cardiovascular:      Rate and Rhythm: Normal rate and regular rhythm.      Heart sounds: No murmur heard.  Pulmonary:      Effort: Pulmonary effort is normal. No respiratory distress.      Breath sounds: Normal breath sounds. No wheezing, rhonchi or rales.   Musculoskeletal:      Right lower leg: No edema.      Left lower leg: No edema.   Skin:     General: Skin is warm.      Coloration: Skin is not jaundiced.   Neurological:      Mental Status: She is alert. Mental status is at baseline. "   Psychiatric:         Mood and Affect: Mood normal.         Behavior: Behavior normal.         ASSESSMENT & PLAN     Problem List Items Addressed This Visit       Asthma    Current Assessment & Plan     Patient with history of asthma, no evidence of exacerbation today.  -We will refill her albuterol as needed inhaler as well as her nebulizer solution.  -Will refill her Singulair and Flonase.  -Will refill her Serevent discus.         Relevant Medications    albuterol 90 mcg/actuation inhaler    albuterol 2.5 mg /3 mL (0.083 %) nebulizer solution    montelukast (Singulair) 10 mg tablet    fluticasone (Flonase) 50 mcg/actuation nasal spray    salmeterol (Serevent Diskus) 50 mcg/dose diskus inhaler    Essential hypertension    Overview     - Current regimen: Losartan 25 mg daily.         Current Assessment & Plan     Well-controlled today 132/70, will continue losartan 25 mg.         Relevant Medications    losartan (Cozaar) 25 mg tablet    Hyperlipidemia - Primary    Overview     Lifestyle managed, does take baby aspirin every other day.  Patient previously disinclined to start statin therapy due to possible side effects    5/2021 CACS was 205.36.    1/2022 TC/HDL ratio 4.2 ()  9/2023 TC/HDL ratio 3.7 ()  7/2024 TC/HDL ratio 3.9 ()         Current Assessment & Plan     Patient is not currently at goal with her lipids or triglycerides.  Her LDL is not at goal for her diabetes.  She has a pretty long allergy list and has been pretty resistant to any statin therapy.  -I did discuss starting a statin today.  Patient declined.    -I also discussed starting Zetia, if she did not want to start a statin.  She declined Zetia as well.  -In this situation I would recommend that she increase her fiber significantly in her diet.  She should have a goal of 25 to 30 g of fiber per day.  She should also be eating a high-fiber food with every cholesterol laden meal blood food.  -We discussed high-fiber foods  today including 3 Bs.  I did provide her with a high-fiber food sheet.         Type 2 diabetes mellitus, with long-term current use of insulin    Overview     Co-managed with [facility] pharmacy in past, re-enlisted clinical pharmacy help 10/29/2024.    Hgb A1c: 9.8 in 10/2024, 9.5 in 7/2024, 9.2 in 4/2024, 9.2 in 12/2023, 9.0 in 9/2023, 8.8 in 6/2023  Albumin: negative 7/2024   Foot exam: 12/2023 via PCP - also sees podiatry  Eye exam: 7/2024     Current regimen:  Humalog 12-18u tid w meals  Levemir 50u AM only  Titrating insulin with help of clinical pharmacists     Prior medications:   Metformin , unable to tolerate 2/2 hives and migraines.  Pioglitazone, unable to tolerate 2/2 hives and migraines.  Januvia, unable to tolerate 2/2 migraines.  Jardiance, unable to tolerate 2/2 side effects & decrease in kidney fxn.  Ozempic, unable to tolerate 2/2 due to joint and muscle pain.  Trulicity, unable to tolerate 2/2 side effect         Current Assessment & Plan     Patient currently uncontrolled with an A1c of 9.3 recently.  She does report that she has been sick pretty much since the holidays and has been not eating very well, as she was not feeling well.  -I did offer her a freestyle joseph 3 today as I think this would be helpful.  She does not want to pursue this right now, but she is going to think about it.  -I discussed with her on how to eat her meals as a diabetic, she should focus on her proteins and fats first and then be her carbs and starches in her meal.  -Her goal protein is 90 g/day.  -Her goal carb is to stay around 150 g/day or less.  -We will continue with her current regimen of her insulin.  I did discuss with her though she should only be taking her Humalog when she is eating.  At this time we will continue her Humalog 18 units with meals.  -We will continue her Lantus 50 units daily.  -Staff message was sent to Michael.          Relevant Medications    insulin lispro (HumaLOG KwikPen Insulin) 200 unit/mL (3  mL) insulin pen pen     Level 4    Follow-Up Recommendations: 3-month MEDICARE and A1c.     Please excuse any typos or grammatical errors, part of this note was constructed with Dragon dictation software.    Jayna Duval DO, MSEd  [facility]   Office: [phone]  2/9/2025 10:10 PM

## 2025-02-17 NOTE — ASSESSMENT & PLAN NOTE
Patient is not currently at goal with her lipids or triglycerides.  Her LDL is not at goal for her diabetes.  She has a pretty long allergy list and has been pretty resistant to any statin therapy.  -I did discuss starting a statin today.  Patient declined.    -I also discussed starting Zetia, if she did not want to start a statin.  She declined Zetia as well.  -In this situation I would recommend that she increase her fiber significantly in her diet.  She should have a goal of 25 to 30 g of fiber per day.  She should also be eating a high-fiber food with every cholesterol laden meal blood food.  -We discussed high-fiber foods today including 3 Bs.  I did provide her with a high-fiber food sheet.

## 2025-02-17 NOTE — ASSESSMENT & PLAN NOTE
Patient with history of asthma, no evidence of exacerbation today.  -We will refill her albuterol as needed inhaler as well as her nebulizer solution.  -Will refill her Singulair and Flonase.  -Will refill her Serevent discus.

## 2025-02-17 NOTE — ASSESSMENT & PLAN NOTE
Patient currently uncontrolled with an A1c of 9.3 recently.  She does report that she has been sick pretty much since the holidays and has been not eating very well, as she was not feeling well.  -I did offer her a freestyle joseph 3 today as I think this would be helpful.  She does not want to pursue this right now, but she is going to think about it.  -I discussed with her on how to eat her meals as a diabetic, she should focus on her proteins and fats first and then be her carbs and starches in her meal.  -Her goal protein is 90 g/day.  -Her goal carb is to stay around 150 g/day or less.  -We will continue with her current regimen of her insulin.  I did discuss with her though she should only be taking her Humalog when she is eating.  At this time we will continue her Humalog 18 units with meals.  -We will continue her Lantus 50 units daily.  -Staff message was sent to Michael.

## 2025-02-18 ENCOUNTER — APPOINTMENT (OUTPATIENT)
Dept: PHARMACY | Facility: HOSPITAL | Age: 75
End: 2025-02-18
Payer: MEDICARE

## 2025-02-18 DIAGNOSIS — Z79.4 TYPE 2 DIABETES MELLITUS WITH HYPERGLYCEMIA, WITH LONG-TERM CURRENT USE OF INSULIN: ICD-10-CM

## 2025-02-18 DIAGNOSIS — E11.65 TYPE 2 DIABETES MELLITUS WITH HYPERGLYCEMIA, WITH LONG-TERM CURRENT USE OF INSULIN: ICD-10-CM

## 2025-02-21 DIAGNOSIS — J45.909 ASTHMA, UNSPECIFIED ASTHMA SEVERITY, UNSPECIFIED WHETHER COMPLICATED, UNSPECIFIED WHETHER PERSISTENT (HHS-HCC): ICD-10-CM

## 2025-02-22 RX ORDER — SALMETEROL XINAFOATE 50 UG/1
1 POWDER, METERED ORAL; RESPIRATORY (INHALATION) EVERY 12 HOURS
Qty: 180 EACH | Refills: 3 | Status: SHIPPED | OUTPATIENT
Start: 2025-02-22

## 2025-02-22 NOTE — TELEPHONE ENCOUNTER
BRIEF NOTE    Subjective/Objective:  Pharmacy refill request for Serevent Diskus.     Assessment/Plan:  1. Asthma, unspecified asthma severity, unspecified whether complicated, unspecified whether persistent ([facility])  - salmeterol (Serevent Diskus) 50 mcg/dose diskus inhaler; Inhale 1 puff every 12 hours.  Dispense: 180 each; Refill: 3      No problem-specific Assessment & Plan notes found for this encounter.        Jayna Duval DO, MSEd  [facility]  Office: [phone]  2/22/2025 4:40 PM

## 2025-03-03 NOTE — ASSESSMENT & PLAN NOTE
Patient currently uncontrolled with A1c of 9.2% (goal of <8%). Since last visit the patient continued Mounjaro and reported some nausea and indigestion but it is tolerable at this point.  The patient's blood sugars have significantly improved with some recent hypoglycemia during the day.   Stressed the importance of taking the Humalog with meals to prevent hypoglycemia.  Patient would also like to remain on current dose of Mounjaro.  Will continue current regimen as numbers have improved and appear mostly in range.      PLAN:  - Continue Mounjaro 2.5 mg once weekly injection.    -  Decrease Humalog to 16 units WITH MEALS due to hypoglycemia   - Switch to Lantus 50 units once daily   - Continue to test blood sugar levels and watch for signs/symptoms of hypoglycemia.  Instructed patient to call if having hypoglycemic events.  Patient verbalized understanding.      Follow up: 4 weeks

## 2025-03-20 ENCOUNTER — APPOINTMENT (OUTPATIENT)
Dept: PHARMACY | Facility: HOSPITAL | Age: 75
End: 2025-03-20
Payer: MEDICARE

## 2025-03-20 DIAGNOSIS — Z79.4 TYPE 2 DIABETES MELLITUS WITH HYPERGLYCEMIA, WITH LONG-TERM CURRENT USE OF INSULIN: ICD-10-CM

## 2025-03-20 DIAGNOSIS — E11.65 TYPE 2 DIABETES MELLITUS WITH HYPERGLYCEMIA, WITH LONG-TERM CURRENT USE OF INSULIN: ICD-10-CM

## 2025-03-31 NOTE — PROGRESS NOTES
Subjective   Patient ID: Shanika Knowles is a 74 y.o. female who presents for Diabetes.    Referring Provider: Jayna Duval,      Diabetes  She presents for her follow-up diabetic visit. She has type 2 diabetes mellitus. Her disease course has been worsening. There are no hypoglycemic associated symptoms. There are no hypoglycemic complications. Required assistance: Patient drinks a coke to help with hypoglycemic episodes. Her weight is increasing steadily. She is following a generally unhealthy diet. Her home blood glucose trend is fluctuating dramatically.     Type II Diabetes  Current  Pharmacotherapy:   Humalog 18 units TID with meals (sometimes 16 with dinner in the evening)    Levemir 50 units in the morning   Mounjaro 2.5 mg once weekly injection      HISTORICAL PHARMACOTHERAPY  - Ozempic caused joint and muscle pain  - Jardiance caused decrease in kidney function  - Metformin caused severe migraines  - Januvia caused headaches   - Pioglitazone caused pain  - exenatide cause headaches  - Trulicity - severe muscle aches and joint pain     SECONDARY PREVENTION  - Statin? No   - ACE-I/ARB? Yes   - Aspirin? Yes     -The 10-year ASCVD risk score (Autumn BALDERAS, et al., 2019) is: 38.7%    Values used to calculate the score:      Age: 74 years      Sex: Female      Is Non- : No      Diabetic: Yes      Tobacco smoker: No      Systolic Blood Pressure: 132 mmHg      Is BP treated: Yes      HDL Cholesterol: 53 mg/dL      Total Cholesterol: 212 mg/dL      Current monitoring regimen:   Patient is using: glucometer  - Tried a CGM in the past but had trouble with adhesion and prefers glucometer.     SMBG Fasting Readings:   - 115,133,153,144    Hypoglycemia?   - reports a few reading of 55-70 over the last few weeks.    - has been treating with small amount of sugar     Pertinent PMH Review:  - PMH of Pancreatitis: No  - PMH/FH of Medullary Thyroid Cancer: No  - PMH of Retinopathy: No  - PMH of  Urinary Tract Infections: No     Diet/Lifestyle:   She has cut out almost all sugar and carbs     Review of Systems    Objective     There were no vitals taken for this visit.     Labs  Lab Results   Component Value Date    BILITOT 1.6 (H) 09/25/2023    CALCIUM 9.9 02/04/2025    CO2 26 02/04/2025     02/04/2025    CREATININE 0.95 02/04/2025    GLUCOSE 189 (H) 02/04/2025    ALKPHOS 74 09/25/2023    K 4.5 02/04/2025    PROT 7.2 09/25/2023     02/04/2025    AST 33 09/25/2023    ALT 27 09/25/2023    BUN 18 02/04/2025    ANIONGAP 8 02/04/2025    ALBUMIN 4.2 09/25/2023    GFRF 55 (A) 09/25/2023     Lab Results   Component Value Date    TRIG 202 (H) 02/04/2025    CHOL 212 (H) 02/04/2025    LDLCALC 127 (H) 02/04/2025    HDL 53 02/04/2025     Lab Results   Component Value Date    HGBA1C 9.3 (H) 02/04/2025       Current Outpatient Medications on File Prior to Visit   Medication Sig Dispense Refill    albuterol 2.5 mg /3 mL (0.083 %) nebulizer solution Take 3 mL (2.5 mg) by nebulization every 4 hours if needed for wheezing or shortness of breath. 270 mL 11    albuterol 90 mcg/actuation inhaler Inhale 2 puffs every 4 hours if needed for shortness of breath. 18 g 11    aspirin 81 mg chewable tablet once every 24 hours.      cholecalciferol, vitamin D3, (VITAMIN D3 ORAL) Take by mouth.      clotrimazole-betamethasone (Lotrisone) cream Apply thin layer on outer ear skin and open of the canal twice daily 2 days/week and use external hand cream on other days. 45 g 1    fluticasone (Flonase) 50 mcg/actuation nasal spray Administer 2 sprays into each nostril once daily at bedtime. Shake gently. Before first use, prime pump. After use, clean tip and replace cap. 16 g 2    hydrocortisone-acetic acid (Vosol-HC) otic solution 4 3 drops drops twice daily on 2 days/week for dryness and irritation.  Can use baby oil or sweet oil on other days as needed for itching 10 mL 1    insulin glargine (Lantus Solostar U-100 Insulin) 100  "unit/mL (3 mL) pen Inject 50 Units under the skin once daily. Take as directed per insulin instructions. 45 mL 2    insulin lispro (HumaLOG KwikPen Insulin) 200 unit/mL (3 mL) insulin pen pen Inject 18 Units under the skin 3 times a day before meals. 15 mL 1    lancets (OneTouch Delica Lancets) 33 gauge misc Use to test blood glucose three times daily 300 each 3    losartan (Cozaar) 25 mg tablet Take 1 tablet (25 mg) by mouth once daily. 90 tablet 1    montelukast (Singulair) 10 mg tablet Take 1 tablet (10 mg) by mouth once daily at bedtime. 90 tablet 3    NON FORMULARY NEUROVITE      omega-3/dha/epa/dpa/fish oil (OMEGA-3 2100 ORAL) Take by mouth.      OneTouch Ultra Test strip Use to test blood glucose three times daily 300 strip 3    pen needle, diabetic (BD Melvi 2nd Gen Pen Needle) 32 gauge x 5/32\" needle 1 Needle 4 times a day. 400 each 3    salmeterol (Serevent Diskus) 50 mcg/dose diskus inhaler Inhale 1 puff every 12 hours. 180 each 3    Synthroid 112 mcg tablet Take 1 tablet (112 mcg) by mouth once daily. 90 tablet 3    tirzepatide (Mounjaro) 2.5 mg/0.5 mL pen injector Inject 2.5 mg under the skin 1 (one) time per week. 2 mL 3    tiZANidine (Zanaflex) 2 mg tablet Take 1 tablet (2 mg) by mouth every 6 hours if needed for muscle spasms for up to 20 doses. 10 tablet 1    VITAMIN B COMPLEX ORAL Take by mouth.       No current facility-administered medications on file prior to visit.       Assessment/Plan   Problem List Items Addressed This Visit       Type 2 diabetes mellitus, with long-term current use of insulin     Patient currently uncontrolled with A1c of 9.2% (goal of <8%). Since last visit the patient continued Mounjaro and reported some nausea and indigestion but it is tolerable at this point.  Stressed the importance of taking the Humalog with meals to prevent hypoglycemia.  Patient would also like to remain on current dose of Mounjaro.  Will continue current regimen as numbers have improved and appear " mostly in range with no more hypoglycemia over the last few weeks.      PLAN:  - Continue Mounjaro 2.5 mg once weekly injection.    -  Continue Humalog to 16 units WITH MEALS due to hypoglycemia   - Switch to Lantus 50 units once daily   - Continue to test blood sugar levels and watch for signs/symptoms of hypoglycemia.  Instructed patient to call if having hypoglycemic events.  Patient verbalized understanding.      Follow up: 2 weeks          Relevant Orders    Referral to Clinical Pharmacy     Alanis Stock, PharmD    Continue all meds under the continuation of care with the referring provider and clinical pharmacy team.

## 2025-03-31 NOTE — ASSESSMENT & PLAN NOTE
Patient currently uncontrolled with A1c of 9.2% (goal of <8%). Since last visit the patient continued Mounjaro and reported some nausea and indigestion but it is tolerable at this point.  Stressed the importance of taking the Humalog with meals to prevent hypoglycemia.  Patient would also like to remain on current dose of Mounjaro.  Will continue current regimen as numbers have improved and appear mostly in range with no more hypoglycemia over the last few weeks.      PLAN:  - Continue Mounjaro 2.5 mg once weekly injection.    -  Continue Humalog to 16 units WITH MEALS due to hypoglycemia   - Switch to Lantus 50 units once daily   - Continue to test blood sugar levels and watch for signs/symptoms of hypoglycemia.  Instructed patient to call if having hypoglycemic events.  Patient verbalized understanding.      Follow up: 2 weeks

## 2025-04-03 ENCOUNTER — APPOINTMENT (OUTPATIENT)
Dept: PHARMACY | Facility: HOSPITAL | Age: 75
End: 2025-04-03
Payer: MEDICARE

## 2025-04-03 DIAGNOSIS — E11.65 TYPE 2 DIABETES MELLITUS WITH HYPERGLYCEMIA, WITH LONG-TERM CURRENT USE OF INSULIN: ICD-10-CM

## 2025-04-03 DIAGNOSIS — Z79.4 TYPE 2 DIABETES MELLITUS WITH HYPERGLYCEMIA, WITH LONG-TERM CURRENT USE OF INSULIN: ICD-10-CM

## 2025-04-03 RX ORDER — TIRZEPATIDE 2.5 MG/.5ML
2.5 INJECTION, SOLUTION SUBCUTANEOUS WEEKLY
Qty: 2 ML | Refills: 11 | Status: SHIPPED | OUTPATIENT
Start: 2025-04-03

## 2025-04-03 NOTE — ASSESSMENT & PLAN NOTE
Patient currently uncontrolled with A1c of 9.2% (goal of <8%). Since last visit the patient continued Mounjaro and reported some nausea and indigestion but it is tolerable at this point.  Stressed the importance of taking the Humalog with meals to prevent hypoglycemia.  Patient would also like to remain on current dose of Mounjaro.  The patient switched to Lantus and reported a burning sensation at 50 units so she decreased to dose to 40 units.  At the lower dose of Lantus the patient reports no side effects.  Will continue current regimen as numbers have improved and will work on consistency with diet.      PLAN:  - Continue Mounjaro 2.5 mg once weekly injection.    -  Continue Humalog  16-18 units WITH MEALS due to hypoglycemia   - Decrease Lantus to 40 units once daily due to burning sensation at previous dose.   - Continue to test blood sugar levels and watch for signs/symptoms of hypoglycemia.  Instructed patient to call if having hypoglycemic events.  Patient verbalized understanding.      Follow up: 4 weeks

## 2025-04-03 NOTE — PROGRESS NOTES
Subjective   Patient ID: Shanika Knowles is a 74 y.o. female who presents for Diabetes.    Referring Provider: Jayna Duval,      Diabetes  She presents for her follow-up diabetic visit. She has type 2 diabetes mellitus. Her disease course has been worsening. There are no hypoglycemic associated symptoms. There are no hypoglycemic complications. Required assistance: Patient drinks a coke to help with hypoglycemic episodes. Her weight is increasing steadily. She is following a generally unhealthy diet. Her home blood glucose trend is fluctuating dramatically.     Type II Diabetes  Current  Pharmacotherapy:   Humalog 18 units TID with meals (sometimes 16 with dinner in the evening)    Levemir 50 units in the morning - switched to Lantus 50 units  Mounjaro 2.5 mg once weekly injection      HISTORICAL PHARMACOTHERAPY  - Ozempic caused joint and muscle pain  - Jardiance caused decrease in kidney function  - Metformin caused severe migraines  - Januvia caused headaches   - Pioglitazone caused pain  - exenatide cause headaches  - Trulicity - severe muscle aches and joint pain   - Levemir - burning with >50 units and discontinued by     SECONDARY PREVENTION  - Statin? No   - ACE-I/ARB? Yes   - Aspirin? Yes     -The 10-year ASCVD risk score (Autumn BALDERAS, et al., 2019) is: 38.7%    Values used to calculate the score:      Age: 74 years      Sex: Female      Is Non- : No      Diabetic: Yes      Tobacco smoker: No      Systolic Blood Pressure: 132 mmHg      Is BP treated: Yes      HDL Cholesterol: 53 mg/dL      Total Cholesterol: 212 mg/dL      Current monitoring regimen:   Patient is using: glucometer  - Tried a CGM in the past but had trouble with adhesion and prefers glucometer.     SMBG Fasting Readings:   - 120,133,153,144,188    Hypoglycemia?   - none reported recently     Pertinent PMH Review:  - PMH of Pancreatitis: No  - PMH/FH of Medullary Thyroid Cancer: No  - PMH of Retinopathy:  No  - PMH of Urinary Tract Infections: No     Diet/Lifestyle:   She has cut out almost all sugar and carbs     Review of Systems    Objective     There were no vitals taken for this visit.     Labs  Lab Results   Component Value Date    BILITOT 1.6 (H) 09/25/2023    CALCIUM 9.9 02/04/2025    CO2 26 02/04/2025     02/04/2025    CREATININE 0.95 02/04/2025    GLUCOSE 189 (H) 02/04/2025    ALKPHOS 74 09/25/2023    K 4.5 02/04/2025    PROT 7.2 09/25/2023     02/04/2025    AST 33 09/25/2023    ALT 27 09/25/2023    BUN 18 02/04/2025    ANIONGAP 8 02/04/2025    ALBUMIN 4.2 09/25/2023    GFRF 55 (A) 09/25/2023     Lab Results   Component Value Date    TRIG 202 (H) 02/04/2025    CHOL 212 (H) 02/04/2025    LDLCALC 127 (H) 02/04/2025    HDL 53 02/04/2025     Lab Results   Component Value Date    HGBA1C 9.3 (H) 02/04/2025       Current Outpatient Medications on File Prior to Visit   Medication Sig Dispense Refill    albuterol 2.5 mg /3 mL (0.083 %) nebulizer solution Take 3 mL (2.5 mg) by nebulization every 4 hours if needed for wheezing or shortness of breath. 270 mL 11    albuterol 90 mcg/actuation inhaler Inhale 2 puffs every 4 hours if needed for shortness of breath. 18 g 11    aspirin 81 mg chewable tablet once every 24 hours.      cholecalciferol, vitamin D3, (VITAMIN D3 ORAL) Take by mouth.      clotrimazole-betamethasone (Lotrisone) cream Apply thin layer on outer ear skin and open of the canal twice daily 2 days/week and use external hand cream on other days. 45 g 1    fluticasone (Flonase) 50 mcg/actuation nasal spray Administer 2 sprays into each nostril once daily at bedtime. Shake gently. Before first use, prime pump. After use, clean tip and replace cap. 16 g 2    hydrocortisone-acetic acid (Vosol-HC) otic solution 4 3 drops drops twice daily on 2 days/week for dryness and irritation.  Can use baby oil or sweet oil on other days as needed for itching 10 mL 1    insulin glargine (Lantus Solostar U-100  "Insulin) 100 unit/mL (3 mL) pen Inject 50 Units under the skin once daily. Take as directed per insulin instructions. 45 mL 2    insulin lispro (HumaLOG KwikPen Insulin) 200 unit/mL (3 mL) insulin pen pen Inject 18 Units under the skin 3 times a day before meals. 15 mL 1    lancets (OneTouch Delica Lancets) 33 gauge misc Use to test blood glucose three times daily 300 each 3    losartan (Cozaar) 25 mg tablet Take 1 tablet (25 mg) by mouth once daily. 90 tablet 1    montelukast (Singulair) 10 mg tablet Take 1 tablet (10 mg) by mouth once daily at bedtime. 90 tablet 3    NON FORMULARY NEUROVITE      omega-3/dha/epa/dpa/fish oil (OMEGA-3 2100 ORAL) Take by mouth.      OneTouch Ultra Test strip Use to test blood glucose three times daily 300 strip 3    pen needle, diabetic (BD Melvi 2nd Gen Pen Needle) 32 gauge x 5/32\" needle 1 Needle 4 times a day. 400 each 3    salmeterol (Serevent Diskus) 50 mcg/dose diskus inhaler Inhale 1 puff every 12 hours. 180 each 3    Synthroid 112 mcg tablet Take 1 tablet (112 mcg) by mouth once daily. 90 tablet 3    tiZANidine (Zanaflex) 2 mg tablet Take 1 tablet (2 mg) by mouth every 6 hours if needed for muscle spasms for up to 20 doses. 10 tablet 1    VITAMIN B COMPLEX ORAL Take by mouth.      [DISCONTINUED] tirzepatide (Mounjaro) 2.5 mg/0.5 mL pen injector Inject 2.5 mg under the skin 1 (one) time per week. 2 mL 3     No current facility-administered medications on file prior to visit.       Assessment/Plan   Problem List Items Addressed This Visit       Type 2 diabetes mellitus, with long-term current use of insulin     Patient currently uncontrolled with A1c of 9.2% (goal of <8%). Since last visit the patient continued Mounjaro and reported some nausea and indigestion but it is tolerable at this point.  Stressed the importance of taking the Humalog with meals to prevent hypoglycemia.  Patient would also like to remain on current dose of Mounjaro.  The patient switched to Lantus and " reported a burning sensation at 50 units so she decreased to dose to 40 units.  At the lower dose of Lantus the patient reports no side effects.  Will continue current regimen as numbers have improved and will work on consistency with diet.      PLAN:  - Continue Mounjaro 2.5 mg once weekly injection.    -  Continue Humalog  16-18 units WITH MEALS due to hypoglycemia   - Decrease Lantus to 40 units once daily due to burning sensation at previous dose.   - Continue to test blood sugar levels and watch for signs/symptoms of hypoglycemia.  Instructed patient to call if having hypoglycemic events.  Patient verbalized understanding.      Follow up: 4 weeks          Relevant Medications    tirzepatide (Mounjaro) 2.5 mg/0.5 mL pen injector    Other Relevant Orders    Referral to Clinical Pharmacy     Alanis Stock, PharmD    Continue all meds under the continuation of care with the referring provider and clinical pharmacy team.

## 2025-05-01 ENCOUNTER — APPOINTMENT (OUTPATIENT)
Dept: PHARMACY | Facility: HOSPITAL | Age: 75
End: 2025-05-01
Payer: MEDICARE

## 2025-05-01 DIAGNOSIS — E11.65 TYPE 2 DIABETES MELLITUS WITH HYPERGLYCEMIA, WITH LONG-TERM CURRENT USE OF INSULIN: ICD-10-CM

## 2025-05-01 DIAGNOSIS — Z79.4 TYPE 2 DIABETES MELLITUS WITH HYPERGLYCEMIA, WITH LONG-TERM CURRENT USE OF INSULIN: ICD-10-CM

## 2025-05-01 DIAGNOSIS — Z79.4 TYPE 2 DIABETES MELLITUS WITH DIABETIC NEPHROPATHY, WITH LONG-TERM CURRENT USE OF INSULIN: ICD-10-CM

## 2025-05-01 DIAGNOSIS — E11.21 TYPE 2 DIABETES MELLITUS WITH DIABETIC NEPHROPATHY, WITH LONG-TERM CURRENT USE OF INSULIN: ICD-10-CM

## 2025-05-01 RX ORDER — INSULIN LISPRO 200 [IU]/ML
18 INJECTION, SOLUTION SUBCUTANEOUS
Qty: 15 ML | Refills: 3 | Status: SHIPPED | OUTPATIENT
Start: 2025-05-01

## 2025-05-01 NOTE — PROGRESS NOTES
Subjective   Patient ID: Shanika Knowles is a 74 y.o. female who presents for Diabetes.    Referring Provider: Jayna Duval,      Diabetes  She presents for her follow-up diabetic visit. She has type 2 diabetes mellitus. Her disease course has been worsening. There are no hypoglycemic associated symptoms. There are no hypoglycemic complications. Required assistance: Patient drinks a coke to help with hypoglycemic episodes. Her weight is increasing steadily. She is following a generally unhealthy diet. Her home blood glucose trend is fluctuating dramatically.     Type II Diabetes  Current  Pharmacotherapy:   Humalog 18 units TID with meals (sometimes 16 with dinner in the evening)    Lantus 40 units once daily   Mounjaro 2.5 mg once weekly injection      HISTORICAL PHARMACOTHERAPY  - Ozempic caused joint and muscle pain  - Jardiance caused decrease in kidney function  - Metformin caused severe migraines  - Januvia caused headaches   - Pioglitazone caused pain  - exenatide cause headaches  - Trulicity - severe muscle aches and joint pain   - Levemir - burning with >50 units and discontinued by     SECONDARY PREVENTION  - Statin? No   - ACE-I/ARB? Yes   - Aspirin? Yes     -The 10-year ASCVD risk score (Autumn BALDERAS, et al., 2019) is: 38.7%    Values used to calculate the score:      Age: 74 years      Sex: Female      Is Non- : No      Diabetic: Yes      Tobacco smoker: No      Systolic Blood Pressure: 132 mmHg      Is BP treated: Yes      HDL Cholesterol: 53 mg/dL      Total Cholesterol: 212 mg/dL      Current monitoring regimen:   Patient is using: glucometer  - Tried a CGM in the past but had trouble with adhesion and prefers glucometer.     SMBG Fasting Readings:   - 120,133,153,144,188  - Usually averaging 150 over the last month     Hypoglycemia?   - only 1 low blood sugar reported over the last month     Pertinent PMH Review:  - PMH of Pancreatitis: No  - PMH/FH of Medullary  Thyroid Cancer: No  - PMH of Retinopathy: No  - PMH of Urinary Tract Infections: No     Diet/Lifestyle:   She has cut out almost all sugar and carbs     Review of Systems    Objective     There were no vitals taken for this visit.     Labs  Lab Results   Component Value Date    BILITOT 1.6 (H) 09/25/2023    CALCIUM 9.9 02/04/2025    CO2 26 02/04/2025     02/04/2025    CREATININE 0.95 02/04/2025    GLUCOSE 189 (H) 02/04/2025    ALKPHOS 74 09/25/2023    K 4.5 02/04/2025    PROT 7.2 09/25/2023     02/04/2025    AST 33 09/25/2023    ALT 27 09/25/2023    BUN 18 02/04/2025    ANIONGAP 8 02/04/2025    ALBUMIN 4.2 09/25/2023    GFRF 55 (A) 09/25/2023     Lab Results   Component Value Date    TRIG 202 (H) 02/04/2025    CHOL 212 (H) 02/04/2025    LDLCALC 127 (H) 02/04/2025    HDL 53 02/04/2025     Lab Results   Component Value Date    HGBA1C 9.3 (H) 02/04/2025       Current Outpatient Medications on File Prior to Visit   Medication Sig Dispense Refill    albuterol 2.5 mg /3 mL (0.083 %) nebulizer solution Take 3 mL (2.5 mg) by nebulization every 4 hours if needed for wheezing or shortness of breath. 270 mL 11    albuterol 90 mcg/actuation inhaler Inhale 2 puffs every 4 hours if needed for shortness of breath. 18 g 11    aspirin 81 mg chewable tablet once every 24 hours.      cholecalciferol, vitamin D3, (VITAMIN D3 ORAL) Take by mouth.      clotrimazole-betamethasone (Lotrisone) cream Apply thin layer on outer ear skin and open of the canal twice daily 2 days/week and use external hand cream on other days. 45 g 1    fluticasone (Flonase) 50 mcg/actuation nasal spray Administer 2 sprays into each nostril once daily at bedtime. Shake gently. Before first use, prime pump. After use, clean tip and replace cap. 16 g 2    hydrocortisone-acetic acid (Vosol-HC) otic solution 4 3 drops drops twice daily on 2 days/week for dryness and irritation.  Can use baby oil or sweet oil on other days as needed for itching 10 mL 1     "insulin glargine (Lantus Solostar U-100 Insulin) 100 unit/mL (3 mL) pen Inject 50 Units under the skin once daily. Take as directed per insulin instructions. 45 mL 2    lancets (OneTouch Delica Lancets) 33 gauge misc Use to test blood glucose three times daily 300 each 3    losartan (Cozaar) 25 mg tablet Take 1 tablet (25 mg) by mouth once daily. 90 tablet 1    montelukast (Singulair) 10 mg tablet Take 1 tablet (10 mg) by mouth once daily at bedtime. 90 tablet 3    NON FORMULARY NEUROVITE      omega-3/dha/epa/dpa/fish oil (OMEGA-3 2100 ORAL) Take by mouth.      OneTouch Ultra Test strip Use to test blood glucose three times daily 300 strip 3    pen needle, diabetic (BD Melvi 2nd Gen Pen Needle) 32 gauge x 5/32\" needle 1 Needle 4 times a day. 400 each 3    salmeterol (Serevent Diskus) 50 mcg/dose diskus inhaler Inhale 1 puff every 12 hours. 180 each 3    Synthroid 112 mcg tablet Take 1 tablet (112 mcg) by mouth once daily. 90 tablet 3    tirzepatide (Mounjaro) 2.5 mg/0.5 mL pen injector Inject 2.5 mg under the skin 1 (one) time per week. 2 mL 11    tiZANidine (Zanaflex) 2 mg tablet Take 1 tablet (2 mg) by mouth every 6 hours if needed for muscle spasms for up to 20 doses. 10 tablet 1    VITAMIN B COMPLEX ORAL Take by mouth.      [DISCONTINUED] insulin lispro (HumaLOG KwikPen Insulin) 200 unit/mL (3 mL) insulin pen pen Inject 18 Units under the skin 3 times a day before meals. 15 mL 1     No current facility-administered medications on file prior to visit.       Assessment/Plan   Problem List Items Addressed This Visit       Type 2 diabetes mellitus, with long-term current use of insulin    Patient currently uncontrolled with A1c of 9.2% (goal of <8%). Since last visit the patient continued Mounjaro and reported some nausea and indigestion but it is tolerable at this point.  Stressed the importance of taking the Humalog with meals to prevent hypoglycemia.  Patient would also like to remain on current dose of Mounjaro.  " Will continue current regimen as numbers have improved and will work on consistency with diet.      PLAN:  - Continue Mounjaro 2.5 mg once weekly injection.    -  Continue Humalog  16-18 units WITH MEALS due to hypoglycemia   - Continue Lantus to 40 units once daily due to burning sensation at previous dose.   - Continue to test blood sugar levels and watch for signs/symptoms of hypoglycemia.  Instructed patient to call if having hypoglycemic events.  Patient verbalized understanding.      Follow up: 5 weeks          Relevant Medications    insulin lispro (HumaLOG KwikPen Insulin) 200 unit/mL (3 mL) insulin pen pen    Other Relevant Orders    Referral to Clinical Pharmacy       Alanis Stock, PharmD    Continue all meds under the continuation of care with the referring provider and clinical pharmacy team.

## 2025-05-01 NOTE — ASSESSMENT & PLAN NOTE
Patient currently uncontrolled with A1c of 9.2% (goal of <8%). Since last visit the patient continued Mounjaro and reported some nausea and indigestion but it is tolerable at this point.  Stressed the importance of taking the Humalog with meals to prevent hypoglycemia.  Patient would also like to remain on current dose of Mounjaro.  Will continue current regimen as numbers have improved and will work on consistency with diet.      PLAN:  - Continue Mounjaro 2.5 mg once weekly injection.    -  Continue Humalog  16-18 units WITH MEALS due to hypoglycemia   - Continue Lantus to 40 units once daily due to burning sensation at previous dose.   - Continue to test blood sugar levels and watch for signs/symptoms of hypoglycemia.  Instructed patient to call if having hypoglycemic events.  Patient verbalized understanding.      Follow up: 5 weeks

## 2025-05-21 ENCOUNTER — APPOINTMENT (OUTPATIENT)
Dept: AUDIOLOGY | Facility: CLINIC | Age: 75
End: 2025-05-21
Payer: MEDICARE

## 2025-05-21 ENCOUNTER — APPOINTMENT (OUTPATIENT)
Dept: OTOLARYNGOLOGY | Facility: CLINIC | Age: 75
End: 2025-05-21
Payer: MEDICARE

## 2025-05-21 DIAGNOSIS — H90.3 BILATERAL SENSORINEURAL HEARING LOSS: Primary | ICD-10-CM

## 2025-05-21 DIAGNOSIS — E11.69 TYPE 2 DIABETES MELLITUS WITH OTHER SPECIFIED COMPLICATION, WITH LONG-TERM CURRENT USE OF INSULIN: ICD-10-CM

## 2025-05-21 DIAGNOSIS — E11.21 TYPE 2 DIABETES MELLITUS WITH DIABETIC NEPHROPATHY, WITH LONG-TERM CURRENT USE OF INSULIN: ICD-10-CM

## 2025-05-21 DIAGNOSIS — J45.909 ASTHMA, UNSPECIFIED ASTHMA SEVERITY, UNSPECIFIED WHETHER COMPLICATED, UNSPECIFIED WHETHER PERSISTENT (HHS-HCC): ICD-10-CM

## 2025-05-21 DIAGNOSIS — Z79.4 TYPE 2 DIABETES MELLITUS WITH OTHER SPECIFIED COMPLICATION, WITH LONG-TERM CURRENT USE OF INSULIN: ICD-10-CM

## 2025-05-21 DIAGNOSIS — Z79.4 TYPE 2 DIABETES MELLITUS WITH DIABETIC NEPHROPATHY, WITH LONG-TERM CURRENT USE OF INSULIN: ICD-10-CM

## 2025-05-21 DIAGNOSIS — H61.893 DRYNESS OF BOTH EAR CANALS: ICD-10-CM

## 2025-05-21 PROCEDURE — 92557 COMPREHENSIVE HEARING TEST: CPT | Performed by: AUDIOLOGIST

## 2025-05-21 PROCEDURE — 1160F RVW MEDS BY RX/DR IN RCRD: CPT | Performed by: OTOLARYNGOLOGY

## 2025-05-21 PROCEDURE — 4010F ACE/ARB THERAPY RXD/TAKEN: CPT | Performed by: OTOLARYNGOLOGY

## 2025-05-21 PROCEDURE — 1159F MED LIST DOCD IN RCRD: CPT | Performed by: OTOLARYNGOLOGY

## 2025-05-21 PROCEDURE — 1036F TOBACCO NON-USER: CPT | Performed by: OTOLARYNGOLOGY

## 2025-05-21 PROCEDURE — 99214 OFFICE O/P EST MOD 30 MIN: CPT | Performed by: OTOLARYNGOLOGY

## 2025-05-21 PROCEDURE — 92567 TYMPANOMETRY: CPT | Performed by: AUDIOLOGIST

## 2025-05-21 RX ORDER — FLUOCINOLONE ACETONIDE 0.11 MG/ML
OIL AURICULAR (OTIC)
Qty: 20 ML | Refills: 3 | Status: SHIPPED | OUTPATIENT
Start: 2025-05-21 | End: 2025-05-21 | Stop reason: SINTOL

## 2025-05-21 RX ORDER — ACETIC ACID 20.65 MG/ML
SOLUTION AURICULAR (OTIC)
Qty: 15 ML | Refills: 3 | Status: SHIPPED | OUTPATIENT
Start: 2025-05-21

## 2025-05-23 ENCOUNTER — APPOINTMENT (OUTPATIENT)
Dept: PRIMARY CARE | Facility: CLINIC | Age: 75
End: 2025-05-23
Payer: MEDICARE

## 2025-05-23 RX ORDER — LANCETS 33 GAUGE
EACH MISCELLANEOUS
Qty: 400 EACH | Refills: 3 | Status: SHIPPED | OUTPATIENT
Start: 2025-05-23

## 2025-05-23 NOTE — TELEPHONE ENCOUNTER
BRIEF NOTE    Subjective/Objective:  Pharmacy refill request.     Assessment/Plan:  1. Type 2 diabetes mellitus with diabetic nephropathy, with long-term current use of insulin  - lancets (OneTouch Delica Lancets) 33 gauge misc; Use to test blood glucose four times per day.  Dispense: 400 each; Refill: 3      No problem-specific Assessment & Plan notes found for this encounter.        Jayna Duval DO, MSEd  [facility]  Office: [phone]  5/23/2025 4:33 PM

## 2025-05-29 ENCOUNTER — APPOINTMENT (OUTPATIENT)
Dept: PRIMARY CARE | Facility: CLINIC | Age: 75
End: 2025-05-29
Payer: MEDICARE

## 2025-05-29 VITALS
HEART RATE: 82 BPM | DIASTOLIC BLOOD PRESSURE: 70 MMHG | WEIGHT: 214.9 LBS | OXYGEN SATURATION: 93 % | SYSTOLIC BLOOD PRESSURE: 129 MMHG | BODY MASS INDEX: 34.54 KG/M2 | TEMPERATURE: 99.1 F | HEIGHT: 66 IN

## 2025-05-29 DIAGNOSIS — E78.2 MIXED HYPERLIPIDEMIA: ICD-10-CM

## 2025-05-29 DIAGNOSIS — Z79.4 TYPE 2 DIABETES MELLITUS WITH HYPERGLYCEMIA, WITH LONG-TERM CURRENT USE OF INSULIN: ICD-10-CM

## 2025-05-29 DIAGNOSIS — I10 ESSENTIAL HYPERTENSION: ICD-10-CM

## 2025-05-29 DIAGNOSIS — E11.65 TYPE 2 DIABETES MELLITUS WITH HYPERGLYCEMIA, WITH LONG-TERM CURRENT USE OF INSULIN: ICD-10-CM

## 2025-05-29 DIAGNOSIS — I25.10 CORONARY ARTERY DISEASE INVOLVING NATIVE CORONARY ARTERY OF NATIVE HEART WITHOUT ANGINA PECTORIS: ICD-10-CM

## 2025-05-29 DIAGNOSIS — E03.9 ACQUIRED HYPOTHYROIDISM: ICD-10-CM

## 2025-05-29 DIAGNOSIS — Z00.00 MEDICARE ANNUAL WELLNESS VISIT, SUBSEQUENT: Primary | ICD-10-CM

## 2025-05-29 DIAGNOSIS — J45.20 MILD INTERMITTENT ASTHMA WITHOUT COMPLICATION (HHS-HCC): ICD-10-CM

## 2025-05-29 LAB — POC HEMOGLOBIN A1C: 8.4 % (ref 4.2–6.5)

## 2025-05-29 PROCEDURE — 3078F DIAST BP <80 MM HG: CPT | Performed by: STUDENT IN AN ORGANIZED HEALTH CARE EDUCATION/TRAINING PROGRAM

## 2025-05-29 PROCEDURE — 3074F SYST BP LT 130 MM HG: CPT | Performed by: STUDENT IN AN ORGANIZED HEALTH CARE EDUCATION/TRAINING PROGRAM

## 2025-05-29 PROCEDURE — 4010F ACE/ARB THERAPY RXD/TAKEN: CPT | Performed by: STUDENT IN AN ORGANIZED HEALTH CARE EDUCATION/TRAINING PROGRAM

## 2025-05-29 PROCEDURE — 99497 ADVNCD CARE PLAN 30 MIN: CPT | Performed by: STUDENT IN AN ORGANIZED HEALTH CARE EDUCATION/TRAINING PROGRAM

## 2025-05-29 PROCEDURE — 1159F MED LIST DOCD IN RCRD: CPT | Performed by: STUDENT IN AN ORGANIZED HEALTH CARE EDUCATION/TRAINING PROGRAM

## 2025-05-29 PROCEDURE — 1170F FXNL STATUS ASSESSED: CPT | Performed by: STUDENT IN AN ORGANIZED HEALTH CARE EDUCATION/TRAINING PROGRAM

## 2025-05-29 PROCEDURE — 1160F RVW MEDS BY RX/DR IN RCRD: CPT | Performed by: STUDENT IN AN ORGANIZED HEALTH CARE EDUCATION/TRAINING PROGRAM

## 2025-05-29 PROCEDURE — G0446 INTENS BEHAVE THER CARDIO DX: HCPCS | Performed by: STUDENT IN AN ORGANIZED HEALTH CARE EDUCATION/TRAINING PROGRAM

## 2025-05-29 PROCEDURE — 99213 OFFICE O/P EST LOW 20 MIN: CPT | Performed by: STUDENT IN AN ORGANIZED HEALTH CARE EDUCATION/TRAINING PROGRAM

## 2025-05-29 PROCEDURE — 3052F HG A1C>EQUAL 8.0%<EQUAL 9.0%: CPT | Performed by: STUDENT IN AN ORGANIZED HEALTH CARE EDUCATION/TRAINING PROGRAM

## 2025-05-29 PROCEDURE — 83036 HEMOGLOBIN GLYCOSYLATED A1C: CPT | Performed by: STUDENT IN AN ORGANIZED HEALTH CARE EDUCATION/TRAINING PROGRAM

## 2025-05-29 PROCEDURE — G0439 PPPS, SUBSEQ VISIT: HCPCS | Performed by: STUDENT IN AN ORGANIZED HEALTH CARE EDUCATION/TRAINING PROGRAM

## 2025-05-29 ASSESSMENT — PATIENT HEALTH QUESTIONNAIRE - PHQ9
SUM OF ALL RESPONSES TO PHQ9 QUESTIONS 1 AND 2: 0
1. LITTLE INTEREST OR PLEASURE IN DOING THINGS: NOT AT ALL
2. FEELING DOWN, DEPRESSED OR HOPELESS: NOT AT ALL

## 2025-05-29 ASSESSMENT — ACTIVITIES OF DAILY LIVING (ADL)
MANAGING_FINANCES: INDEPENDENT
TAKING_MEDICATION: INDEPENDENT
BATHING: INDEPENDENT
GROCERY_SHOPPING: INDEPENDENT
DRESSING: INDEPENDENT
DOING_HOUSEWORK: INDEPENDENT

## 2025-05-29 ASSESSMENT — MINI COG
WORD_RECALL_SCORE: 2
PATIENT WAS GIVEN REPEAT BACK WORDS FROM VERSION: VERSION 1
CLOCK_DRAW_SCORE: 2
TOTAL SCORE: 4

## 2025-05-29 NOTE — PATIENT INSTRUCTIONS
VISIT SUMMARY:  Today, you came in for your Medicare wellness visit. We reviewed your diabetes management, hypertension, asthma, and coronary artery disease. We also discussed your recent ENT visit and your dietary habits.    YOUR PLAN:  -TYPE 2 DIABETES MELLITUS WITH HYPERGLYCEMIA: Type 2 diabetes is a condition where your body does not use insulin properly, leading to high blood sugar levels. Your A1c has improved to 8.4%, but you are experiencing low blood sugar episodes. Continue taking Lantus, Humalog, and Mounjaro as prescribed. Please consult with Michael, the pharmacist, for any adjustments to your regimen and consider making dietary changes to help manage your blood sugar levels.    -EXTENSIVE CORONARY ARTERY DISEASE: Coronary artery disease is a condition where the blood vessels supplying your heart are narrowed or blocked. Your risk of heart disease is high, but you prefer not to take statins. We will order a lipid panel for your next visit and discuss non-statin options with Michael, the pharmacist.    -HYPERTENSION: Hypertension is high blood pressure. Your condition is well-managed with losartan 25 mg daily. No changes are needed at this time.    -ASTHMA: Asthma is a condition where your airways become inflamed and narrow, making it hard to breathe. You have not needed your inhalers recently, so no changes are needed.    -HEARING LOSS: Hearing loss is a partial or total inability to hear. You have started using topical drops for ear dryness. Follow up with your audiologist in August.    -GOALS OF CARE: You have an advanced directive and living will, and you prefer that your family is aware of your wishes to avoid unnecessary medical interventions. Make sure your family knows about your advanced directives and preferences.    INSTRUCTIONS:  Please follow up with Michael, the pharmacist, for any adjustments to your diabetes and coronary artery disease management. We will order a lipid panel for your next visit.  Follow up with your audiologist in August. Ensure your family is aware of your advanced directives and wishes.

## 2025-05-29 NOTE — PROGRESS NOTES
FAMILY MEDICINE  ANNUAL MEDICARE WELLNESS VISIT   Shanika Knowles  [id number]  1950    PCP: Jayna Duval DO     Chief Complaint:   Chief Complaint   Patient presents with    Medicare Annual Wellness Visit Subsequent     Pt presents for annual MWV- ABN was given to pt and signed, pt verbalized understanding.     Follow-up     Pt presents for 3 month follow up DM- pt states no concerns/questions at this time.     SUBJECTIVE     Shanika Knowles is a 74 y.o. English-speaking female with pertinent PMHx of diabetes, who presents to the clinic for their annual medicare wellness visit.    Current Providers List  Patient Care Team:  Jayna Duval DO as PCP - General (Family Medicine)  Sangita Telles DO as PCP - [facility] Attributed Provider    History of Present Illness  Shanika Knowles is a 74 year old female who presents for a Medicare wellness visit.    Her diabetes management includes Lantus 50 units daily, Humalog 18 units three times a day with meals, and Mounjaro 2.5 mg weekly. Her A1c has decreased from 9.3 in February to 8.4 today. She experiences hypoglycemic episodes with blood sugar dropping to 54, managed with [employer] or [employer]. She has been working with a pharmacist named Michael and has been focusing on increasing her protein intake, which she attributes to her improved A1c. She reports nausea, vomiting, and joint pain with other GLP-1 medications, but tolerates Mounjaro well.    Her hypertension is managed with losartan 25 mg daily, and she reports stability in her condition since her last visit in February.    She has a history of asthma, which was stable at her last visit in February. She has not needed her inhalers recently, except for a brief period in January.    She has a significant allergy history with 14 allergies listed. She is not currently taking any statin medications. She has a history of extensive coronary artery disease with a CT cardiac score of 205 in the LAD from 2021. She has  never had a stress test or an echocardiogram.    She recently saw an ENT, Dr. Knapp, for ear dryness and was started on topical drops and oil. She experiences nasal septum irritation from improper use of fluticasone spray.    She focuses on a diet high in protein and avoids processed foods and desserts. She mentions difficulty in reaching her protein goals and expresses frustration with the time and effort required to plan her meals.      BMI  Body mass index is 34.69 kg/m².  The BMI is above average. The patient received Provided instructions on dietary changes  Advised to Increase physical activity because they have an above normal BMI.    Fall Risk & Home Safety  Have you fallen in the past year? No   Are you worried about falling? No  Home safety risk factors: Falls Home Safety Risk Factors  Home Safety Risk Factors: None  The patient does not have a history of falls. I did complete a risk assessment for falls. A plan of care for falls was documented.    Functional Ability/Level of Safety  Cognitive Impairment Observed: No cognitive impairment observed    Hearing & Vision Screen:   No results found.    Health Status  Good    Activity of Daily Living (ADLs)  ADL Screening  Hearing - Right Ear: Functional  Hearing - Left Ear: Functional  Bathing: Independent  Dressing: Independent  Walks in Home: Independent    Instrumental Activity of Daily Living (ADLs)  IADL's  Grocery Shopping: Independent  Doing Housework: Independent  Taking Medication: Independent  Managing Finances: Independent    Nutritional & Physical Activity Assessment  Nutrition and Exercise  Current Diet: Well Balanced Diet, Heart Healthy Diet  Adequate Fluid Intake: Yes  Caffeine: No  Exercise Frequency: Regularly    Mini Cog  Total Score:: 4  4/5         Depression Screening   Patient Health Questionnaire-2 Score: 0 (5/29/2025  1:40 PM)           PROBLEMS & MEDICAL HISTORY Problem List[1]    Medical History[2]   MEDICATIONS Current Outpatient  "Medications   Medication Instructions    acetic acid (Vosol) 2 % otic solution 3 drops to both ears twice a day 2 days weekly.    albuterol 90 mcg/actuation inhaler 2 puffs, inhalation, Every 4 hours PRN    albuterol 2.5 mg, nebulization, Every 4 hours PRN    aspirin 81 mg chewable tablet Every 24 hours    cholecalciferol, vitD3,/vit K2 (VITAMIN D3-VITAMIN K2 ORAL) Take by mouth.    fluticasone (Flonase) 50 mcg/actuation nasal spray 2 sprays, Each Nostril, Nightly, Shake gently. Before first use, prime pump. After use, clean tip and replace cap.    HumaLOG KwikPen Insulin 18 Units, subcutaneous, 3 times daily before meals    hydrocortisone-acetic acid (Vosol-HC) otic solution 4 3 drops drops twice daily on 2 days/week for dryness and irritation.  Can use baby oil or sweet oil on other days as needed for itching    lancets (OneTouch Delica Lancets) 33 gauge misc Use to test blood glucose four times per day.    Lantus Solostar U-100 Insulin 50 Units, subcutaneous, Daily, Take as directed per insulin instructions.    losartan (COZAAR) 25 mg, oral, Daily    montelukast (SINGULAIR) 10 mg, oral, Nightly    Mounjaro 2.5 mg, subcutaneous, Weekly    NON FORMULARY NEUROVITE    omega-3/dha/epa/dpa/fish oil (OMEGA-3 2100 ORAL) Take by mouth.    OneTouch Ultra Test strip Use to test blood glucose three times daily    pen needle, diabetic (BD Melvi 2nd Gen Pen Needle) 32 gauge x 5/32\" needle 1 Needle, miscellaneous, 4 times daily    salmeterol (Serevent Diskus) 50 mcg/dose diskus inhaler 1 puff, inhalation, Every 12 hours    Synthroid 112 mcg, oral, Daily    tiZANidine (ZANAFLEX) 2 mg, oral, Every 6 hours PRN    VITAMIN B COMPLEX ORAL Take by mouth.      ALLERGIES Allergies[3]   SURGICAL HISTORY Surgical History[4]   FAMILY HISTORY Family History[5]   IMMUNIZATION HISTORY Immunization History   Administered Date(s) Administered    COVID-19, mRNA, LNP-S, PF, 30 mcg/0.3 mL dose 03/05/2021, 04/02/2021, 10/31/2021    Flu vaccine, " "quadrivalent, high-dose, preservative free, age 65y+ (FLUZONE) 09/23/2021, 10/27/2023    Flu vaccine, trivalent, preservative free, HIGH-DOSE, age 65y+ (Fluzone) 09/30/2016, 10/07/2016, 10/02/2017, 10/29/2024    Influenza, Seasonal, Quadrivalent, Adjuvanted 10/14/2020, 10/17/2022    Influenza, seasonal, injectable 11/13/2002, 11/13/2003, 10/01/2014, 10/07/2014, 10/02/2017    Influenza, trivalent, adjuvanted 10/15/2018, 11/05/2019    Pfizer COVID-19 vaccine, 12 years and older, (30mcg/0.3mL) (Comirnaty) 09/26/2023, 09/12/2024    Pfizer COVID-19 vaccine, bivalent, age 12 years and older (30 mcg/0.3 mL) 11/17/2022    Pfizer Gray Cap SARS-CoV-2 04/26/2022    Pneumococcal conjugate vaccine, 13-valent (PREVNAR 13) 10/15/2015    Pneumococcal polysaccharide vaccine, 23-valent, age 2 years and older (PNEUMOVAX 23) 10/13/2006, 11/12/2014    Tdap vaccine, age 7 year and older (BOOSTRIX, ADACEL) 06/01/2023      SOCIAL HISTORY  Social History[6]     The following portions of the patient's chart were reviewed in this encounter and updated as appropriate:  Tobacco  Allergies  Meds  Problems  Med Hx  Surg Hx  Fam Hx           OBJECTIVE   /70 (BP Location: Left arm, Patient Position: Sitting, BP Cuff Size: Adult)   Pulse 82   Temp 37.3 °C (99.1 °F) (Temporal)   Ht 1.676 m (5' 6\")   Wt 97.5 kg (214 lb 14.4 oz)   SpO2 93%   BMI 34.69 kg/m²   Vital signs and pulse oximetry reviewed.     Physical Exam  Vitals and nursing note reviewed.   Constitutional:       Appearance: Normal appearance.   HENT:      Head: Normocephalic and atraumatic.      Right Ear: Tympanic membrane, ear canal and external ear normal.      Left Ear: Tympanic membrane, ear canal and external ear normal.      Nose: Nose normal. No congestion or rhinorrhea.   Eyes:      General: No scleral icterus.     Conjunctiva/sclera: Conjunctivae normal.   Cardiovascular:      Rate and Rhythm: Normal rate and regular rhythm.      Heart sounds: No murmur " heard.  Pulmonary:      Effort: Pulmonary effort is normal. No respiratory distress.      Breath sounds: Normal breath sounds. No wheezing, rhonchi or rales.   Musculoskeletal:      Cervical back: No rigidity or tenderness.      Right lower leg: No edema.      Left lower leg: No edema.   Lymphadenopathy:      Cervical: No cervical adenopathy.   Skin:     General: Skin is warm.      Coloration: Skin is not jaundiced.   Neurological:      Mental Status: She is alert. Mental status is at baseline.   Psychiatric:         Mood and Affect: Mood normal. Mood is not anxious, depressed or elated. Affect is not labile, flat or tearful.         Behavior: Behavior normal.         Physical Exam  CHEST: Lungs clear to auscultation, no wheezes.  CARDIOVASCULAR: Heart sounds normal.      Results  LABS  A1c: 8.4 (05/29/2025)  A1c: 9.3 (02/2025)    RADIOLOGY  CT cardiac score: Extensive coronary artery disease with a score of 205, predominantly in the left anterior descending artery (2021)    ASSESSMENT & PLAN     Code Status: Full Code    Medicare Annual Wellness Visit (AMWV) was completed today.  Preventive recommendations were reviewed and discussed with the patient, see orders below for recommendations patient elects for today.   Provided personalized health advice to patient with appropriate referrals for health education, preventive counseling programs and services.   Reviewed cognitive testing with patient and recommend repeat next year.   Updated the patients medical and family history.   To reduce your risk for falls, keep your home free of clutter, do not use rugs or mats unless they have a slip-free designation, and remove rugs from high traffic areas.    Updated written screening schedule previously discussed and provided personalized prevention plan.   We discussed the [facility] Code Status and patient is: Full Code at this time.   We discussed advanced care planning and end of life care. Patient provided blank copy  of [facility] Advanced Directives packet, which includes HCPOA and Living Will Declaration. Patient's Advanced Directives are as follows: has an advanced directive - a copy HAS NOT been provided.  Patient instructions with the written plan were provided to the patient.       Cardiac Risk Assessment  15 - 20 minutes were spent discussing Cardiovascular risk and, if needed, lifestyle modifications were recommended, including nutritional choices, exercise, and elimination of habits contributing to risk.   Aspirin use/disuse was discussed following the guidelines below:  low dose ASA ( mg) should be considered:    If prior Heart Attack/Stroke/Peripheral vascular disease:  Generally recommend daily low dose aspirin unless extremely high bleeding risk (e.g., gastrointestinal).    If no prior Heart Attack/Stroke/Peripheral vascular disease:              Age over 70: Do not use Aspirin for prevention    Age less than 70 and 10-year cardiovascular disease risk is >20%: use low dose Aspirin for prevention.                 Advance Directives Discussion  16 - 20 minutes were spent discussing Advanced Care Planning (including a Living Will, Medical Power Of , as well as specific end of life choices and/or directives). The details of that discussion were documented in Advanced Directives Discussion section of the medical record.       Health Maintenance   Topic Date Due    Hepatitis C Screening  Never done    Zoster Vaccines (1 of 2) Never done    RSV High Risk: (Elderly (60+) or Pregnant Population) (1 - 1-dose 75+ series) Never done    COVID-19 Vaccine (5 - 2024-25 season) 03/12/2025    Diabetes: Retinopathy Screening  07/11/2025    Diabetes: Hemoglobin A1C  08/29/2025    Bone Density Scan  01/02/2026    TSH Level  02/04/2026    Lipid Panel  02/04/2026    Medicare Annual Wellness Visit (AWV)  05/30/2026    Colorectal Cancer Screening  05/06/2031    DTaP/Tdap/Td Vaccines (2 - Td or Tdap) 06/01/2033    Influenza  Vaccine  Completed    Pneumococcal Vaccine  Completed    HIB Vaccines  Aged Out    Hepatitis B Vaccines  Aged Out    IPV Vaccines  Aged Out    Hepatitis A Vaccines  Aged Out    Meningococcal Vaccine  Aged Out    Rotavirus Vaccines  Aged Out    HPV Vaccines  Aged Out    Welcome to Medicare Visit  Discontinued    Mammogram  Discontinued    Diabetes: Urine Protein Screening  Discontinued    Irritable Bowel Syndrome  Discontinued       Problem List Items Addressed This Visit       Acquired hypothyroidism    Overview   On synthroid therapy managed via PCP  Current dose: Synthroid 112 mcg daily.   7/2024 TSH 0.34 (low), T4 1.23 (nml)         Relevant Orders    Hemoglobin A1C    Lipid Panel    Comprehensive Metabolic Panel    CBC and Auto Differential    TSH with reflex to Free T4 if abnormal    Albumin-Creatinine Ratio, Urine Random    Asthma    Coronary artery disease    Overview   5/2021 CACS was 205.36.  1/2022 TC/HDL ratio 4.2 ()  9/2023 TC/HDL ratio 3.7 ().  7/2024 TC/HDL ratio 3.9 ()    Patient previously disinclined to start statin therapy due to possible side effects.  Lifestyle managed, does take baby aspirin every other day.         Relevant Orders    Hemoglobin A1C    Lipid Panel    Comprehensive Metabolic Panel    CBC and Auto Differential    TSH with reflex to Free T4 if abnormal    Albumin-Creatinine Ratio, Urine Random    Full code (Completed)    Follow Up In Advanced [facility] Care - PCP - Established    Follow Up In Advanced [facility] Care - PCP - Established    Essential hypertension    Overview   - Current regimen: Losartan 25 mg daily.         Relevant Orders    Hemoglobin A1C    Lipid Panel    Comprehensive Metabolic Panel    CBC and Auto Differential    TSH with reflex to Free T4 if abnormal    Albumin-Creatinine Ratio, Urine Random    Hyperlipidemia    Overview   Lifestyle managed, does take baby aspirin every other day.  Patient previously disinclined to start statin therapy due to  possible side effects    5/2021 CACS was 205.36.    1/2022 TC/HDL ratio 4.2 ()  9/2023 TC/HDL ratio 3.7 ()  7/2024 TC/HDL ratio 3.9 ()         Relevant Orders    Hemoglobin A1C    Lipid Panel    Comprehensive Metabolic Panel    CBC and Auto Differential    TSH with reflex to Free T4 if abnormal    Albumin-Creatinine Ratio, Urine Random    Full code (Completed)    Medicare annual wellness visit, subsequent - Primary    Overview   TDAP: 6/2023  SHINGRIX: none found  PNEUMOVAX: completed  PAP: 11/2018 ASCUS negative - graduated   MAMMO: 10/2023  CSCOPE: 5/2021 (tubular adenoma x 2 + sessile serrated x 1 + normal mucosa + good prep) (due 2024)   DEXA: 10/2021 (osteopenia 2/3) (due 10/2023)   HEP C SCREEN: none found  CACS: 205.36 in 5/2021         Relevant Orders    Full code (Completed)    Follow Up In Advanced [facility] Care - PCP - Medicare Annual    Type 2 diabetes mellitus, with long-term current use of insulin    Overview   Co-managed with [facility] in past, re-enlisted clinical pharmacy help 10/29/2024.    Hgb A1c: 9.8 in 10/2024, 9.5 in 7/2024, 9.2 in 4/2024, 9.2 in 12/2023, 9.0 in 9/2023, 8.8 in 6/2023  Albumin: negative 7/2024   Foot exam: 12/2023 via PCP - also sees podiatry  Eye exam: 7/2024     Current regimen:  Humalog 12-18u tid w meals  Levemir 50u AM only  Titrating insulin with help of clinical pharmacists     Prior medications:   Metformin , unable to tolerate 2/2 hives and migraines.  Pioglitazone, unable to tolerate 2/2 hives and migraines.  Januvia, unable to tolerate 2/2 migraines.  Jardiance, unable to tolerate 2/2 side effects & decrease in kidney fxn.  Ozempic, unable to tolerate 2/2 due to joint and muscle pain.  Trulicity, unable to tolerate 2/2 side effect         Relevant Orders    POCT glycosylated hemoglobin (Hb A1C) manually resulted (Completed)    Hemoglobin A1C    Lipid Panel    Comprehensive Metabolic Panel    CBC and Auto Differential    TSH with reflex to Free  T4 if abnormal    Albumin-Creatinine Ratio, Urine Random    Follow Up In Advanced [facility] Care - PCP - Established    Follow Up In Advanced [facility] - PCP - Established       Assessment & Plan  Type 2 diabetes mellitus with hyperglycemia  A1c improved to 8.4%. Experiencing hypoglycemia. Prefers no CGM.  - Continue Lantus, Humalog, and Mounjaro.  - Consult with Michael, the pharmacist, for regimen adjustments.  - Consider dietary modifications.    Extensive coronary artery disease  ASCVD risk 38%. Strong preference against statins.  - Order lipid panel for next visit.  - Discuss non-statin options with Michael, the pharmacist.    Hypertension  Well-managed on losartan 25 mg daily.    Asthma  No recent inhaler use.    Hearing loss  Recently started on topical drops and oil. Advised to avoid Q-tips and use hearing aids.  - Follow up with audiologist in August.    Goals of Care  Has advanced directive and living will. Prefers family awareness to avoid unnecessary interventions.  - Ensure family is aware of her advanced directives and wishes.        CV  ACP  Level 3    Follow-Up Recommendations: 6mo    Please excuse any typos or grammatical errors, part of this note was constructed with Dragon dictation software.    This medical note was created with the assistance of artificial intelligence (AI) for documentation purposes. The content has been reviewed and confirmed by the healthcare provider for accuracy and completeness. Patient consented to the use of audio recording and use of AI during their visit.         Jayna Duval DO, MSEd  [facility]   Office: [phone]  6/15/2025 10:14 PM         [1]   Patient Active Problem List  Diagnosis    Acquired hypothyroidism    Asthma    Bilateral sensorineural hearing loss    History of decreased renal function    Coronary artery disease    Essential hypertension    Hyperlipidemia    Osteopenia    Type 2 diabetes mellitus, with long-term current use of  insulin    Medicare annual wellness visit, subsequent    Vitamin D deficiency    Low back pain    Lumbar spondylosis   [2]   Past Medical History:  Diagnosis Date    Class 2 obesity with body mass index (BMI) of 35.0 to 35.9 in adult 2023    Encounter for gynecological examination (general) (routine) without abnormal findings 2018    Well female exam with routine gynecological exam    Personal history of other drug therapy     COVID-19 vaccine series completed    Personal history of other endocrine, nutritional and metabolic disease     History of hypothyroidism    Personal history of other infectious and parasitic diseases     History of chickenpox    Stage 3 chronic kidney disease (Multi) 2024    Type 1 diabetes mellitus without complications     Type 1 diabetes mellitus without complication   [3]   Allergies  Allergen Reactions    Penicillin V Anaphylaxis    Trulicity [Dulaglutide] Other     MUSCLE PAINS, JOINT PAINS - SEVERE     Bee Pollen Other    Ciprofloxacin Other and GI Upset     GI upset    Empagliflozin Other    Erythromycin Swelling    Exenatide Headache and Other     headache    Metformin Hives     Severe Migraines    Nut - Unspecified Hives    Pioglitazone Other and Myalgia     pain    Semaglutide Other    Shellfish Containing Products Other    Sitagliptin Headache and Other     Headache    Sulfa (Sulfonamide Antibiotics) Other   [4]   Past Surgical History:  Procedure Laterality Date    DILATION AND CURETTAGE OF UTERUS  2018    Dilation And Curettage    OTHER SURGICAL HISTORY  2018    Anal Fistulectomy    TONSILLECTOMY  2018    Tonsillectomy   [5]   Family History  Family history unknown: Yes   [6]   Social History  Socioeconomic History    Marital status:    Tobacco Use    Smoking status: Former     Current packs/day: 0.00     Types: Cigarettes     Quit date:      Years since quittin.4    Smokeless tobacco: Never   Vaping Use    Vaping status: Never  Used   Substance and Sexual Activity    Alcohol use: Not Currently    Drug use: Never

## 2025-06-05 ENCOUNTER — APPOINTMENT (OUTPATIENT)
Dept: PHARMACY | Facility: HOSPITAL | Age: 75
End: 2025-06-05
Payer: MEDICARE

## 2025-06-05 DIAGNOSIS — Z79.4 TYPE 2 DIABETES MELLITUS WITH DIABETIC NEPHROPATHY, WITH LONG-TERM CURRENT USE OF INSULIN: ICD-10-CM

## 2025-06-05 DIAGNOSIS — E11.21 TYPE 2 DIABETES MELLITUS WITH DIABETIC NEPHROPATHY, WITH LONG-TERM CURRENT USE OF INSULIN: ICD-10-CM

## 2025-06-05 DIAGNOSIS — Z79.4 TYPE 2 DIABETES MELLITUS WITH HYPERGLYCEMIA, WITH LONG-TERM CURRENT USE OF INSULIN: Primary | ICD-10-CM

## 2025-06-05 DIAGNOSIS — E11.65 TYPE 2 DIABETES MELLITUS WITH HYPERGLYCEMIA, WITH LONG-TERM CURRENT USE OF INSULIN: Primary | ICD-10-CM

## 2025-06-05 NOTE — PROGRESS NOTES
Subjective   Patient ID: Shanika Knowles is a 74 y.o. female who presents for Diabetes.    Referring Provider: Jayna Duval,      Diabetes  She presents for her follow-up diabetic visit. She has type 2 diabetes mellitus. Her disease course has been worsening. There are no hypoglycemic associated symptoms. There are no hypoglycemic complications. Required assistance: Patient drinks a coke to help with hypoglycemic episodes. Her weight is increasing steadily. She is following a generally unhealthy diet. Her home blood glucose trend is fluctuating dramatically.     Type II Diabetes  Current  Pharmacotherapy:   Humalog 18 units TID with meals (sometimes 16 with dinner in the evening)    Lantus 40 units once daily   Mounjaro 2.5 mg once weekly injection      HISTORICAL PHARMACOTHERAPY  - Ozempic caused joint and muscle pain  - Jardiance caused decrease in kidney function  - Metformin caused severe migraines  - Januvia caused headaches   - Pioglitazone caused pain  - exenatide cause headaches  - Trulicity - severe muscle aches and joint pain   - Levemir - burning with >50 units and discontinued by     SECONDARY PREVENTION  - Statin? No   - ACE-I/ARB? Yes   - Aspirin? Yes     -The 10-year ASCVD risk score (Autumn BALDERAS, et al., 2019) is: 37.3%    Values used to calculate the score:      Age: 74 years      Sex: Female      Is Non- : No      Diabetic: Yes      Tobacco smoker: No      Systolic Blood Pressure: 129 mmHg      Is BP treated: Yes      HDL Cholesterol: 53 mg/dL      Total Cholesterol: 212 mg/dL      Current monitoring regimen:   Patient is using: glucometer  - Tried a CGM in the past but had trouble with adhesion and prefers glucometer.     SMBG Fasting Readings:   - 120,133,153,144,188  - Usually averaging 150 over the last month     Hypoglycemia?   - only 2 low blood sugar reported over the last month     Pertinent PMH Review:  - PMH of Pancreatitis: No  - PMH/FH of Medullary  Thyroid Cancer: No  - PMH of Retinopathy: No  - PMH of Urinary Tract Infections: No     Diet/Lifestyle:   She has cut out almost all sugar and carbs     Review of Systems    Objective     There were no vitals taken for this visit.     Labs  Lab Results   Component Value Date    BILITOT 1.6 (H) 09/25/2023    CALCIUM 9.9 02/04/2025    CO2 26 02/04/2025     02/04/2025    CREATININE 0.95 02/04/2025    GLUCOSE 189 (H) 02/04/2025    ALKPHOS 74 09/25/2023    K 4.5 02/04/2025    PROT 7.2 09/25/2023     02/04/2025    AST 33 09/25/2023    ALT 27 09/25/2023    BUN 18 02/04/2025    ANIONGAP 8 02/04/2025    ALBUMIN 4.2 09/25/2023    GFRF 55 (A) 09/25/2023     Lab Results   Component Value Date    TRIG 202 (H) 02/04/2025    CHOL 212 (H) 02/04/2025    LDLCALC 127 (H) 02/04/2025    HDL 53 02/04/2025     Lab Results   Component Value Date    HGBA1C 8.4 (A) 05/29/2025       Current Outpatient Medications on File Prior to Visit   Medication Sig Dispense Refill    acetic acid (Vosol) 2 % otic solution 3 drops to both ears twice a day 2 days weekly. 15 mL 3    albuterol 2.5 mg /3 mL (0.083 %) nebulizer solution Take 3 mL (2.5 mg) by nebulization every 4 hours if needed for wheezing or shortness of breath. 270 mL 11    albuterol 90 mcg/actuation inhaler Inhale 2 puffs every 4 hours if needed for shortness of breath. 18 g 11    aspirin 81 mg chewable tablet once every 24 hours.      cholecalciferol, vitD3,/vit K2 (VITAMIN D3-VITAMIN K2 ORAL) Take by mouth.      fluticasone (Flonase) 50 mcg/actuation nasal spray Administer 2 sprays into each nostril once daily at bedtime. Shake gently. Before first use, prime pump. After use, clean tip and replace cap. 16 g 2    hydrocortisone-acetic acid (Vosol-HC) otic solution 4 3 drops drops twice daily on 2 days/week for dryness and irritation.  Can use baby oil or sweet oil on other days as needed for itching 10 mL 1    insulin glargine (Lantus Solostar U-100 Insulin) 100 unit/mL (3 mL)  "pen Inject 50 Units under the skin once daily. Take as directed per insulin instructions. 45 mL 2    insulin lispro (HumaLOG KwikPen Insulin) 200 unit/mL (3 mL) insulin pen pen Inject 18 Units under the skin 3 times a day before meals. 15 mL 3    lancets (OneTouch Delica Lancets) 33 gauge misc Use to test blood glucose four times per day. 400 each 3    losartan (Cozaar) 25 mg tablet Take 1 tablet (25 mg) by mouth once daily. 90 tablet 1    montelukast (Singulair) 10 mg tablet Take 1 tablet (10 mg) by mouth once daily at bedtime. 90 tablet 3    NON FORMULARY NEUROVITE      omega-3/dha/epa/dpa/fish oil (OMEGA-3 2100 ORAL) Take by mouth.      OneTouch Ultra Test strip Use to test blood glucose three times daily 300 strip 3    pen needle, diabetic (BD Melvi 2nd Gen Pen Needle) 32 gauge x 5/32\" needle 1 Needle 4 times a day. 400 each 3    salmeterol (Serevent Diskus) 50 mcg/dose diskus inhaler Inhale 1 puff every 12 hours. 180 each 3    Synthroid 112 mcg tablet Take 1 tablet (112 mcg) by mouth once daily. 90 tablet 3    tirzepatide (Mounjaro) 2.5 mg/0.5 mL pen injector Inject 2.5 mg under the skin 1 (one) time per week. 2 mL 11    tiZANidine (Zanaflex) 2 mg tablet Take 1 tablet (2 mg) by mouth every 6 hours if needed for muscle spasms for up to 20 doses. 10 tablet 1    VITAMIN B COMPLEX ORAL Take by mouth.       No current facility-administered medications on file prior to visit.       Assessment/Plan   Problem List Items Addressed This Visit       Type 2 diabetes mellitus, with long-term current use of insulin - Primary    Patient currently uncontrolled with A1c of 8.4% (goal of <8%). Since last visit the patient continued Mounjaro and reported some nausea and indigestion but it is tolerable at this point.  Stressed the importance of taking the Humalog with meals to prevent hypoglycemia.  Patient would also like to remain on current dose of Mounjaro.  Will continue current regimen as numbers have significantly improved " and will work on consistency with diet especially protein intake.      PLAN:  - Continue Mounjaro 2.5 mg once weekly injection.    -  Continue Humalog  16-18 units WITH MEALS due to hypoglycemia   - Continue Lantus to 40 units once daily due to burning sensation at previous dose.   - Continue to test blood sugar levels and watch for signs/symptoms of hypoglycemia.  Instructed patient to call if having hypoglycemic events.  Patient verbalized understanding.      Follow up: 6 weeks          Relevant Orders    Referral to Clinical Pharmacy     Alansi Stock, PharmD    Continue all meds under the continuation of care with the referring provider and clinical pharmacy team.

## 2025-06-05 NOTE — ASSESSMENT & PLAN NOTE
Patient currently uncontrolled with A1c of 8.4% (goal of <8%). Since last visit the patient continued Mounjaro and reported some nausea and indigestion but it is tolerable at this point.  Stressed the importance of taking the Humalog with meals to prevent hypoglycemia.  Patient would also like to remain on current dose of Mounjaro.  Will continue current regimen as numbers have significantly improved and will work on consistency with diet especially protein intake.      PLAN:  - Continue Mounjaro 2.5 mg once weekly injection.    -  Continue Humalog  16-18 units WITH MEALS due to hypoglycemia   - Continue Lantus to 40 units once daily due to burning sensation at previous dose.   - Continue to test blood sugar levels and watch for signs/symptoms of hypoglycemia.  Instructed patient to call if having hypoglycemic events.  Patient verbalized understanding.      Follow up: 6 weeks

## 2025-07-17 ENCOUNTER — APPOINTMENT (OUTPATIENT)
Dept: PHARMACY | Facility: HOSPITAL | Age: 75
End: 2025-07-17
Payer: MEDICARE

## 2025-07-17 DIAGNOSIS — Z79.4 TYPE 2 DIABETES MELLITUS WITH HYPERGLYCEMIA, WITH LONG-TERM CURRENT USE OF INSULIN: Primary | ICD-10-CM

## 2025-07-17 DIAGNOSIS — E11.65 TYPE 2 DIABETES MELLITUS WITH HYPERGLYCEMIA, WITH LONG-TERM CURRENT USE OF INSULIN: Primary | ICD-10-CM

## 2025-07-17 NOTE — PROGRESS NOTES
Subjective   Patient ID: Shanika Knowles is a 74 y.o. female who presents for Diabetes.    Referring Provider: Jayna Duval,      Diabetes  She presents for her follow-up diabetic visit. She has type 2 diabetes mellitus. Her disease course has been worsening. There are no hypoglycemic associated symptoms. There are no hypoglycemic complications. Required assistance: Patient drinks a coke to help with hypoglycemic episodes. Her weight is increasing steadily. She is following a generally unhealthy diet. Her home blood glucose trend is fluctuating dramatically.     Type II Diabetes  Current  Pharmacotherapy:   Humalog 17 units twice daily with meals   Lantus 40 units once daily   Mounjaro 2.5 mg once weekly injection      HISTORICAL PHARMACOTHERAPY  - Ozempic caused joint and muscle pain  - Jardiance caused decrease in kidney function  - Metformin caused severe migraines  - Januvia caused headaches   - Pioglitazone caused pain  - exenatide cause headaches  - Trulicity - severe muscle aches and joint pain   - Levemir - burning with >50 units and discontinued by     SECONDARY PREVENTION  - Statin? No   - ACE-I/ARB? Yes   - Aspirin? Yes     -The 10-year ASCVD risk score (Autumn BALDERAS, et al., 2019) is: 37.3%    Values used to calculate the score:      Age: 74 years      Clincally relevant sex: Female      Is Non- : No      Diabetic: Yes      Tobacco smoker: No      Systolic Blood Pressure: 129 mmHg      Is BP treated: Yes      HDL Cholesterol: 53 mg/dL      Total Cholesterol: 212 mg/dL      Current monitoring regimen:   Patient is using: glucometer  - Tried a CGM in the past but had trouble with adhesion and prefers glucometer.     SMBG Fasting Readings:   - 120,133,153,144,188  - Usually averaging 150    Hypoglycemia?   - only 1 low blood sugar reported of 79 over the last month     Pertinent PMH Review:  - PMH of Pancreatitis: No  - PMH/FH of Medullary Thyroid Cancer: No  - PMH of  Retinopathy: No  - PMH of Urinary Tract Infections: No     Diet/Lifestyle:   She has cut out almost all sugar and carbs     Review of Systems    Objective     There were no vitals taken for this visit.     Labs  Lab Results   Component Value Date    BILITOT 1.6 (H) 09/25/2023    CALCIUM 9.9 02/04/2025    CO2 26 02/04/2025     02/04/2025    CREATININE 0.95 02/04/2025    GLUCOSE 189 (H) 02/04/2025    ALKPHOS 74 09/25/2023    K 4.5 02/04/2025    PROT 7.2 09/25/2023     02/04/2025    AST 33 09/25/2023    ALT 27 09/25/2023    BUN 18 02/04/2025    ANIONGAP 8 02/04/2025    ALBUMIN 4.2 09/25/2023    GFRF 55 (A) 09/25/2023     Lab Results   Component Value Date    TRIG 202 (H) 02/04/2025    CHOL 212 (H) 02/04/2025    LDLCALC 127 (H) 02/04/2025    HDL 53 02/04/2025     Lab Results   Component Value Date    HGBA1C 8.4 (A) 05/29/2025       Current Outpatient Medications on File Prior to Visit   Medication Sig Dispense Refill    acetic acid (Vosol) 2 % otic solution 3 drops to both ears twice a day 2 days weekly. 15 mL 3    albuterol 2.5 mg /3 mL (0.083 %) nebulizer solution Take 3 mL (2.5 mg) by nebulization every 4 hours if needed for wheezing or shortness of breath. 270 mL 11    albuterol 90 mcg/actuation inhaler Inhale 2 puffs every 4 hours if needed for shortness of breath. 18 g 11    aspirin 81 mg chewable tablet once every 24 hours.      cholecalciferol, vitD3,/vit K2 (VITAMIN D3-VITAMIN K2 ORAL) Take by mouth.      fluticasone (Flonase) 50 mcg/actuation nasal spray Administer 2 sprays into each nostril once daily at bedtime. Shake gently. Before first use, prime pump. After use, clean tip and replace cap. 16 g 2    hydrocortisone-acetic acid (Vosol-HC) otic solution 4 3 drops drops twice daily on 2 days/week for dryness and irritation.  Can use baby oil or sweet oil on other days as needed for itching 10 mL 1    insulin glargine (Lantus Solostar U-100 Insulin) 100 unit/mL (3 mL) pen Inject 50 Units under the  "skin once daily. Take as directed per insulin instructions. 45 mL 2    insulin lispro (HumaLOG KwikPen Insulin) 200 unit/mL (3 mL) insulin pen pen Inject 18 Units under the skin 3 times a day before meals. 15 mL 3    lancets (OneTouch Delica Lancets) 33 gauge misc Use to test blood glucose four times per day. 400 each 3    losartan (Cozaar) 25 mg tablet Take 1 tablet (25 mg) by mouth once daily. 90 tablet 1    montelukast (Singulair) 10 mg tablet Take 1 tablet (10 mg) by mouth once daily at bedtime. 90 tablet 3    NON FORMULARY NEUROVITE      omega-3/dha/epa/dpa/fish oil (OMEGA-3 2100 ORAL) Take by mouth.      OneTouch Ultra Test strip Use to test blood glucose three times daily 300 strip 3    pen needle, diabetic (BD Melvi 2nd Gen Pen Needle) 32 gauge x 5/32\" needle 1 Needle 4 times a day. 400 each 3    salmeterol (Serevent Diskus) 50 mcg/dose diskus inhaler Inhale 1 puff every 12 hours. 180 each 3    Synthroid 112 mcg tablet Take 1 tablet (112 mcg) by mouth once daily. 90 tablet 3    tirzepatide (Mounjaro) 2.5 mg/0.5 mL pen injector Inject 2.5 mg under the skin 1 (one) time per week. 2 mL 11    tiZANidine (Zanaflex) 2 mg tablet Take 1 tablet (2 mg) by mouth every 6 hours if needed for muscle spasms for up to 20 doses. 10 tablet 1    VITAMIN B COMPLEX ORAL Take by mouth.       No current facility-administered medications on file prior to visit.       Assessment/Plan   Problem List Items Addressed This Visit       Type 2 diabetes mellitus, with long-term current use of insulin - Primary    Patient currently uncontrolled with A1c of 8.4% (goal of <8%). Since last visit the patient continued Mounjaro and reported some nausea and indigestion but it is tolerable at this point.  Stressed the importance of taking the Humalog with meals to prevent hypoglycemia.  Patient would also like to remain on current dose of Mounjaro.  Will continue current regimen as numbers have significantly improved and will work on consistency " with diet especially protein intake.      PLAN:  - Continue Mounjaro 2.5 mg once weekly injection.    -  Continue Humalog  16-18 units WITH MEALS due to hypoglycemia   - Continue Lantus to 40 units once daily due to burning sensation at previous dose.   - Continue to test blood sugar levels and watch for signs/symptoms of hypoglycemia.  Instructed patient to call if having hypoglycemic events.  Patient verbalized understanding.      Follow up: 3 months per patient request          Relevant Orders    Referral to Clinical Pharmacy     Alanis Stock, PharmD    Continue all meds under the continuation of care with the referring provider and clinical pharmacy team.

## 2025-07-17 NOTE — ASSESSMENT & PLAN NOTE
Patient currently uncontrolled with A1c of 8.4% (goal of <8%). Since last visit the patient continued Mounjaro and reported some nausea and indigestion but it is tolerable at this point.  Stressed the importance of taking the Humalog with meals to prevent hypoglycemia.  Patient would also like to remain on current dose of Mounjaro.  Will continue current regimen as numbers have significantly improved and will work on consistency with diet especially protein intake.      PLAN:  - Continue Mounjaro 2.5 mg once weekly injection.    -  Continue Humalog  16-18 units WITH MEALS due to hypoglycemia   - Continue Lantus to 40 units once daily due to burning sensation at previous dose.   - Continue to test blood sugar levels and watch for signs/symptoms of hypoglycemia.  Instructed patient to call if having hypoglycemic events.  Patient verbalized understanding.      Follow up: 3 months per patient request

## 2025-09-26 ENCOUNTER — APPOINTMENT (OUTPATIENT)
Dept: PRIMARY CARE | Facility: CLINIC | Age: 75
End: 2025-09-26
Payer: MEDICARE

## 2025-10-02 ENCOUNTER — APPOINTMENT (OUTPATIENT)
Dept: PHARMACY | Facility: HOSPITAL | Age: 75
End: 2025-10-02
Payer: MEDICARE

## 2025-12-29 ENCOUNTER — APPOINTMENT (OUTPATIENT)
Dept: PRIMARY CARE | Facility: CLINIC | Age: 75
End: 2025-12-29
Payer: MEDICARE

## 2026-04-02 ENCOUNTER — APPOINTMENT (OUTPATIENT)
Dept: PRIMARY CARE | Facility: CLINIC | Age: 76
End: 2026-04-02
Payer: MEDICARE

## 2026-05-26 ENCOUNTER — APPOINTMENT (OUTPATIENT)
Dept: AUDIOLOGY | Facility: CLINIC | Age: 76
End: 2026-05-26
Payer: MEDICARE

## 2026-05-26 ENCOUNTER — APPOINTMENT (OUTPATIENT)
Dept: OTOLARYNGOLOGY | Facility: CLINIC | Age: 76
End: 2026-05-26
Payer: MEDICARE